# Patient Record
Sex: MALE | Race: WHITE | NOT HISPANIC OR LATINO | Employment: OTHER | URBAN - METROPOLITAN AREA
[De-identification: names, ages, dates, MRNs, and addresses within clinical notes are randomized per-mention and may not be internally consistent; named-entity substitution may affect disease eponyms.]

---

## 2017-01-13 ENCOUNTER — TELEPHONE (OUTPATIENT)
Dept: CARDIOLOGY | Facility: CLINIC | Age: 82
End: 2017-01-13

## 2017-01-13 DIAGNOSIS — I25.5 ISCHEMIC CARDIOMYOPATHY: ICD-10-CM

## 2017-01-13 DIAGNOSIS — Z79.01 CHRONIC ANTICOAGULATION: Primary | ICD-10-CM

## 2017-01-13 DIAGNOSIS — I25.10 CORONARY ARTERY DISEASE, ANGINA PRESENCE UNSPECIFIED, UNSPECIFIED VESSEL OR LESION TYPE, UNSPECIFIED WHETHER NATIVE OR TRANSPLANTED HEART: ICD-10-CM

## 2017-01-13 DIAGNOSIS — Z01.818 PRE-OP TESTING: ICD-10-CM

## 2017-01-13 NOTE — TELEPHONE ENCOUNTER
Pt scheduled for ICD 1/23/17@10am, labs scheduled 1/18/17, pre-op instructions given, [facility] pre-op nurse will also call pt, pt verbalizes understanding.

## 2017-01-16 ENCOUNTER — CLINICAL SUPPORT (OUTPATIENT)
Dept: FAMILY MEDICINE | Facility: CLINIC | Age: 82
End: 2017-01-16
Payer: MEDICARE

## 2017-01-16 ENCOUNTER — LAB VISIT (OUTPATIENT)
Dept: LAB | Facility: HOSPITAL | Age: 82
End: 2017-01-16
Attending: INTERNAL MEDICINE
Payer: MEDICARE

## 2017-01-16 DIAGNOSIS — Z79.01 CHRONIC ANTICOAGULATION: ICD-10-CM

## 2017-01-16 DIAGNOSIS — Z01.818 PRE-OP TESTING: ICD-10-CM

## 2017-01-16 LAB
ANION GAP SERPL CALC-SCNC: 6 MMOL/L
APTT BLDCRRT: 25.5 SEC
BASOPHILS # BLD AUTO: 0.02 K/UL
BASOPHILS NFR BLD: 0.3 %
BUN SERPL-MCNC: 18 MG/DL
CALCIUM SERPL-MCNC: 9.2 MG/DL
CHLORIDE SERPL-SCNC: 106 MMOL/L
CO2 SERPL-SCNC: 26 MMOL/L
CREAT SERPL-MCNC: 1.3 MG/DL
DIFFERENTIAL METHOD: ABNORMAL
EOSINOPHIL # BLD AUTO: 0.1 K/UL
EOSINOPHIL NFR BLD: 1.7 %
ERYTHROCYTE [DISTWIDTH] IN BLOOD BY AUTOMATED COUNT: 12.7 %
EST. GFR  (AFRICAN AMERICAN): 57.1 ML/MIN/1.73 M^2
EST. GFR  (NON AFRICAN AMERICAN): 49.4 ML/MIN/1.73 M^2
GLUCOSE SERPL-MCNC: 102 MG/DL
HCT VFR BLD AUTO: 34.6 %
HGB BLD-MCNC: 11.3 G/DL
INR PPP: 1
LYMPHOCYTES # BLD AUTO: 2.1 K/UL
LYMPHOCYTES NFR BLD: 26.8 %
MCH RBC QN AUTO: 29 PG
MCHC RBC AUTO-ENTMCNC: 32.7 %
MCV RBC AUTO: 89 FL
MONOCYTES # BLD AUTO: 0.7 K/UL
MONOCYTES NFR BLD: 8.8 %
NEUTROPHILS # BLD AUTO: 4.8 K/UL
NEUTROPHILS NFR BLD: 62.1 %
PLATELET # BLD AUTO: 244 K/UL
PMV BLD AUTO: 10.2 FL
POTASSIUM SERPL-SCNC: 4.6 MMOL/L
PROTHROMBIN TIME: 10.7 SEC
RBC # BLD AUTO: 3.89 M/UL
SODIUM SERPL-SCNC: 138 MMOL/L
WBC # BLD AUTO: 7.71 K/UL

## 2017-01-16 PROCEDURE — 36415 COLL VENOUS BLD VENIPUNCTURE: CPT | Mod: PO

## 2017-01-16 PROCEDURE — 96372 THER/PROPH/DIAG INJ SC/IM: CPT | Mod: S$GLB,,, | Performed by: FAMILY MEDICINE

## 2017-01-16 PROCEDURE — 85025 COMPLETE CBC W/AUTO DIFF WBC: CPT

## 2017-01-16 PROCEDURE — 85610 PROTHROMBIN TIME: CPT | Mod: PO

## 2017-01-16 PROCEDURE — 80048 BASIC METABOLIC PNL TOTAL CA: CPT

## 2017-01-16 PROCEDURE — 85730 THROMBOPLASTIN TIME PARTIAL: CPT | Mod: PO

## 2017-01-16 RX ADMIN — CYANOCOBALAMIN 1000 MCG: 1000 INJECTION, SOLUTION INTRAMUSCULAR; SUBCUTANEOUS at 10:01

## 2017-01-16 NOTE — MR AVS SNAPSHOT
[facility]  [address]  [place] [address]  Phone: [phone]  Fax: [phone]                  Dom Thomas   2017 10:00 AM   Appointment    Description:  Male : 1930   Provider:  [facility], NURSE   Department:  [facility]                To Do List           Future Appointments        Provider Department Dept Phone    2017 10:30 AM LAB, [place] [facility] [phone]    2017 9:45 AM [facility], NURSE [facility] [phone]    2017 8:00 AM LAB, [place] [facility] [phone]    2017 8:20 AM URINE [facility] [phone]    2017 2:00 PM Carlos Eduardo Tobar MD [facility] [phone]      Your Future Surgeries/Procedures     2017   Surgery with Ezequiel Rubio MD   [facility]    [address] [address]   [phone]              Goals (5 Years of Data)     None      [facility]     [facility] Nurse Care Line - 24/7 Assistance  Registered nurses in the [facility] provide clinical advisement, health education, appointment booking, and other advisory services.  Call for this free service at [phone].             Medications           Message regarding Medications     Verify the changes and/or additions to your medication regime listed below are the same as discussed with your clinician today.  If any of these changes or additions are incorrect, please notify your healthcare provider.             Verify that the below list of medications is an accurate representation of the medications you are currently taking.  If none reported, the list may be blank. If incorrect, please contact your healthcare provider. Carry this list with you in case of emergency.           Current Medications     atorvastatin (LIPITOR) 20 MG tablet Take 1 tablet (20 mg  total) by mouth once daily.    benazepril (LOTENSIN) 5 MG tablet Take 1 tablet (5 mg total) by mouth once daily.    clopidogrel (PLAVIX) 75 mg tablet Take 1 tablet (75 mg total) by mouth once daily.    lidocaine-prilocaine (EMLA) cream     meloxicam (MOBIC) 15 MG tablet Take 1 tablet (15 mg total) by mouth once daily.    metformin (GLUCOPHAGE-XR) 750 MG 24 hr tablet Take 1 tablet (750 mg total) by mouth 2 (two) times daily with meals.    temazepam (RESTORIL) 15 mg Cap TAKE 1 CAPSULE (15 MG) BY MOUTH AS NEEDED.           Clinical Reference Information           Allergies as of 1/16/2017     Carvedilol    Codeine      Immunizations Administered on Date of Encounter - 1/16/2017     None

## 2017-01-20 RX ORDER — TEMAZEPAM 15 MG/1
CAPSULE ORAL
Qty: 90 CAPSULE | Refills: 1 | Status: SHIPPED | OUTPATIENT
Start: 2017-01-20 | End: 2017-04-12 | Stop reason: SDUPTHER

## 2017-01-25 ENCOUNTER — TELEPHONE (OUTPATIENT)
Dept: CARDIOLOGY | Facility: CLINIC | Age: 82
End: 2017-01-25

## 2017-01-25 DIAGNOSIS — Z95.810 CARDIAC DEFIBRILLATOR IN SITU: Primary | ICD-10-CM

## 2017-01-25 DIAGNOSIS — I44.7 LBBB (LEFT BUNDLE BRANCH BLOCK): ICD-10-CM

## 2017-01-25 DIAGNOSIS — I25.5 ISCHEMIC CARDIOMYOPATHY: ICD-10-CM

## 2017-01-29 RX ORDER — CEPHALEXIN 500 MG/1
CAPSULE ORAL
Qty: 20 CAPSULE | Refills: 0 | Status: SHIPPED | OUTPATIENT
Start: 2017-01-29 | End: 2017-03-15

## 2017-01-31 ENCOUNTER — CLINICAL SUPPORT (OUTPATIENT)
Dept: CARDIOLOGY | Facility: CLINIC | Age: 82
End: 2017-01-31
Payer: MEDICARE

## 2017-01-31 DIAGNOSIS — I44.7 LBBB (LEFT BUNDLE BRANCH BLOCK): ICD-10-CM

## 2017-01-31 DIAGNOSIS — Z95.810 CARDIAC DEFIBRILLATOR IN SITU: ICD-10-CM

## 2017-01-31 DIAGNOSIS — I25.5 ISCHEMIC CARDIOMYOPATHY: ICD-10-CM

## 2017-01-31 PROCEDURE — 93284 PRGRMG EVAL IMPLANTABLE DFB: CPT | Mod: S$GLB,,, | Performed by: INTERNAL MEDICINE

## 2017-02-13 ENCOUNTER — CLINICAL SUPPORT (OUTPATIENT)
Dept: FAMILY MEDICINE | Facility: CLINIC | Age: 82
End: 2017-02-13
Payer: MEDICARE

## 2017-02-13 DIAGNOSIS — D51.9 ANEMIA DUE TO VITAMIN B12 DEFICIENCY, UNSPECIFIED B12 DEFICIENCY TYPE: Primary | ICD-10-CM

## 2017-02-13 PROCEDURE — 96372 THER/PROPH/DIAG INJ SC/IM: CPT | Mod: S$GLB,,, | Performed by: FAMILY MEDICINE

## 2017-02-13 RX ADMIN — CYANOCOBALAMIN 1000 MCG: 1000 INJECTION, SOLUTION INTRAMUSCULAR; SUBCUTANEOUS at 09:02

## 2017-03-01 ENCOUNTER — TELEPHONE (OUTPATIENT)
Dept: OPHTHALMOLOGY | Facility: CLINIC | Age: 82
End: 2017-03-01

## 2017-03-01 NOTE — TELEPHONE ENCOUNTER
----- Message from Asher Taylor sent at 3/1/2017  8:07 AM CST -----  Contact: Wife - Beulah Guzman  States that she had received an e-mail for a much earlier appointment than in April.  She is calling to see if you can schedule that earlier appointment. Please call [phone].  Thank you

## 2017-03-07 ENCOUNTER — OFFICE VISIT (OUTPATIENT)
Dept: OPHTHALMOLOGY | Facility: CLINIC | Age: 82
End: 2017-03-07
Payer: MEDICARE

## 2017-03-07 DIAGNOSIS — H02.102 ECTROPION OF BOTH LOWER EYELIDS, UNSPECIFIED ECTROPION TYPE: ICD-10-CM

## 2017-03-07 DIAGNOSIS — H02.105 ECTROPION OF BOTH LOWER EYELIDS, UNSPECIFIED ECTROPION TYPE: ICD-10-CM

## 2017-03-07 DIAGNOSIS — E11.9 DIABETES MELLITUS TYPE 2 WITHOUT RETINOPATHY: Primary | ICD-10-CM

## 2017-03-07 DIAGNOSIS — Z94.7 HISTORY OF CORNEAL TRANSPLANT: ICD-10-CM

## 2017-03-07 DIAGNOSIS — H35.30 MACULAR DEGENERATION: ICD-10-CM

## 2017-03-07 PROCEDURE — 99499 UNLISTED E&M SERVICE: CPT | Mod: S$GLB,,, | Performed by: OPHTHALMOLOGY

## 2017-03-07 PROCEDURE — 92014 COMPRE OPH EXAM EST PT 1/>: CPT | Mod: S$GLB,,, | Performed by: OPHTHALMOLOGY

## 2017-03-07 PROCEDURE — 99999 PR PBB SHADOW E&M-EST. PATIENT-LVL II: CPT | Mod: PBBFAC,,, | Performed by: OPHTHALMOLOGY

## 2017-03-07 NOTE — MR AVS SNAPSHOT
[place] [facility]  [address]  [place] [address]  Phone: [phone]  Fax: [phone]                  Dom Guzman   3/7/2017 1:00 PM   Office Visit    Description:  Male : 1930   Provider:  Maribell Rothman MD   Department:  [place] [facility]           Reason for Visit     Concerns About Ocular Health           Diagnoses this Visit        Comments    Diabetes mellitus type 2 without retinopathy    -  Primary     Macular degeneration         History of corneal transplant         Ectropion of both lower eyelids, unspecified ectropion type                To Do List           Future Appointments        Provider Department Dept Phone    3/15/2017 9:00 AM [facility], NURSE [facility] [phone]    3/15/2017 9:45 AM Ezequiel Rubio MD [facility] [phone]    3/15/2017 10:00 AM PACEMAKER [facility] [phone]    2017 8:00 AM LAB, [place] [facility] [phone]    2017 8:20 AM URINE [facility] [phone]      Goals (5 Years of Data)     None      Follow-Up and Disposition     Return in about 1 year (around 3/7/2018) for diabetic eye exam - optometry ok.      [facility]     [facility] Care [facility] - 7 Assistance  Registered nurses in the [facility] provide clinical advisement, health education, appointment booking, and other advisory services.  Call for this free service at [phone].             Medications           Message regarding Medications     Verify the changes and/or additions to your medication regime listed below are the same as discussed with your clinician today.  If any of these changes or additions are incorrect, please notify your healthcare provider.             Verify that the below list of medications is an accurate representation of the medications you are currently taking.  If none reported, the list may be  "blank. If incorrect, please contact your healthcare provider. Carry this list with you in case of emergency.           Current Medications     atorvastatin (LIPITOR) 20 MG tablet Take 1 tablet (20 mg total) by mouth once daily.    benazepril (LOTENSIN) 5 MG tablet Take 1 tablet (5 mg total) by mouth once daily.    cephALEXin (KEFLEX) 500 MG capsule TAKE 1 CAPSULE EVERY 8 HOURS    clopidogrel (PLAVIX) 75 mg tablet Take 1 tablet (75 mg total) by mouth once daily.    lidocaine-prilocaine (EMLA) cream     meloxicam (MOBIC) 15 MG tablet Take 1 tablet (15 mg total) by mouth once daily.    metformin (GLUCOPHAGE-XR) 750 MG 24 hr tablet Take 1 tablet (750 mg total) by mouth 2 (two) times daily with meals.    temazepam (RESTORIL) 15 mg Cap TAKE 1 CAPSULE BY MOUTH DAILY AS NEEDED.           Clinical Reference Information           Allergies as of 3/7/2017     Carvedilol    Codeine      Immunizations Administered on Date of Encounter - 3/7/2017     None      Instructions      I recommend that you use artificial tears 2-3 times daily. Recommended brands include Systane, Refresh, Genteal, and Thera-tears. Generic drops are okay too. Avoid any drops that say they "get the red out" - these should not be used on a regular basis.         Language Assistance Services     ATTENTION: Language assistance services are available, free of charge. Please call [phone].      ATENCIÓN: Si ricardo hernandes, tiene a wilburn disposición servicios gratuitos de asistencia lingüística. Llame al [phone].     [facility] Ý: N?u b?n nói Ti?ng Vi?t, có các d?ch v? h? tr? ngôn ng? mi?n phí dành cho b?n. G?i s? [phone].         [facility] complies with applicable Federal civil rights laws and does not discriminate on the basis of race, color, national origin, age, disability, or sex.        "

## 2017-03-07 NOTE — PROGRESS NOTES
HPI     Here for annual exam, states the LLL gets red and runs molina ongoing   comes and goes, seems ok today, did use Visine when the eye was bothering   states it helped. Does not use any gtts regularly.     S/p PKP Left Eye  CE Dr Gomez    Agree with above. Denies pain. Occasional floaters - long-standing.     Hemoglobin A1C       Date                     Value               Ref Range             Status                11/11/2016               7.0 (H)             4.5 - 6.2 %           Final              Comment:    According to ADA guidelines, hemoglobin A1C <7.0% represents  optimal   control in non-pregnant diabetic patients.  Different  metrics may apply   to specific populations.   Standards of Medical Care in Diabetes -   2016.  For the purpose of screening for the presence of diabetes:  <5.7%       Consistent with the absence of diabetes  5.7-6.4%  Consistent with   increasing risk for diabetes   (prediabetes)  >or=6.5%  Consistent with   diabetes  Currently no consensus exists for use of hemoglobin A1C  for   diagnosis of diabetes for children.         07/28/2016               7.1 (H)             0.0 - 5.6 %           Final              Comment:    Reference Interval:  5.0 - 5.6 Normal   5.7 - 6.4 High Risk   > 6.5   Diabetic    Hgb A1c results are standardized based on the (NGSP) National     Glycohemoglobin Standardization Program.    Hemoglobin A1C levels are   related to mean serum/plasma glucose   during the preceding 2-3 months.              05/11/2016               7.4 (H)             4.5 - 6.2 %           Final            ----------         Last edited by Maribell Rothman MD on 3/7/2017  1:54 PM.     ROS     Positive for: Neurological (neuropathy), Endocrine (DM diagnosed 2012),   Cardiovascular (denies HTN; CAD with stent and defibrillator), Eyes (CE   OU; PKP OS, trauma OS)    Negative for: Genitourinary (denies nephropathy), Respiratory (denies   asthma)    Last edited by Maribell Manuel  "Torres Rothman MD on 3/7/2017  1:54 PM.   (History)        Assessment /Plan     For exam results, see Encounter Report.    Diabetes mellitus type 2 without retinopathy    Macular degeneration    History of corneal transplant    Ectropion of both lower eyelids, unspecified ectropion type        Ectropion lower lids OU - I recommend that you use artificial tears 2-3 times daily. Recommended brands include Systane, Refresh, Genteal, and Thera-tears. Generic drops are okay too. Avoid any drops that say they "get the red out" - these should not be used on a regular basis. Also mateus qhs. Few trichiasis lashes RUL, minimal discomfort - observe for now. Warm compresses, lid hygiene. Handout given.    Diabetes mellitus type 2 without retinopathy - monitor annually, along with ARMD    Macular degeneration - more mottling/atrophy than prior photos from 2008. Last OCT ok. 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

## 2017-03-15 ENCOUNTER — CLINICAL SUPPORT (OUTPATIENT)
Dept: FAMILY MEDICINE | Facility: CLINIC | Age: 82
End: 2017-03-15
Payer: MEDICARE

## 2017-03-15 ENCOUNTER — OFFICE VISIT (OUTPATIENT)
Dept: CARDIOLOGY | Facility: CLINIC | Age: 82
End: 2017-03-15
Payer: MEDICARE

## 2017-03-15 ENCOUNTER — CLINICAL SUPPORT (OUTPATIENT)
Dept: CARDIOLOGY | Facility: CLINIC | Age: 82
End: 2017-03-15
Payer: MEDICARE

## 2017-03-15 VITALS
HEIGHT: 70 IN | BODY MASS INDEX: 23.62 KG/M2 | SYSTOLIC BLOOD PRESSURE: 129 MMHG | DIASTOLIC BLOOD PRESSURE: 65 MMHG | WEIGHT: 165 LBS | HEART RATE: 69 BPM

## 2017-03-15 DIAGNOSIS — I25.5 ISCHEMIC CARDIOMYOPATHY: ICD-10-CM

## 2017-03-15 DIAGNOSIS — Z95.810 CARDIAC DEFIBRILLATOR IN SITU: ICD-10-CM

## 2017-03-15 DIAGNOSIS — I25.10 CORONARY ARTERY DISEASE INVOLVING NATIVE CORONARY ARTERY WITHOUT ANGINA PECTORIS, UNSPECIFIED WHETHER NATIVE OR TRANSPLANTED HEART: Primary | ICD-10-CM

## 2017-03-15 DIAGNOSIS — I44.7 LBBB (LEFT BUNDLE BRANCH BLOCK): ICD-10-CM

## 2017-03-15 DIAGNOSIS — E78.5 HYPERLIPIDEMIA, UNSPECIFIED HYPERLIPIDEMIA TYPE: ICD-10-CM

## 2017-03-15 PROCEDURE — 1157F ADVNC CARE PLAN IN RCRD: CPT | Mod: S$GLB,,, | Performed by: INTERNAL MEDICINE

## 2017-03-15 PROCEDURE — 99999 PR PBB SHADOW E&M-EST. PATIENT-LVL II: CPT | Mod: PBBFAC,,, | Performed by: INTERNAL MEDICINE

## 2017-03-15 PROCEDURE — 1126F AMNT PAIN NOTED NONE PRSNT: CPT | Mod: S$GLB,,, | Performed by: INTERNAL MEDICINE

## 2017-03-15 PROCEDURE — 99499 UNLISTED E&M SERVICE: CPT | Mod: S$GLB,,, | Performed by: INTERNAL MEDICINE

## 2017-03-15 PROCEDURE — 1159F MED LIST DOCD IN RCRD: CPT | Mod: S$GLB,,, | Performed by: INTERNAL MEDICINE

## 2017-03-15 PROCEDURE — 99024 POSTOP FOLLOW-UP VISIT: CPT | Mod: S$GLB,,, | Performed by: INTERNAL MEDICINE

## 2017-03-15 PROCEDURE — 93284 PRGRMG EVAL IMPLANTABLE DFB: CPT | Mod: S$GLB,,, | Performed by: INTERNAL MEDICINE

## 2017-03-15 PROCEDURE — 1160F RVW MEDS BY RX/DR IN RCRD: CPT | Mod: S$GLB,,, | Performed by: INTERNAL MEDICINE

## 2017-03-15 PROCEDURE — 96372 THER/PROPH/DIAG INJ SC/IM: CPT | Mod: S$GLB,,, | Performed by: FAMILY MEDICINE

## 2017-03-15 RX ADMIN — CYANOCOBALAMIN 1000 MCG: 1000 INJECTION, SOLUTION INTRAMUSCULAR; SUBCUTANEOUS at 09:03

## 2017-03-15 NOTE — PROGRESS NOTES
Subjective:    Patient ID:  Dom Guzman is a 86 y.o. male who presents for follow-up of Coronary Artery Disease      HPI  He comes for follow up with no major problems, no chest pain, no shortness of breath.  FC II    Review of Systems   Constitution: Negative for decreased appetite, weakness, malaise/fatigue, weight gain and weight loss.   Cardiovascular: Negative for chest pain, dyspnea on exertion, leg swelling, palpitations and syncope.   Respiratory: Negative for cough and shortness of breath.    Gastrointestinal: Negative.    All other systems reviewed and are negative.       Objective:    Physical Exam   Constitutional: He is oriented to person, place, and time. He appears well-developed and well-nourished.   HENT:   Head: Normocephalic.   Eyes: Pupils are equal, round, and reactive to light.   Neck: Normal range of motion. Neck supple. No JVD present. Carotid bruit is not present. No thyromegaly present.   Cardiovascular: Normal rate, regular rhythm, normal heart sounds, intact distal pulses and normal pulses.  PMI is not displaced.  Exam reveals no gallop.    No murmur heard.  Pulmonary/Chest: Effort normal and breath sounds normal.   Abdominal: Soft. Normal appearance. He exhibits no mass. There is no hepatosplenomegaly. There is no tenderness.   Musculoskeletal: Normal range of motion. He exhibits no edema.   Neurological: He is alert and oriented to person, place, and time. He has normal strength and normal reflexes. No sensory deficit.   Skin: Skin is warm and intact.   Psychiatric: He has a normal mood and affect.   Nursing note and vitals reviewed.        Assessment:       1. Coronary artery disease involving native coronary artery without angina pectoris, unspecified whether native or transplanted heart    2. Ischemic cardiomyopathy    3. Hyperlipidemia, unspecified hyperlipidemia type         Plan:     Continue all cardiac medications  Regular exercise program  6 m f/u with [facility], labs

## 2017-03-16 ENCOUNTER — TELEPHONE (OUTPATIENT)
Dept: CARDIOLOGY | Facility: CLINIC | Age: 82
End: 2017-03-16

## 2017-03-16 NOTE — TELEPHONE ENCOUNTER
Patient is having double knee replacement with Dr. Wily Mueller at [facility]  and needs cardiac clearance. Please advise. Fax to [phone].

## 2017-03-29 RX ORDER — DEXTROSE 4 G
TABLET,CHEWABLE ORAL
Qty: 1 EACH | Refills: 0 | Status: ON HOLD | OUTPATIENT
Start: 2017-03-29 | End: 2021-01-01 | Stop reason: HOSPADM

## 2017-03-29 RX ORDER — ISOPROPYL ALCOHOL 70 ML/100ML
1 SWAB TOPICAL
Qty: 100 EACH | Refills: 3 | Status: ON HOLD | COMMUNITY
Start: 2017-03-29 | End: 2021-01-01 | Stop reason: HOSPADM

## 2017-03-29 RX ORDER — INSULIN PUMP SYRINGE, 3 ML
EACH MISCELLANEOUS
Qty: 1 EACH | Refills: 3 | Status: ON HOLD | OUTPATIENT
Start: 2017-03-29 | End: 2021-01-01 | Stop reason: HOSPADM

## 2017-04-12 ENCOUNTER — CLINICAL SUPPORT (OUTPATIENT)
Dept: FAMILY MEDICINE | Facility: CLINIC | Age: 82
End: 2017-04-12
Payer: MEDICARE

## 2017-04-12 ENCOUNTER — TELEPHONE (OUTPATIENT)
Dept: FAMILY MEDICINE | Facility: CLINIC | Age: 82
End: 2017-04-12

## 2017-04-12 DIAGNOSIS — E53.8 B12 DEFICIENCY: Primary | ICD-10-CM

## 2017-04-12 PROCEDURE — 96372 THER/PROPH/DIAG INJ SC/IM: CPT | Mod: S$GLB,,, | Performed by: FAMILY MEDICINE

## 2017-04-12 RX ORDER — TEMAZEPAM 30 MG/1
30 CAPSULE ORAL NIGHTLY PRN
Qty: 90 CAPSULE | Refills: 1 | Status: SHIPPED | OUTPATIENT
Start: 2017-04-12 | End: 2017-10-09 | Stop reason: SDUPTHER

## 2017-04-12 RX ADMIN — CYANOCOBALAMIN 1000 MCG: 1000 INJECTION, SOLUTION INTRAMUSCULAR; SUBCUTANEOUS at 09:04

## 2017-04-12 NOTE — TELEPHONE ENCOUNTER
Patient is asking to increase dosage of temazepam. States he can only sleep about 3 hours per night and is up. Please advise.

## 2017-05-08 ENCOUNTER — PATIENT OUTREACH (OUTPATIENT)
Dept: ADMINISTRATIVE | Facility: HOSPITAL | Age: 82
End: 2017-05-08

## 2017-05-19 ENCOUNTER — LAB VISIT (OUTPATIENT)
Dept: LAB | Facility: HOSPITAL | Age: 82
End: 2017-05-19
Attending: FAMILY MEDICINE
Payer: MEDICARE

## 2017-05-19 DIAGNOSIS — E53.8 B12 DEFICIENCY: ICD-10-CM

## 2017-05-19 DIAGNOSIS — E11.9 TYPE 2 DIABETES MELLITUS WITHOUT COMPLICATION, UNSPECIFIED LONG TERM INSULIN USE STATUS: ICD-10-CM

## 2017-05-19 LAB
ALBUMIN SERPL BCP-MCNC: 3.4 G/DL
ALP SERPL-CCNC: 67 U/L
ALT SERPL W/O P-5'-P-CCNC: 10 U/L
ANION GAP SERPL CALC-SCNC: 9 MMOL/L
AST SERPL-CCNC: 12 U/L
BILIRUB SERPL-MCNC: 0.6 MG/DL
BUN SERPL-MCNC: 17 MG/DL
CALCIUM SERPL-MCNC: 9 MG/DL
CHLORIDE SERPL-SCNC: 109 MMOL/L
CHOLEST/HDLC SERPL: 3.6 {RATIO}
CO2 SERPL-SCNC: 25 MMOL/L
CREAT SERPL-MCNC: 1.4 MG/DL
EST. GFR  (AFRICAN AMERICAN): 52.2 ML/MIN/1.73 M^2
EST. GFR  (NON AFRICAN AMERICAN): 45.2 ML/MIN/1.73 M^2
GLUCOSE SERPL-MCNC: 140 MG/DL
HDL/CHOLESTEROL RATIO: 28.1 %
HDLC SERPL-MCNC: 121 MG/DL
HDLC SERPL-MCNC: 34 MG/DL
LDLC SERPL CALC-MCNC: 65 MG/DL
NONHDLC SERPL-MCNC: 87 MG/DL
POTASSIUM SERPL-SCNC: 4.8 MMOL/L
PROT SERPL-MCNC: 6.5 G/DL
SODIUM SERPL-SCNC: 143 MMOL/L
TRIGL SERPL-MCNC: 110 MG/DL
VIT B12 SERPL-MCNC: 353 PG/ML

## 2017-05-19 PROCEDURE — 36415 COLL VENOUS BLD VENIPUNCTURE: CPT | Mod: PO

## 2017-05-19 PROCEDURE — 82607 VITAMIN B-12: CPT

## 2017-05-19 PROCEDURE — 80053 COMPREHEN METABOLIC PANEL: CPT

## 2017-05-19 PROCEDURE — 83036 HEMOGLOBIN GLYCOSYLATED A1C: CPT

## 2017-05-19 PROCEDURE — 80061 LIPID PANEL: CPT

## 2017-05-20 LAB
ESTIMATED AVG GLUCOSE: 163 MG/DL
HBA1C MFR BLD HPLC: 7.3 %

## 2017-05-22 ENCOUNTER — OFFICE VISIT (OUTPATIENT)
Dept: FAMILY MEDICINE | Facility: CLINIC | Age: 82
End: 2017-05-22
Payer: MEDICARE

## 2017-05-22 VITALS
DIASTOLIC BLOOD PRESSURE: 56 MMHG | TEMPERATURE: 98 F | HEIGHT: 70 IN | WEIGHT: 165.81 LBS | SYSTOLIC BLOOD PRESSURE: 146 MMHG | BODY MASS INDEX: 23.74 KG/M2 | HEART RATE: 78 BPM

## 2017-05-22 DIAGNOSIS — D51.9 ANEMIA DUE TO VITAMIN B12 DEFICIENCY, UNSPECIFIED B12 DEFICIENCY TYPE: ICD-10-CM

## 2017-05-22 DIAGNOSIS — G47.00 INSOMNIA, UNSPECIFIED TYPE: ICD-10-CM

## 2017-05-22 DIAGNOSIS — E13.40 NEUROPATHY DUE TO SECONDARY DIABETES: ICD-10-CM

## 2017-05-22 DIAGNOSIS — E11.9 TYPE 2 DIABETES MELLITUS WITHOUT COMPLICATION, UNSPECIFIED LONG TERM INSULIN USE STATUS: Primary | ICD-10-CM

## 2017-05-22 DIAGNOSIS — Z00.00 PREVENTATIVE HEALTH CARE: ICD-10-CM

## 2017-05-22 DIAGNOSIS — I25.10 CORONARY ARTERY DISEASE INVOLVING NATIVE CORONARY ARTERY WITHOUT ANGINA PECTORIS, UNSPECIFIED WHETHER NATIVE OR TRANSPLANTED HEART: ICD-10-CM

## 2017-05-22 DIAGNOSIS — E78.5 HYPERLIPIDEMIA, UNSPECIFIED HYPERLIPIDEMIA TYPE: ICD-10-CM

## 2017-05-22 PROCEDURE — 1126F AMNT PAIN NOTED NONE PRSNT: CPT | Mod: S$GLB,,, | Performed by: FAMILY MEDICINE

## 2017-05-22 PROCEDURE — 1160F RVW MEDS BY RX/DR IN RCRD: CPT | Mod: S$GLB,,, | Performed by: FAMILY MEDICINE

## 2017-05-22 PROCEDURE — 1159F MED LIST DOCD IN RCRD: CPT | Mod: S$GLB,,, | Performed by: FAMILY MEDICINE

## 2017-05-22 PROCEDURE — 99499 UNLISTED E&M SERVICE: CPT | Mod: S$GLB,,, | Performed by: FAMILY MEDICINE

## 2017-05-22 PROCEDURE — 96372 THER/PROPH/DIAG INJ SC/IM: CPT | Mod: S$GLB,,, | Performed by: FAMILY MEDICINE

## 2017-05-22 PROCEDURE — 99214 OFFICE O/P EST MOD 30 MIN: CPT | Mod: 25,S$GLB,, | Performed by: FAMILY MEDICINE

## 2017-05-22 PROCEDURE — 99999 PR PBB SHADOW E&M-EST. PATIENT-LVL III: CPT | Mod: PBBFAC,,, | Performed by: FAMILY MEDICINE

## 2017-05-22 RX ORDER — CYANOCOBALAMIN 1000 UG/ML
1000 INJECTION, SOLUTION INTRAMUSCULAR; SUBCUTANEOUS
Status: SHIPPED | OUTPATIENT
Start: 2017-05-22 | End: 2018-05-17

## 2017-05-22 RX ADMIN — CYANOCOBALAMIN 1000 MCG: 1000 INJECTION, SOLUTION INTRAMUSCULAR; SUBCUTANEOUS at 02:05

## 2017-05-22 NOTE — PROGRESS NOTES
Chief Complaint   Patient presents with    Diabetes     6 month follow up, Due B 12 inj     HPI:   Diabetes - Takes metformin  BID; Bgs 140 in AM. No hypoglycemia. tolerating Lotensin 5mg  Neuropathy has been persistent.  He is using a topical analgesic 4 times a day which is helping.    HLD - tolerating Lipitor  Insomnia - well- controlled on Restoril 30mg nightly.  CAD s/p NSTEMI - underwent successful PCI of LAD; ICD; taking plavix daily    PAST MEDICAL HISTORY:   B12 deficiency   Osteoarthritis  macular degeneration   dyslipidemia   Diabetes with neuropathy  CAD    PAST SURGICAL HISTORY:   Right hip replacement   eye surgery   blood clot in his head, status post ceslo hole  skin surgeries.     SOCIAL HISTORY: No tobacco. No alcohol. Lives with family.     FAMILY HISTORY: No prostate or colon cancer.     REVIEW OF SYSTEMS:     GENERAL: No fever, chills, fatigability or weight changes.  SKIN/BREASTS: No rashes, sores, itching or changes in color or texture of skin. No changes in moles.   HEAD: No headaches or recent head trauma.   EYES: Visual acuity fine. Denies blurriness, tearing, itching, photophobia, diplopia, or visual changes.   EARS: Denies ear pain, discharge, tinnitus or vertigo. Denies hearing loss.   NOSE: No loss of smell, epistaxis, postnasal drip, discharge, obstruction, or sneezing.  MOUTH & THROAT: No hoarseness, change in voice, swallowing difficulty. No excessive gum bleeding.   HEMATOLOGICAL/NODES: Denies swollen glands. No bleeding or bruising.  CHEST: No ROBERTS, cyanosis, wheezing, cough or sputum production.  CARDIOVASCULAR: Denies chest pain, dyspnea, orthopnea, or palpitations.  GI/ABDOMEN: Appetite fine. No weight loss. Denies nausea, vomiting, diarrhea, constipation, abdominal pain, hematemesis or blood in stool.  URINARY: No dysuria,hematuria, nocturia, incontinence, flank pain, urgency, or urinary difficulty.  PERIPHERAL VASCULAR: No claudication, cold intolerance or cyanosis.   "Reports numbness to feet and pain, which has been chronic. No worsening, no major improvement since starting Neurontin.  NEUROLOGIC: No history of seizures, paralysis, alteration of gait or coordination.    PSYCH:  No crying spells. Denies anxiety symptoms.    PHYSICAL EXAM:     BP (!) 146/56 (BP Location: Left arm, Patient Position: Sitting, BP Method: Manual)   Pulse 78   Temp 97.8 °F (36.6 °C) (Oral)   Ht 5' 10" (1.778 m)   Wt 75.2 kg (165 lb 12.6 oz)   BMI 23.79 kg/m²     GENERAL: He is a well-developed, well-nourished male, who appears in no apparent distress.   HEENT: NC/AT. PERRLA. EOMI. Mucous membranes are moist.   NECK: Supple. No carotid bruits. No thyroid enlargement.   CHEST: Clear to auscultation bilaterally with no crackles, rhonchi or wheezes.   CARDIOVASCULAR: Reveals a regular rate and rhythm. Normal S1 and S2. No murmurs, rubs or gallops are noted.   ABDOMEN: Positive bowel sounds, soft, and nontender. No HSM. No masses.   EXTREMITIES: No edema noted and pulses 2+ bilaterally.   Foot exam: inspection normal, sensation intact; 2+ DP pulses bilaterally    Results for orders placed or performed in visit on 05/19/17   Microalbumin/creatinine urine ratio   Result Value Ref Range    Microalbum.,U,Random 19.0 ug/mL    Creatinine, Random Ur 121.0 23.0 - 375.0 mg/dL    Microalb Creat Ratio 15.7 0.0 - 30.0 ug/mg     Labs 5/19/2017 reviewed    A/P:  Type 2 diabetes mellitus without complication, unspecified long term insulin use status  -     Hemoglobin A1c; Future; Expected date: 11/18/2017  -     Lipid panel; Future; Expected date: 11/18/2017  -     Comprehensive metabolic panel; Future; Expected date: 11/18/2017    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    Preventative health care  -     Discontinue: pneumoc 13-chandrika conj-dip cr,PF, (PREVNAR 13, PF,) 0.5 mL Syrg; Inject 0.5 mLs into the muscle once.  Dispense: 1 Syringe; Refill: 0    Other orders  -     Cancel: Pneumococcal Conjugate Vaccine (13 Valent) (IM); Future; Expected date: 06/22/2017      Diabetes mellitus type ii - controlled  Continue metformin XR 750mg BID    Hld (hyperlipidemia) - controlled  lipitor 20mg    Insomnia  Continue Restoril 30mg daily    f/u 6 months with labs  B12 shots monthly    Continue healthy eating and exercise

## 2017-05-23 ENCOUNTER — OFFICE VISIT (OUTPATIENT)
Dept: DERMATOLOGY | Facility: CLINIC | Age: 82
End: 2017-05-23
Payer: MEDICARE

## 2017-05-23 VITALS — HEIGHT: 70 IN | WEIGHT: 165 LBS | BODY MASS INDEX: 23.62 KG/M2

## 2017-05-23 DIAGNOSIS — L57.0 ACTINIC KERATOSES: Primary | ICD-10-CM

## 2017-05-23 DIAGNOSIS — D69.2 SENILE PURPURA: ICD-10-CM

## 2017-05-23 DIAGNOSIS — L81.4 SOLAR LENTIGO: ICD-10-CM

## 2017-05-23 DIAGNOSIS — L82.1 SEBORRHEIC KERATOSES: ICD-10-CM

## 2017-05-23 DIAGNOSIS — Z85.828 HISTORY OF SKIN CANCER: ICD-10-CM

## 2017-05-23 PROCEDURE — 1159F MED LIST DOCD IN RCRD: CPT | Mod: S$GLB,,, | Performed by: DERMATOLOGY

## 2017-05-23 PROCEDURE — 99213 OFFICE O/P EST LOW 20 MIN: CPT | Mod: 25,S$GLB,, | Performed by: DERMATOLOGY

## 2017-05-23 PROCEDURE — 99999 PR PBB SHADOW E&M-EST. PATIENT-LVL III: CPT | Mod: PBBFAC,,, | Performed by: DERMATOLOGY

## 2017-05-23 PROCEDURE — 17004 DESTROY PREMAL LESIONS 15/>: CPT | Mod: S$GLB,,, | Performed by: DERMATOLOGY

## 2017-05-23 PROCEDURE — 1126F AMNT PAIN NOTED NONE PRSNT: CPT | Mod: S$GLB,,, | Performed by: DERMATOLOGY

## 2017-05-23 PROCEDURE — 99499 UNLISTED E&M SERVICE: CPT | Mod: S$GLB,,, | Performed by: DERMATOLOGY

## 2017-05-23 PROCEDURE — 1160F RVW MEDS BY RX/DR IN RCRD: CPT | Mod: S$GLB,,, | Performed by: DERMATOLOGY

## 2017-05-23 NOTE — PATIENT INSTRUCTIONS

## 2017-05-23 NOTE — PROGRESS NOTES
Subjective:       Patient ID:  Dom Guzman is a 86 y.o. male who presents for   Chief Complaint   Patient presents with    Spot     face    Skin Check     UBSE     Patient last seen 6/28/2016 with AK's treated with cryo  H/o NMSCs:  BCC  07/07/2014 right infra-auricular  SCC nose s/p Mohs  This is a high risk patient here to check for the development of new lesions.          Spot  - Initial  Affected locations: face  Duration: 2 years  Signs / symptoms: scaling and dryness  Severity: mild  Timing: constant  Aggravated by: nothing  Treatments tried: Merl Pedro sunscreen   Improvement on treatment: no relief        Review of Systems   Constitutional: Negative for fever, chills, weight loss, weight gain, fatigue, night sweats and malaise.   Skin: Positive for daily sunscreen use, activity-related sunscreen use and wears hat.   Hematologic/Lymphatic: Does not bruise/bleed easily.        Objective:    Physical Exam   Constitutional: He appears well-developed and well-nourished. No distress.   Neurological: He is alert and oriented to person, place, and time. He is not disoriented.   Psychiatric: He has a normal mood and affect.   Skin:   Areas Examined (abnormalities noted in diagram):   Scalp / Hair Palpated and Inspected  Head / Face Inspection Performed  Neck Inspection Performed  Chest / Axilla Inspection Performed  Abdomen Inspection Performed  Back Inspection Performed  RUE Inspected  LUE Inspection Performed                       Diagram Legend     Erythematous scaling macule/papule c/w actinic keratosis       Vascular papule c/w angioma      Pigmented verrucoid papule/plaque c/w seborrheic keratosis      Yellow umbilicated papule c/w sebaceous hyperplasia      Irregularly shaped tan macule c/w lentigo     1-2 mm smooth white papules consistent with Milia      Movable subcutaneous cyst with punctum c/w epidermal inclusion cyst      Subcutaneous movable cyst c/w pilar cyst      Firm pink to brown papule c/w  dermatofibroma      Pedunculated fleshy papule(s) c/w skin tag(s)      Evenly pigmented macule c/w junctional nevus     Mildly variegated pigmented, slightly irregular-bordered macule c/w mildly atypical nevus      Flesh colored to evenly pigmented papule c/w intradermal nevus       Pink pearly papule/plaque c/w basal cell carcinoma      Erythematous hyperkeratotic cursted plaque c/w SCC      Surgical scar with no sign of skin cancer recurrence      Open and closed comedones      Inflammatory papules and pustules      Verrucoid papule consistent consistent with wart     Erythematous eczematous patches and plaques     Dystrophic onycholytic nail with subungual debris c/w onychomycosis     Umbilicated papule    Erythematous-base heme-crusted tan verrucoid plaque consistent with inflamed seborrheic keratosis     Erythematous Silvery Scaling Plaque c/w Psoriasis     See annotation      Assessment / Plan:        Actinic keratoses  Cryosurgery Procedure Note    Verbal consent from the patient is obtained and the patient is aware of the precancerous quality and need for treatment of these lesions. Liquid nitrogen cryosurgery is applied to the 25 actinic keratoses, as detailed in the physical exam, to produce a freeze injury. The patient is aware that blisters may form and is instructed on wound care with gentle cleansing and use of vaseline ointment to keep moist until healed. The patient is supplied a handout on cryosurgery and is instructed to call if lesions do not completely resolve.    History of skin cancer  Area of previous NMSC (R infra auricular and nose) examined. Site well healed with no signs of recurrence.  Upper body skin examination performed today including at least 9 points as noted in physical examination. No lesions suspicious for malignancy noted.    Seborrheic keratoses  These are benign inherited growths without a malignant potential. Reassurance given to patient. No treatment is necessary.     Solar  lentigo  This is a benign hyperpigmented sun induced lesion. Daily sun protection will reduce the number of new lesions. Treatment of these benign lesions are considered cosmetic.    Senile purpura  Common finding in aging sun damaged skin; plavix contributes    Patient instructed in importance in daily sun protection of at least spf 30. Sun avoidance and topical protection discussed.   Recommend Elta MD (physician office) COTZ sensitive (available online) for daily use on face and neck.  Patient encouraged to wear hat for all outdoor exposure.   Also discussed sun protective clothing.           Return in about 6 months (around 11/23/2017).

## 2017-06-02 DIAGNOSIS — E13.40 NEUROPATHY DUE TO SECONDARY DIABETES: ICD-10-CM

## 2017-06-02 RX ORDER — CELECOXIB 200 MG/1
200 CAPSULE ORAL DAILY
Qty: 90 CAPSULE | Refills: 3 | Status: SHIPPED | OUTPATIENT
Start: 2017-06-02 | End: 2017-11-29

## 2017-06-19 ENCOUNTER — CLINICAL SUPPORT (OUTPATIENT)
Dept: FAMILY MEDICINE | Facility: CLINIC | Age: 82
End: 2017-06-19
Payer: MEDICARE

## 2017-06-19 ENCOUNTER — TELEPHONE (OUTPATIENT)
Dept: FAMILY MEDICINE | Facility: CLINIC | Age: 82
End: 2017-06-19

## 2017-06-19 ENCOUNTER — CLINICAL SUPPORT (OUTPATIENT)
Dept: CARDIOLOGY | Facility: CLINIC | Age: 82
End: 2017-06-19
Payer: MEDICARE

## 2017-06-19 DIAGNOSIS — I25.5 ISCHEMIC CARDIOMYOPATHY: ICD-10-CM

## 2017-06-19 DIAGNOSIS — I44.7 LBBB (LEFT BUNDLE BRANCH BLOCK): ICD-10-CM

## 2017-06-19 DIAGNOSIS — Z95.810 CARDIAC DEFIBRILLATOR IN SITU: ICD-10-CM

## 2017-06-19 PROCEDURE — G0009 ADMIN PNEUMOCOCCAL VACCINE: HCPCS | Mod: S$GLB,,, | Performed by: FAMILY MEDICINE

## 2017-06-19 PROCEDURE — 99999 PR PBB SHADOW E&M-EST. PATIENT-LVL I: CPT | Mod: PBBFAC,,,

## 2017-06-19 PROCEDURE — 90670 PCV13 VACCINE IM: CPT | Mod: S$GLB,,, | Performed by: FAMILY MEDICINE

## 2017-06-19 PROCEDURE — 93295 DEV INTERROG REMOTE 1/2/MLT: CPT | Mod: S$GLB,,, | Performed by: INTERNAL MEDICINE

## 2017-06-19 PROCEDURE — 93296 REM INTERROG EVL PM/IDS: CPT | Mod: S$GLB,,, | Performed by: INTERNAL MEDICINE

## 2017-06-19 PROCEDURE — 96372 THER/PROPH/DIAG INJ SC/IM: CPT | Mod: 59,S$GLB,, | Performed by: FAMILY MEDICINE

## 2017-06-19 RX ADMIN — CYANOCOBALAMIN 1000 MCG: 1000 INJECTION, SOLUTION INTRAMUSCULAR; SUBCUTANEOUS at 10:06

## 2017-07-19 ENCOUNTER — CLINICAL SUPPORT (OUTPATIENT)
Dept: FAMILY MEDICINE | Facility: CLINIC | Age: 82
End: 2017-07-19
Payer: MEDICARE

## 2017-07-19 DIAGNOSIS — D51.9 ANEMIA DUE TO VITAMIN B12 DEFICIENCY, UNSPECIFIED B12 DEFICIENCY TYPE: Primary | ICD-10-CM

## 2017-07-19 PROCEDURE — 96372 THER/PROPH/DIAG INJ SC/IM: CPT | Mod: S$GLB,,, | Performed by: FAMILY MEDICINE

## 2017-07-19 RX ADMIN — CYANOCOBALAMIN 1000 MCG: 1000 INJECTION, SOLUTION INTRAMUSCULAR; SUBCUTANEOUS at 09:07

## 2017-08-21 DIAGNOSIS — E11.9 TYPE 2 DIABETES MELLITUS WITHOUT COMPLICATION, WITHOUT LONG-TERM CURRENT USE OF INSULIN: ICD-10-CM

## 2017-08-21 RX ORDER — BENAZEPRIL HYDROCHLORIDE 5 MG/1
5 TABLET ORAL DAILY
Qty: 90 TABLET | Refills: 3 | Status: SHIPPED | OUTPATIENT
Start: 2017-08-21 | End: 2018-12-13 | Stop reason: SDUPTHER

## 2017-08-21 RX ORDER — CLOPIDOGREL BISULFATE 75 MG/1
75 TABLET ORAL DAILY
Qty: 90 TABLET | Refills: 3 | Status: SHIPPED | OUTPATIENT
Start: 2017-08-21 | End: 2018-10-23 | Stop reason: SDUPTHER

## 2017-08-21 RX ORDER — METFORMIN HYDROCHLORIDE 750 MG/1
750 TABLET, EXTENDED RELEASE ORAL 2 TIMES DAILY WITH MEALS
Qty: 180 TABLET | Refills: 3 | Status: SHIPPED | OUTPATIENT
Start: 2017-08-21 | End: 2018-10-23 | Stop reason: SDUPTHER

## 2017-08-21 RX ORDER — ATORVASTATIN CALCIUM 20 MG/1
20 TABLET, FILM COATED ORAL DAILY
Qty: 180 TABLET | Refills: 3 | Status: SHIPPED | OUTPATIENT
Start: 2017-08-21 | End: 2018-05-29

## 2017-08-21 NOTE — TELEPHONE ENCOUNTER
----- Message from Shelia Saeed sent at 8/21/2017  8:21 AM CDT -----  Contact: Wife Beulah [phone]  She is requesting new prescriptions of benazepril 5 mg, plavix 75 mg, metformin  mg and lipitor 20 mg be sent to:   [facility] - [place], [place] - [address]  [address] [address]  Phone: [phone] Fax: [phone]    Thank you!

## 2017-09-20 ENCOUNTER — CLINICAL SUPPORT (OUTPATIENT)
Dept: CARDIOLOGY | Facility: CLINIC | Age: 82
End: 2017-09-20
Payer: MEDICARE

## 2017-09-20 DIAGNOSIS — I25.5 ISCHEMIC CARDIOMYOPATHY: ICD-10-CM

## 2017-09-20 DIAGNOSIS — E78.5 HYPERLIPIDEMIA, UNSPECIFIED HYPERLIPIDEMIA TYPE: ICD-10-CM

## 2017-09-20 DIAGNOSIS — I25.10 CORONARY ARTERY DISEASE INVOLVING NATIVE CORONARY ARTERY WITHOUT ANGINA PECTORIS, UNSPECIFIED WHETHER NATIVE OR TRANSPLANTED HEART: ICD-10-CM

## 2017-09-20 PROCEDURE — 93306 TTE W/DOPPLER COMPLETE: CPT | Mod: S$GLB,,, | Performed by: INTERNAL MEDICINE

## 2017-09-21 ENCOUNTER — LAB VISIT (OUTPATIENT)
Dept: LAB | Facility: HOSPITAL | Age: 82
End: 2017-09-21
Attending: INTERNAL MEDICINE
Payer: MEDICARE

## 2017-09-21 DIAGNOSIS — I25.5 ISCHEMIC CARDIOMYOPATHY: ICD-10-CM

## 2017-09-21 DIAGNOSIS — I25.10 CORONARY ARTERY DISEASE INVOLVING NATIVE CORONARY ARTERY WITHOUT ANGINA PECTORIS, UNSPECIFIED WHETHER NATIVE OR TRANSPLANTED HEART: ICD-10-CM

## 2017-09-21 DIAGNOSIS — E78.5 HYPERLIPIDEMIA, UNSPECIFIED HYPERLIPIDEMIA TYPE: ICD-10-CM

## 2017-09-21 LAB
ALBUMIN SERPL BCP-MCNC: 3.2 G/DL
ALP SERPL-CCNC: 77 U/L
ALT SERPL W/O P-5'-P-CCNC: 8 U/L
ANION GAP SERPL CALC-SCNC: 8 MMOL/L
AST SERPL-CCNC: 12 U/L
BILIRUB DIRECT SERPL-MCNC: 0.3 MG/DL
BILIRUB SERPL-MCNC: 0.5 MG/DL
BNP SERPL-MCNC: 85 PG/ML
BUN SERPL-MCNC: 20 MG/DL
CALCIUM SERPL-MCNC: 8.8 MG/DL
CHLORIDE SERPL-SCNC: 109 MMOL/L
CO2 SERPL-SCNC: 24 MMOL/L
CREAT SERPL-MCNC: 1.5 MG/DL
DIASTOLIC DYSFUNCTION: YES
EST. GFR  (AFRICAN AMERICAN): 48 ML/MIN/1.73 M^2
EST. GFR  (NON AFRICAN AMERICAN): 41.6 ML/MIN/1.73 M^2
ESTIMATED PA SYSTOLIC PRESSURE: 30.46
GLUCOSE SERPL-MCNC: 140 MG/DL
MITRAL VALVE MOBILITY: NORMAL
MITRAL VALVE REGURGITATION: ABNORMAL
POTASSIUM SERPL-SCNC: 4.5 MMOL/L
PROT SERPL-MCNC: 6.5 G/DL
RETIRED EF AND QEF - SEE NOTES: 40 (ref 55–65)
SODIUM SERPL-SCNC: 141 MMOL/L
TRICUSPID VALVE REGURGITATION: ABNORMAL
TSH SERPL DL<=0.005 MIU/L-ACNC: 0.76 UIU/ML

## 2017-09-21 PROCEDURE — 80048 BASIC METABOLIC PNL TOTAL CA: CPT

## 2017-09-21 PROCEDURE — 84443 ASSAY THYROID STIM HORMONE: CPT

## 2017-09-21 PROCEDURE — 36415 COLL VENOUS BLD VENIPUNCTURE: CPT | Mod: PO

## 2017-09-21 PROCEDURE — 83880 ASSAY OF NATRIURETIC PEPTIDE: CPT

## 2017-09-21 PROCEDURE — 80076 HEPATIC FUNCTION PANEL: CPT

## 2017-09-26 ENCOUNTER — OFFICE VISIT (OUTPATIENT)
Dept: CARDIOLOGY | Facility: CLINIC | Age: 82
End: 2017-09-26
Payer: MEDICARE

## 2017-09-26 ENCOUNTER — CLINICAL SUPPORT (OUTPATIENT)
Dept: CARDIOLOGY | Facility: CLINIC | Age: 82
End: 2017-09-26
Payer: MEDICARE

## 2017-09-26 VITALS
DIASTOLIC BLOOD PRESSURE: 61 MMHG | WEIGHT: 168.63 LBS | HEIGHT: 70 IN | HEART RATE: 62 BPM | BODY MASS INDEX: 24.14 KG/M2 | SYSTOLIC BLOOD PRESSURE: 145 MMHG

## 2017-09-26 DIAGNOSIS — I44.7 LBBB (LEFT BUNDLE BRANCH BLOCK): ICD-10-CM

## 2017-09-26 DIAGNOSIS — I25.5 ISCHEMIC CARDIOMYOPATHY: Primary | ICD-10-CM

## 2017-09-26 DIAGNOSIS — Z95.810 CARDIAC DEFIBRILLATOR IN SITU: ICD-10-CM

## 2017-09-26 DIAGNOSIS — I25.5 ISCHEMIC CARDIOMYOPATHY: ICD-10-CM

## 2017-09-26 DIAGNOSIS — E78.5 HYPERLIPIDEMIA, UNSPECIFIED HYPERLIPIDEMIA TYPE: ICD-10-CM

## 2017-09-26 DIAGNOSIS — I25.10 CORONARY ARTERY DISEASE INVOLVING NATIVE CORONARY ARTERY WITHOUT ANGINA PECTORIS, UNSPECIFIED WHETHER NATIVE OR TRANSPLANTED HEART: ICD-10-CM

## 2017-09-26 PROCEDURE — 1126F AMNT PAIN NOTED NONE PRSNT: CPT | Mod: S$GLB,,, | Performed by: INTERNAL MEDICINE

## 2017-09-26 PROCEDURE — 99999 PR PBB SHADOW E&M-EST. PATIENT-LVL II: CPT | Mod: PBBFAC,,, | Performed by: INTERNAL MEDICINE

## 2017-09-26 PROCEDURE — 1159F MED LIST DOCD IN RCRD: CPT | Mod: S$GLB,,, | Performed by: INTERNAL MEDICINE

## 2017-09-26 PROCEDURE — 99499 UNLISTED E&M SERVICE: CPT | Mod: S$GLB,,, | Performed by: INTERNAL MEDICINE

## 2017-09-26 PROCEDURE — 3008F BODY MASS INDEX DOCD: CPT | Mod: S$GLB,,, | Performed by: INTERNAL MEDICINE

## 2017-09-26 PROCEDURE — 99214 OFFICE O/P EST MOD 30 MIN: CPT | Mod: S$GLB,,, | Performed by: INTERNAL MEDICINE

## 2017-09-26 PROCEDURE — 93283 PRGRMG EVAL IMPLANTABLE DFB: CPT | Mod: S$GLB,,, | Performed by: INTERNAL MEDICINE

## 2017-09-26 PROCEDURE — 99999 PR PBB SHADOW E&M-EST. PATIENT-LVL II: CPT | Mod: PBBFAC,,,

## 2017-09-26 NOTE — PROGRESS NOTES
Subjective:    Patient ID:  Dom Guzman is a 86 y.o. male who presents for follow-up of Coronary Artery Disease       HPI  He comes for follow up with no major problems, no chest pain, no shortness of breath.  FC II    Review of Systems   Constitution: Negative for decreased appetite, weakness, malaise/fatigue, weight gain and weight loss.   Cardiovascular: Negative for chest pain, dyspnea on exertion, leg swelling, palpitations and syncope.   Respiratory: Negative for cough and shortness of breath.    Gastrointestinal: Negative.    All other systems reviewed and are negative.       Objective:    Physical Exam   Constitutional: He is oriented to person, place, and time. He appears well-developed and well-nourished.   HENT:   Head: Normocephalic.   Eyes: Pupils are equal, round, and reactive to light.   Neck: Normal range of motion. Neck supple. No JVD present. Carotid bruit is not present. No thyromegaly present.   Cardiovascular: Normal rate, regular rhythm, normal heart sounds, intact distal pulses and normal pulses.  PMI is not displaced.  Exam reveals no gallop.    No murmur heard.  Pulmonary/Chest: Effort normal and breath sounds normal.   Abdominal: Soft. Normal appearance. He exhibits no mass. There is no hepatosplenomegaly. There is no tenderness.   Musculoskeletal: Normal range of motion. He exhibits no edema.   Neurological: He is alert and oriented to person, place, and time. He has normal strength and normal reflexes. No sensory deficit.   Skin: Skin is warm and intact.   Psychiatric: He has a normal mood and affect.   Nursing note and vitals reviewed.    Echo reviewed      Assessment:       1. Ischemic cardiomyopathy    2. Coronary artery disease involving native coronary artery without angina pectoris, unspecified whether native or transplanted heart    3. Hyperlipidemia, unspecified hyperlipidemia type         Plan:     Continue all cardiac medications  Regular exercise program  9 m f/u

## 2017-10-09 RX ORDER — TEMAZEPAM 30 MG/1
30 CAPSULE ORAL NIGHTLY PRN
Qty: 90 CAPSULE | Refills: 1 | Status: SHIPPED | OUTPATIENT
Start: 2017-10-09 | End: 2018-04-02 | Stop reason: SDUPTHER

## 2017-11-15 ENCOUNTER — PATIENT OUTREACH (OUTPATIENT)
Dept: ADMINISTRATIVE | Facility: HOSPITAL | Age: 82
End: 2017-11-15

## 2017-11-15 NOTE — PROGRESS NOTES
Health Maintenance Due   Topic Date Due    Influenza Vaccine  08/01/2017    Hemoglobin A1c  11/19/2017

## 2017-11-16 ENCOUNTER — LAB VISIT (OUTPATIENT)
Dept: LAB | Facility: HOSPITAL | Age: 82
End: 2017-11-16
Attending: FAMILY MEDICINE
Payer: MEDICARE

## 2017-11-16 DIAGNOSIS — E11.9 TYPE 2 DIABETES MELLITUS WITHOUT COMPLICATION, UNSPECIFIED LONG TERM INSULIN USE STATUS: ICD-10-CM

## 2017-11-16 LAB
ALBUMIN SERPL BCP-MCNC: 3.4 G/DL
ALP SERPL-CCNC: 84 U/L
ALT SERPL W/O P-5'-P-CCNC: 10 U/L
ANION GAP SERPL CALC-SCNC: 7 MMOL/L
AST SERPL-CCNC: 15 U/L
BILIRUB SERPL-MCNC: 0.9 MG/DL
BUN SERPL-MCNC: 18 MG/DL
CALCIUM SERPL-MCNC: 9.4 MG/DL
CHLORIDE SERPL-SCNC: 107 MMOL/L
CHOLEST SERPL-MCNC: 118 MG/DL
CHOLEST/HDLC SERPL: 3.6 {RATIO}
CO2 SERPL-SCNC: 26 MMOL/L
CREAT SERPL-MCNC: 1.7 MG/DL
EST. GFR  (AFRICAN AMERICAN): 41.3 ML/MIN/1.73 M^2
EST. GFR  (NON AFRICAN AMERICAN): 35.7 ML/MIN/1.73 M^2
ESTIMATED AVG GLUCOSE: 157 MG/DL
GLUCOSE SERPL-MCNC: 146 MG/DL
HBA1C MFR BLD HPLC: 7.1 %
HDLC SERPL-MCNC: 33 MG/DL
HDLC SERPL: 28 %
LDLC SERPL CALC-MCNC: 65 MG/DL
NONHDLC SERPL-MCNC: 85 MG/DL
POTASSIUM SERPL-SCNC: 5 MMOL/L
PROT SERPL-MCNC: 6.8 G/DL
SODIUM SERPL-SCNC: 140 MMOL/L
TRIGL SERPL-MCNC: 100 MG/DL

## 2017-11-16 PROCEDURE — 83036 HEMOGLOBIN GLYCOSYLATED A1C: CPT

## 2017-11-16 PROCEDURE — 36415 COLL VENOUS BLD VENIPUNCTURE: CPT | Mod: PO

## 2017-11-16 PROCEDURE — 80061 LIPID PANEL: CPT

## 2017-11-16 PROCEDURE — 80053 COMPREHEN METABOLIC PANEL: CPT

## 2017-11-27 ENCOUNTER — OFFICE VISIT (OUTPATIENT)
Dept: DERMATOLOGY | Facility: CLINIC | Age: 82
End: 2017-11-27
Payer: MEDICARE

## 2017-11-27 VITALS — WEIGHT: 168 LBS | HEIGHT: 70 IN | BODY MASS INDEX: 24.05 KG/M2

## 2017-11-27 DIAGNOSIS — L81.4 SOLAR LENTIGO: ICD-10-CM

## 2017-11-27 DIAGNOSIS — Z85.828 HISTORY OF SKIN CANCER: ICD-10-CM

## 2017-11-27 DIAGNOSIS — L57.0 ACTINIC KERATOSES: ICD-10-CM

## 2017-11-27 DIAGNOSIS — D48.9 NEOPLASM OF UNCERTAIN BEHAVIOR: Primary | ICD-10-CM

## 2017-11-27 DIAGNOSIS — L82.1 SEBORRHEIC KERATOSES: ICD-10-CM

## 2017-11-27 PROCEDURE — 99213 OFFICE O/P EST LOW 20 MIN: CPT | Mod: 25,S$GLB,, | Performed by: DERMATOLOGY

## 2017-11-27 PROCEDURE — 88305 TISSUE EXAM BY PATHOLOGIST: CPT | Performed by: PATHOLOGY

## 2017-11-27 PROCEDURE — 99999 PR PBB SHADOW E&M-EST. PATIENT-LVL III: CPT | Mod: PBBFAC,,, | Performed by: DERMATOLOGY

## 2017-11-27 PROCEDURE — 11101 PR BIOPSY, EACH ADDED LESION: CPT | Mod: S$GLB,,, | Performed by: DERMATOLOGY

## 2017-11-27 PROCEDURE — 11100 PR BIOPSY OF SKIN LESION: CPT | Mod: 59,S$GLB,, | Performed by: DERMATOLOGY

## 2017-11-27 PROCEDURE — 17004 DESTROY PREMAL LESIONS 15/>: CPT | Mod: S$GLB,,, | Performed by: DERMATOLOGY

## 2017-11-27 NOTE — PATIENT INSTRUCTIONS
Shave Biopsy Wound Care    Your doctor has performed a shave biopsy today.  A band aid and vaseline ointment has been placed over the site.  This should remain in place for 24 hours.  It is recommended that you keep the area dry for the first 24 hours.  After 24 hours, you may remove the band aid and wash the area with warm soap and water and apply Vaseline jelly.  Many patients prefer to use Neosporin or Bacitracin ointment.  This is acceptable; however, know that you can develop an allergy to this medication even if you have used it safely for years.  It is important to keep the area moist.  Letting it dry out and get air slows healing time, and will worsen the scar.  Band aid is optional after first 24 hours.      If you notice increasing redness, tenderness, pain, or yellow drainage at the biopsy site, please notify your doctor.  These are signs of an infection.    If your biopsy site is bleeding, apply firm pressure for 15 minutes straight.  Repeat for another 15 minutes, if it is still bleeding.   If the surgical site continues to bleed, then please contact your doctor.       [facility]  [facility]  [address]  [place] [address]  Dept: [phone]       CRYOSURGERY      Your doctor has used a method called cryosurgery to treat your skin condition. Cryosurgery refers to the use of very cold substances to treat a variety of skin conditions such as warts, pre-skin cancers, molluscum contagiosum, sun spots, and several benign growths. The substance we use in cryosurgery is liquid nitrogen and is so cold (-195 degrees Celsius) that is burns when administered.     Following treatment in the office, the skin may immediately burn and become red. You may find the area around the lesion is affected as well. It is sometimes necessary to treat not only the lesion, but a small area of the surrounding normal skin to achieve a good response.     A blister, and even a blood filled blister,  may form after treatment.   This is a normal response. If the blister is painful, it is acceptable to sterilize a needle and with rubbing alcohol and gently pop the blister. It is important that you gently wash the area with soap and warm water as the blister fluid may contain wart virus if a wart was treated. Do no remove the roof of the blister.     The area treated can take anywhere from 1-3 weeks to heal. Healing time depends on the kind skin lesion treated, the location, and how aggressively the lesion was treated. It is recommended that the areas treated are covered with Vaseline or bacitracin ointment and a band-aid. If a band-aid is not practical, just ointment applied several times per day will do. Keeping these areas moist will speed the healing time.    Treatment with liquid nitrogen can leave a scar. In dark skin, it may be a light or dark scar, in light skin it may be a white or pink scar. These will generally fade with time, but may never go away completely.     If you have any concerns after your treatment, please feel free to call the office.         [facility]  [facility]  [address] [place] [address]  Dept: [phone]

## 2017-11-27 NOTE — PROGRESS NOTES
Subjective:       Patient ID:  Dom Guzman is a 87 y.o. male who presents for   Chief Complaint   Patient presents with    Skin Check     6 month UBSE    Spot     face     Patient last seen 5/2017 with AK's treated with cryo    Ophthalmology Dr Velez following left eye ectropion    H/o NMSCs:  BCC  07/07/2014 right infra-auricular  SCC nose s/p Mohs  This is a high risk patient here to check for the development of new lesions.          Spot  - Initial  Affected locations: face  Duration: on/off for years.  Signs / symptoms: dryness  Severity: mild  Timing: constant  Aggravated by: nothing  Relieving factors/Treatments tried: cryosurgery  Improvement on treatment: mild        Review of Systems   Constitutional: Negative for fever, chills, weight loss, weight gain, fatigue, night sweats and malaise.   Skin: Positive for activity-related sunscreen use and wears hat. Negative for daily sunscreen use.   Hematologic/Lymphatic: Bruises/bleeds easily.        Objective:    Physical Exam   Constitutional: He appears well-developed and well-nourished. No distress.   Neurological: He is alert and oriented to person, place, and time. He is not disoriented.   Psychiatric: He has a normal mood and affect.   Skin:   Areas Examined (abnormalities noted in diagram):   Scalp / Hair Palpated and Inspected  Head / Face Inspection Performed  Neck Inspection Performed  Chest / Axilla Inspection Performed  Abdomen Inspection Performed  Back Inspection Performed  RUE Inspected  LUE Inspection Performed                   Diagram Legend     Erythematous scaling macule/papule c/w actinic keratosis       Vascular papule c/w angioma      Pigmented verrucoid papule/plaque c/w seborrheic keratosis      Yellow umbilicated papule c/w sebaceous hyperplasia      Irregularly shaped tan macule c/w lentigo     1-2 mm smooth white papules consistent with Milia      Movable subcutaneous cyst with punctum c/w epidermal inclusion cyst       Subcutaneous movable cyst c/w pilar cyst      Firm pink to brown papule c/w dermatofibroma      Pedunculated fleshy papule(s) c/w skin tag(s)      Evenly pigmented macule c/w junctional nevus     Mildly variegated pigmented, slightly irregular-bordered macule c/w mildly atypical nevus      Flesh colored to evenly pigmented papule c/w intradermal nevus       Pink pearly papule/plaque c/w basal cell carcinoma      Erythematous hyperkeratotic cursted plaque c/w SCC      Surgical scar with no sign of skin cancer recurrence      Open and closed comedones      Inflammatory papules and pustules      Verrucoid papule consistent consistent with wart     Erythematous eczematous patches and plaques     Dystrophic onycholytic nail with subungual debris c/w onychomycosis     Umbilicated papule    Erythematous-base heme-crusted tan verrucoid plaque consistent with inflamed seborrheic keratosis     Erythematous Silvery Scaling Plaque c/w Psoriasis     See annotation              Assessment / Plan:      Pathology Orders:      Normal Orders This Visit    Tissue Specimen To Pathology, Dermatology     Questions:    Directional Terms:  Other(comment)    Clinical information:  1/2- R cheek 2/2- left infra-auricular cheek, BOTH pearly papules, BCC vs other    Specific Site:  1/2- R cheek 2/2- left infra-auricular cheek,        Neoplasm of uncertain behavior  -     Tissue Specimen To Pathology, Dermatology  Shave biopsy procedure note:x2    Shave biopsy performed after verbal consent including risk of infection, scar, recurrence, need for additional treatment of site. Area prepped with alcohol, anesthetized with approximately 1.0cc of 1% lidocaine with epinephrine. Lesional tissue shaved with razor blade. Hemostasis achieved with application of aluminum chloride; NO HYFRECATION- defibrillator. No complications. Dressing applied. Wound care explained.    Actinic keratoses  Cryosurgery Procedure Note    Verbal consent from the patient is  obtained and the patient is aware of the precancerous quality and need for treatment of these lesions. Liquid nitrogen cryosurgery is applied to the 26 actinic keratoses, as detailed in the physical exam, to produce a freeze injury. The patient is aware that blisters may form and is instructed on wound care with gentle cleansing and use of vaseline ointment to keep moist until healed. The patient is supplied a handout on cryosurgery and is instructed to call if lesions do not completely resolve.    History of skin cancer  Area of previous NMSC (nose and R infra-auricular cheek) examined. Site well healed with no signs of recurrence.  Upper body skin examination performed today including at least 9 points as noted in physical examination. No lesions suspicious for malignancy noted.    Solar lentigo  This is a benign hyperpigmented sun induced lesion. Daily sun protection will reduce the number of new lesions. Treatment of these benign lesions are considered cosmetic.    Seborrheic keratoses  These are benign inherited growths without a malignant potential. Reassurance given to patient. No treatment is necessary.     Patient instructed in importance in daily sun protection of at least spf 30. Sun avoidance and topical protection discussed.   Recommend Elta MD (physician office) COTZ sensitive (available online) for daily use on face and neck.  Patient encouraged to wear hat for all outdoor exposure.   Also discussed sun protective clothing.           Return in about 6 months (around 5/27/2018).

## 2017-11-28 ENCOUNTER — TELEPHONE (OUTPATIENT)
Dept: OPHTHALMOLOGY | Facility: CLINIC | Age: 82
End: 2017-11-28

## 2017-11-28 ENCOUNTER — OFFICE VISIT (OUTPATIENT)
Dept: OPHTHALMOLOGY | Facility: CLINIC | Age: 82
End: 2017-11-28
Payer: MEDICARE

## 2017-11-28 DIAGNOSIS — H02.105 ECTROPION OF BOTH LOWER EYELIDS, UNSPECIFIED ECTROPION TYPE: Primary | ICD-10-CM

## 2017-11-28 DIAGNOSIS — H35.30 MACULAR DEGENERATION: ICD-10-CM

## 2017-11-28 DIAGNOSIS — H02.102 ECTROPION OF BOTH LOWER EYELIDS, UNSPECIFIED ECTROPION TYPE: Primary | ICD-10-CM

## 2017-11-28 DIAGNOSIS — Z94.7 HISTORY OF CORNEAL TRANSPLANT: ICD-10-CM

## 2017-11-28 DIAGNOSIS — E11.9 DIABETES MELLITUS TYPE 2 WITHOUT RETINOPATHY: ICD-10-CM

## 2017-11-28 PROCEDURE — 92012 INTRM OPH EXAM EST PATIENT: CPT | Mod: S$GLB,,, | Performed by: OPHTHALMOLOGY

## 2017-11-28 PROCEDURE — 99499 UNLISTED E&M SERVICE: CPT | Mod: S$GLB,,, | Performed by: OPHTHALMOLOGY

## 2017-11-28 PROCEDURE — 99999 PR PBB SHADOW E&M-EST. PATIENT-LVL III: CPT | Mod: PBBFAC,,, | Performed by: OPHTHALMOLOGY

## 2017-11-28 NOTE — PROGRESS NOTES
HPI     Eye Problem    Additional comments: Left lower lid red and sub conj hem inferior            Comments   DLS: 3/7/17    Pt states redness on left lower lid got better after last visit but has   progressively gotten worse the last few weeks. + his wife noticed an   hemorrhage of left eye just below the lower lid x 4 days ago. Doing better   today. No pain or vision changes.     Gtts: Systane Ultra 3-4 x daily OU, Systane Jensen QHS OU.         Last edited by Cheryle Quintana on 11/28/2017 11:24 AM. (History)            Assessment /Plan     For exam results, see Encounter Report.    Ectropion of both lower eyelids, unspecified ectropion type  -     Ambulatory Referral to Ophthalmology    History of corneal transplant    Diabetes mellitus type 2 without retinopathy    Macular degeneration            Ectropion lower lids OU - minimal discomfort at this point, but continues to be very red despite artificial tears 2-3 times daily and ointment QHS. Referral placed for consultation with oculoplastics. Pt is on Plavix.     Few trichiasis lashes RUL, minimal discomfort - observe for now. Continue warm compresses, lid hygiene. Handout given last visit.    Diabetes mellitus type 2 without retinopathy - monitor annually, along with ARMD    Macular degeneration - more mottling/atrophy on last DFE than prior photos from 2008. Last OCT ok. Denies Amsler changes. VA OD not as good as last visit. RTC for MRx and DFE.     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

## 2017-11-29 ENCOUNTER — OFFICE VISIT (OUTPATIENT)
Dept: FAMILY MEDICINE | Facility: CLINIC | Age: 82
End: 2017-11-29
Payer: MEDICARE

## 2017-11-29 ENCOUNTER — TELEPHONE (OUTPATIENT)
Dept: OPHTHALMOLOGY | Facility: CLINIC | Age: 82
End: 2017-11-29

## 2017-11-29 VITALS
DIASTOLIC BLOOD PRESSURE: 68 MMHG | OXYGEN SATURATION: 97 % | HEART RATE: 69 BPM | BODY MASS INDEX: 23.77 KG/M2 | WEIGHT: 166 LBS | HEIGHT: 70 IN | RESPIRATION RATE: 16 BRPM | TEMPERATURE: 98 F | SYSTOLIC BLOOD PRESSURE: 124 MMHG

## 2017-11-29 DIAGNOSIS — E78.5 HYPERLIPIDEMIA, UNSPECIFIED HYPERLIPIDEMIA TYPE: ICD-10-CM

## 2017-11-29 DIAGNOSIS — I25.10 CORONARY ARTERY DISEASE INVOLVING NATIVE CORONARY ARTERY WITHOUT ANGINA PECTORIS, UNSPECIFIED WHETHER NATIVE OR TRANSPLANTED HEART: ICD-10-CM

## 2017-11-29 DIAGNOSIS — E11.9 TYPE 2 DIABETES MELLITUS WITHOUT COMPLICATION, UNSPECIFIED LONG TERM INSULIN USE STATUS: Primary | ICD-10-CM

## 2017-11-29 DIAGNOSIS — E13.40 NEUROPATHY DUE TO SECONDARY DIABETES: ICD-10-CM

## 2017-11-29 DIAGNOSIS — G47.00 INSOMNIA, UNSPECIFIED TYPE: ICD-10-CM

## 2017-11-29 PROCEDURE — G0008 ADMIN INFLUENZA VIRUS VAC: HCPCS | Mod: S$GLB,,, | Performed by: FAMILY MEDICINE

## 2017-11-29 PROCEDURE — 99999 PR PBB SHADOW E&M-EST. PATIENT-LVL IV: CPT | Mod: PBBFAC,,, | Performed by: FAMILY MEDICINE

## 2017-11-29 PROCEDURE — 99499 UNLISTED E&M SERVICE: CPT | Mod: S$GLB,,, | Performed by: FAMILY MEDICINE

## 2017-11-29 PROCEDURE — 99214 OFFICE O/P EST MOD 30 MIN: CPT | Mod: S$GLB,,, | Performed by: FAMILY MEDICINE

## 2017-11-29 PROCEDURE — 90662 IIV NO PRSV INCREASED AG IM: CPT | Mod: S$GLB,,, | Performed by: FAMILY MEDICINE

## 2017-11-29 NOTE — PROGRESS NOTES
Chief Complaint   Patient presents with    Diabetes     follow up      HPI:   Diabetes - Takes metformin  BID; Bgs 140 in AM. No hypoglycemia. tolerating Lotensin 5mg  Neuropathy has been persistent.  He is using a topical analgesic 4 times a day which is helping.    HLD - tolerating Lipitor 20mg daily  Insomnia - well- controlled on Restoril 30mg nightly.  CAD s/p NSTEMI - underwent successful PCI of LAD; ICD; taking plavix daily  Taking oral B12    PAST MEDICAL HISTORY:   B12 deficiency   Osteoarthritis  macular degeneration   dyslipidemia   Diabetes with neuropathy  CAD    PAST SURGICAL HISTORY:   Right hip replacement   eye surgery   blood clot in his head, status post celso hole  skin surgeries.     SOCIAL HISTORY: No tobacco. No alcohol. Lives with family.     FAMILY HISTORY: No prostate or colon cancer.     REVIEW OF SYSTEMS:     GENERAL: No fever, chills, fatigability or weight changes.  SKIN/BREASTS: No rashes, sores, itching or changes in color or texture of skin. No changes in moles.   HEAD: No headaches or recent head trauma.   EYES: Visual acuity fine. Denies blurriness, tearing, itching, photophobia, diplopia, or visual changes.   EARS: Denies ear pain, discharge, tinnitus or vertigo. Denies hearing loss.   NOSE: No loss of smell, epistaxis, postnasal drip, discharge, obstruction, or sneezing.  MOUTH & THROAT: No hoarseness, change in voice, swallowing difficulty. No excessive gum bleeding.   HEMATOLOGICAL/NODES: Denies swollen glands. No bleeding or bruising.  CHEST: No ROBERTS, cyanosis, wheezing, cough or sputum production.  CARDIOVASCULAR: Denies chest pain, dyspnea, orthopnea, or palpitations.  GI/ABDOMEN: Appetite fine. No weight loss. Denies nausea, vomiting, diarrhea, constipation, abdominal pain, hematemesis or blood in stool.  URINARY: No dysuria,hematuria, nocturia, incontinence, flank pain, urgency, or urinary difficulty.  PERIPHERAL VASCULAR: No claudication, cold intolerance or  "cyanosis.  Reports numbness to feet and pain, which has been chronic. No worsening, no major improvement since starting Neurontin.  NEUROLOGIC: No history of seizures, paralysis, alteration of gait or coordination.    PSYCH:  No crying spells. Denies anxiety symptoms.    PHYSICAL EXAM:     /68 (BP Location: Left arm, Patient Position: Sitting, BP Method: Medium (Manual))   Pulse 69   Temp 98.1 °F (36.7 °C) (Oral)   Resp 16   Ht 5' 10" (1.778 m)   Wt 75.3 kg (166 lb 0.1 oz)   SpO2 97%   BMI 23.82 kg/m²     GENERAL: He is a well-developed, well-nourished male, who appears in no apparent distress.   HEENT: NC/AT. PERRLA. EOMI. Mucous membranes are moist.   NECK: Supple. No carotid bruits. No thyroid enlargement.   CHEST: Clear to auscultation bilaterally with no crackles, rhonchi or wheezes.   CARDIOVASCULAR: Reveals a regular rate and rhythm. Normal S1 and S2. No murmurs, rubs or gallops are noted.   ABDOMEN: Positive bowel sounds, soft, and nontender. No HSM. No masses.   EXTREMITIES: No edema noted and pulses 2+ bilaterally.     Results for orders placed or performed in visit on 11/16/17   Hemoglobin A1c   Result Value Ref Range    Hemoglobin A1C 7.1 (H) 4.0 - 5.6 %    Estimated Avg Glucose 157 (H) 68 - 131 mg/dL   Lipid panel   Result Value Ref Range    Cholesterol 118 (L) 120 - 199 mg/dL    Triglycerides 100 30 - 150 mg/dL    HDL 33 (L) 40 - 75 mg/dL    LDL Cholesterol 65.0 63.0 - 159.0 mg/dL    HDL/Chol Ratio 28.0 20.0 - 50.0 %    Total Cholesterol/HDL Ratio 3.6 2.0 - 5.0    Non-HDL Cholesterol 85 mg/dL   Comprehensive metabolic panel   Result Value Ref Range    Sodium 140 136 - 145 mmol/L    Potassium 5.0 3.5 - 5.1 mmol/L    Chloride 107 95 - 110 mmol/L    CO2 26 23 - 29 mmol/L    Glucose 146 (H) 70 - 110 mg/dL    BUN, Bld 18 8 - 23 mg/dL    Creatinine 1.7 (H) 0.5 - 1.4 mg/dL    Calcium 9.4 8.7 - 10.5 mg/dL    Total Protein 6.8 6.0 - 8.4 g/dL    Albumin 3.4 (L) 3.5 - 5.2 g/dL    Total Bilirubin 0.9 " 0.1 - 1.0 mg/dL    Alkaline Phosphatase 84 55 - 135 U/L    AST 15 10 - 40 U/L    ALT 10 10 - 44 U/L    Anion Gap 7 (L) 8 - 16 mmol/L    eGFR if African American 41.3 (A) >60 mL/min/1.73 m^2    eGFR if non  35.7 (A) >60 mL/min/1.73 m^2     A/P:  Type 2 diabetes mellitus without complication, unspecified long term insulin use status  -     Hemoglobin A1c; Future; Expected date: 05/28/2018  -     Comprehensive metabolic panel; Future; Expected date: 05/28/2018  -     Lipid panel; Future; Expected date: 05/28/2018  -     Microalbumin/creatinine urine ratio; Future; Expected date: 05/28/2018    Hyperlipidemia, unspecified hyperlipidemia type    Coronary artery disease involving native coronary artery without angina pectoris, unspecified whether native or transplanted heart  -     CBC auto differential; Future; Expected date: 05/28/2018    Insomnia, unspecified type    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

## 2017-11-29 NOTE — TELEPHONE ENCOUNTER
Called and spoke with pt's wife concerning referral lid eval. Pt decided he would rather see the [facility] lid doctor than see Dr Regalado. Let Mrs Guzman know that I would ask Dr Rothman to put in referral to Dr Montelongo and has his tech call them to schedule appt.

## 2017-11-29 NOTE — TELEPHONE ENCOUNTER
----- Message from Tyler Panchal sent at 11/29/2017  8:19 AM CST -----  Contact: Wife/Beulah  Sharon called in regarding the attached patient () and is requesting that the referral that went to Dr. Kris Regalado is [place] be resent to the other physician Dr. Rothman had mentioned that was at [facility].  Patient does not want to go to Dr. Regalado.  Patient call back number is [phone]

## 2018-01-15 ENCOUNTER — TELEPHONE (OUTPATIENT)
Dept: DERMATOLOGY | Facility: CLINIC | Age: 83
End: 2018-01-15

## 2018-01-22 ENCOUNTER — TELEPHONE (OUTPATIENT)
Dept: OPHTHALMOLOGY | Facility: CLINIC | Age: 83
End: 2018-01-22

## 2018-02-21 DIAGNOSIS — I25.5 ISCHEMIC CARDIOMYOPATHY: ICD-10-CM

## 2018-02-21 DIAGNOSIS — E13.40 NEUROPATHY DUE TO SECONDARY DIABETES: Primary | ICD-10-CM

## 2018-02-28 ENCOUNTER — TELEPHONE (OUTPATIENT)
Dept: FAMILY MEDICINE | Facility: CLINIC | Age: 83
End: 2018-02-28

## 2018-02-28 NOTE — TELEPHONE ENCOUNTER
----- Message from Coty Fernando sent at 2/28/2018 11:11 AM CST -----  Contact: Wife  Beulah, wife [phone], Calling about the walker that was to be ordered for the patient. They requested a week ago. Please advise.thanks.

## 2018-02-28 NOTE — TELEPHONE ENCOUNTER
Spoke with [facility] and they had not received the walker order so I have re faxed the order to them. Notified spouse that they should be contacting them that I have resent the orders. Verbalized understanding.

## 2018-03-19 ENCOUNTER — CLINICAL SUPPORT (OUTPATIENT)
Dept: CARDIOLOGY | Facility: CLINIC | Age: 83
End: 2018-03-19
Attending: INTERNAL MEDICINE
Payer: MEDICARE

## 2018-03-19 DIAGNOSIS — I25.5 ISCHEMIC CARDIOMYOPATHY: ICD-10-CM

## 2018-03-19 DIAGNOSIS — Z95.810 CARDIAC DEFIBRILLATOR IN SITU: Primary | ICD-10-CM

## 2018-03-19 DIAGNOSIS — Z95.810 CARDIAC DEFIBRILLATOR IN SITU: ICD-10-CM

## 2018-03-19 PROCEDURE — 93296 REM INTERROG EVL PM/IDS: CPT | Mod: S$GLB,,, | Performed by: INTERNAL MEDICINE

## 2018-03-19 PROCEDURE — 93295 DEV INTERROG REMOTE 1/2/MLT: CPT | Mod: S$GLB,,, | Performed by: INTERNAL MEDICINE

## 2018-03-28 ENCOUNTER — PROCEDURE VISIT (OUTPATIENT)
Dept: DERMATOLOGY | Facility: CLINIC | Age: 83
End: 2018-03-28
Payer: MEDICARE

## 2018-03-28 VITALS — BODY MASS INDEX: 23.77 KG/M2 | WEIGHT: 166 LBS | HEIGHT: 70 IN

## 2018-03-28 DIAGNOSIS — C44.319 BASAL CELL CARCINOMA (BCC) OF CHEEK: ICD-10-CM

## 2018-03-28 DIAGNOSIS — L57.0 ACTINIC KERATOSES: Primary | ICD-10-CM

## 2018-03-28 DIAGNOSIS — D48.9 NEOPLASM OF UNCERTAIN BEHAVIOR: ICD-10-CM

## 2018-03-28 PROCEDURE — 17000 DESTRUCT PREMALG LESION: CPT | Mod: S$GLB,,, | Performed by: DERMATOLOGY

## 2018-03-28 PROCEDURE — 88305 TISSUE EXAM BY PATHOLOGIST: CPT | Performed by: PATHOLOGY

## 2018-03-28 PROCEDURE — 11100 PR BIOPSY OF SKIN LESION: CPT | Mod: 59,S$GLB,, | Performed by: DERMATOLOGY

## 2018-03-28 PROCEDURE — 99499 UNLISTED E&M SERVICE: CPT | Mod: S$GLB,,, | Performed by: DERMATOLOGY

## 2018-03-28 PROCEDURE — 88305 TISSUE EXAM BY PATHOLOGIST: CPT | Mod: 26,,, | Performed by: PATHOLOGY

## 2018-03-28 PROCEDURE — 17003 DESTRUCT PREMALG LES 2-14: CPT | Mod: S$GLB,,, | Performed by: DERMATOLOGY

## 2018-03-28 NOTE — PROGRESS NOTES
Subjective:       Patient ID:  Dom Guzman is a 87 y.o. male who presents for   Chief Complaint   Patient presents with    Basal Cell Carcinoma     procedure     Patient here for definitive excision of BCC x 2 biopsied 11/2017  Patient and wife state site completely healed  Procedure delayed by other health issues, 4 months since biopsy  New complaints of non healing spot on dorsal nose, near site of previous Mohs (BCC 9/2013, Dr England)    FINAL PATHOLOGIC DIAGNOSIS  1. Skin, right cheek, shave biopsy:  - BASAL CELL CARCINOMA.  - THE TUMOR EXTENDS TO THE DEEP AND LATERAL BIOPSY MARGINS.  MICROSCOPIC DESCRIPTION: Sections show a basaloid tumor within the dermis exhibiting peripheral palisading,  retraction artifact and apoptosis.  2. Skin, left infra-auricular cheek, shave biopsy:  - BASAL CELL CARCINOMA WITH NODULAR GROWTH PATTERN.  - THE TUMOR EXTENDS TO THE DEEP BIOPSY MARGIN.      Basal Cell Carcinoma         Review of Systems     Objective:    Physical Exam   Constitutional: He appears well-developed and well-nourished. No distress.   Neurological: He is alert and oriented to person, place, and time. He is not disoriented.   Psychiatric: He has a normal mood and affect.   Skin:   Areas Examined (abnormalities noted in diagram):   Head / Face Inspection Performed              Diagram Legend     Erythematous scaling macule/papule c/w actinic keratosis       Vascular papule c/w angioma      Pigmented verrucoid papule/plaque c/w seborrheic keratosis      Yellow umbilicated papule c/w sebaceous hyperplasia      Irregularly shaped tan macule c/w lentigo     1-2 mm smooth white papules consistent with Milia      Movable subcutaneous cyst with punctum c/w epidermal inclusion cyst      Subcutaneous movable cyst c/w pilar cyst      Firm pink to brown papule c/w dermatofibroma      Pedunculated fleshy papule(s) c/w skin tag(s)      Evenly pigmented macule c/w junctional nevus     Mildly variegated pigmented, slightly  irregular-bordered macule c/w mildly atypical nevus      Flesh colored to evenly pigmented papule c/w intradermal nevus       Pink pearly papule/plaque c/w basal cell carcinoma      Erythematous hyperkeratotic cursted plaque c/w SCC      Surgical scar with no sign of skin cancer recurrence      Open and closed comedones      Inflammatory papules and pustules      Verrucoid papule consistent consistent with wart     Erythematous eczematous patches and plaques     Dystrophic onycholytic nail with subungual debris c/w onychomycosis     Umbilicated papule    Erythematous-base heme-crusted tan verrucoid plaque consistent with inflamed seborrheic keratosis     Erythematous Silvery Scaling Plaque c/w Psoriasis     See annotation            Assessment / Plan:      Pathology Orders:     Normal Orders This Visit    Tissue Specimen To Pathology, Dermatology     Questions:    Directional Terms:  Other(comment)    Clinical information:  non healing ulcerated papule adjacent to Mohs scar, r/o BCC recurrence vs SCC    Specific Site:  R nasal sidewall        Actinic keratoses  Cryosurgery Procedure Note    Verbal consent from the patient is obtained and the patient is aware of the precancerous quality and need for treatment of these lesions. Liquid nitrogen cryosurgery is applied to the 4 actinic keratoses, as detailed in the physical exam, to produce a freeze injury. The patient is aware that blisters may form and is instructed on wound care with gentle cleansing and use of vaseline ointment to keep moist until healed. The patient is supplied a handout on cryosurgery and is instructed to call if lesions do not completely resolve.    Basal cell carcinoma (BCC) of cheek  R cheek and left infra-auricular cheek  No residual lesion on clinical exam  Discussed with patient and wife - standard of care is re-excision given presence of BCC at deep bx margins at the time of biopsy. Cannot rule out deep focus with future recurrence. However,  given absence of identifiable residual lesion, patient elects to continue to monitor site. Will call if any change is noted    NUB  Shave biopsy procedure note:    Shave biopsy performed after verbal consent including risk of infection, scar, recurrence, need for additional treatment of site. Area prepped with alcohol, anesthetized with approximately 1.0cc of 1% lidocaine with epinephrine. Lesional tissue shaved with razor blade. Hemostasis achieved with application of aluminum chloride followed by hyfrecation. No complications. Dressing applied. Wound care explained.               Follow-up in about 3 months (around 6/28/2018).

## 2018-03-28 NOTE — PATIENT INSTRUCTIONS
Shave Biopsy Wound Care    Your doctor has performed a shave biopsy today.  A band aid and vaseline ointment has been placed over the site.  This should remain in place for 24 hours.  It is recommended that you keep the area dry for the first 24 hours.  After 24 hours, you may remove the band aid and wash the area with warm soap and water and apply Vaseline jelly.  Many patients prefer to use Neosporin or Bacitracin ointment.  This is acceptable; however, know that you can develop an allergy to this medication even if you have used it safely for years.  It is important to keep the area moist.  Letting it dry out and get air slows healing time, and will worsen the scar.  Band aid is optional after first 24 hours.      If you notice increasing redness, tenderness, pain, or yellow drainage at the biopsy site, please notify your doctor.  These are signs of an infection.    If your biopsy site is bleeding, apply firm pressure for 15 minutes straight.  Repeat for another 15 minutes, if it is still bleeding.   If the surgical site continues to bleed, then please contact your doctor.       [facility]  [facility]  [address]  [place] [address]  Dept: [phone]       CRYOSURGERY      Your doctor has used a method called cryosurgery to treat your skin condition. Cryosurgery refers to the use of very cold substances to treat a variety of skin conditions such as warts, pre-skin cancers, molluscum contagiosum, sun spots, and several benign growths. The substance we use in cryosurgery is liquid nitrogen and is so cold (-195 degrees Celsius) that is burns when administered.     Following treatment in the office, the skin may immediately burn and become red. You may find the area around the lesion is affected as well. It is sometimes necessary to treat not only the lesion, but a small area of the surrounding normal skin to achieve a good response.     A blister, and even a blood filled blister,  may form after treatment.   This is a normal response. If the blister is painful, it is acceptable to sterilize a needle and with rubbing alcohol and gently pop the blister. It is important that you gently wash the area with soap and warm water as the blister fluid may contain wart virus if a wart was treated. Do no remove the roof of the blister.     The area treated can take anywhere from 1-3 weeks to heal. Healing time depends on the kind skin lesion treated, the location, and how aggressively the lesion was treated. It is recommended that the areas treated are covered with Vaseline or bacitracin ointment and a band-aid. If a band-aid is not practical, just ointment applied several times per day will do. Keeping these areas moist will speed the healing time.    Treatment with liquid nitrogen can leave a scar. In dark skin, it may be a light or dark scar, in light skin it may be a white or pink scar. These will generally fade with time, but may never go away completely.     If you have any concerns after your treatment, please feel free to call the office.         [facility]  [facility]  [address] [address]  Dept: [phone]

## 2018-04-03 RX ORDER — TEMAZEPAM 30 MG/1
30 CAPSULE ORAL NIGHTLY PRN
Qty: 90 CAPSULE | Refills: 1 | Status: SHIPPED | OUTPATIENT
Start: 2018-04-03 | End: 2018-09-19 | Stop reason: SDUPTHER

## 2018-04-06 ENCOUNTER — TELEPHONE (OUTPATIENT)
Dept: DERMATOLOGY | Facility: CLINIC | Age: 83
End: 2018-04-06

## 2018-04-06 NOTE — TELEPHONE ENCOUNTER
----- Message from Maria Isabel Wolff LPN sent at 4/6/2018  1:37 PM CDT -----  Patient and wife would like to set up consult with Dr. Alejo for after eye surgery set for 4/19.  Verbalized understanding of BCC and consult for probable MOHS.

## 2018-04-11 ENCOUNTER — TELEPHONE (OUTPATIENT)
Dept: DERMATOLOGY | Facility: CLINIC | Age: 83
End: 2018-04-11

## 2018-04-11 NOTE — TELEPHONE ENCOUNTER
----- Message from Svitlana Lucio sent at 4/11/2018  8:05 AM CDT -----   Pt wife  Calling to  Cancel  Yao //[phone] wife  ilene

## 2018-04-13 ENCOUNTER — PES CALL (OUTPATIENT)
Dept: ADMINISTRATIVE | Facility: CLINIC | Age: 83
End: 2018-04-13

## 2018-04-24 ENCOUNTER — INITIAL CONSULT (OUTPATIENT)
Dept: DERMATOLOGY | Facility: CLINIC | Age: 83
End: 2018-04-24
Payer: MEDICARE

## 2018-04-24 VITALS
SYSTOLIC BLOOD PRESSURE: 140 MMHG | HEIGHT: 69 IN | DIASTOLIC BLOOD PRESSURE: 74 MMHG | WEIGHT: 162 LBS | BODY MASS INDEX: 23.99 KG/M2

## 2018-04-24 DIAGNOSIS — C44.311 BASAL CELL CARCINOMA (BCC) OF DORSUM OF NOSE: ICD-10-CM

## 2018-04-24 DIAGNOSIS — Z95.810 PRESENCE OF AUTOMATIC (IMPLANTABLE) CARDIAC DEFIBRILLATOR: ICD-10-CM

## 2018-04-24 DIAGNOSIS — C44.311 BASAL CELL CARCINOMA OF NOSE: Primary | ICD-10-CM

## 2018-04-24 PROCEDURE — 99999 PR PBB SHADOW E&M-EST. PATIENT-LVL III: CPT | Mod: PBBFAC,,, | Performed by: DERMATOLOGY

## 2018-04-24 PROCEDURE — 99214 OFFICE O/P EST MOD 30 MIN: CPT | Mod: S$GLB,,, | Performed by: DERMATOLOGY

## 2018-04-24 NOTE — PROGRESS NOTES
+++ HAS A DEFIBRILLATOR +++   ALLERGIES:  Carvedilol and Codeine    CHIEF COMPLAINT:  This 87 y.o. male comes for evaluation for Mohs' Micrographic Surgery, Fresh Tissue Technique, for treatment of a biopsy-proven basal cell carcinoma on the  Right nasal sidewal. Consultation requested by Josette Leal M.D.    HISTORY OF PRESENT ILLNESS:   Location: Right Nasal Sidewall   Duration: 3 months   Quality: persistent  Context: status post biopsy by [facility]; path = basal cell carcinoma; pathology accession #[id number], [facility]     Prior Treatment: none  See also the handwritten notes/diagrams scanned to chart for additional details.    Defibrillator: Yes +++ HAS A DEFIBRILLATOR +++   Pacemaker: No  Artificial heart valves: No  Artificial joints: Yes:  right hip 13+ years ago    REVIEW OF SYSTEMS:   General: general health good  Skin: has previous history of skin cancer(s); prior Mohs surgery to dorsal nose by Dr. England in 01/2014  CV: has hypertension, no artificial valves, has no chest pain; has ischemic cardiomyopathy; +++ HAS A DEFIBRILLATOR +++   Resp: has shortness of breath on exertion  Endo: has diabetes; not on insulin  Hem/Lymph: taking prescribed anticoagulants-PLAVIX, has easy bruising/bleeding  Allergy/Immuno: has allergies as noted above  GI: has no history of hepatitis  MS: as noted above     PAST MEDICAL HISTORY:  Past Medical History:   Diagnosis Date    Actinic keratosis     Anticoagulant long-term use     plavix    B12 deficiency anemia     Basal cell carcinoma 07/07/2014    right infra-auricular    Blepharoconjunctivitis of both eyes     Coronary artery disease 01/2017    1 stent 7/2016    Diabetes mellitus type II     Ectropion of eyelid     Lower lids    HLD (hyperlipidemia)     Insomnia     Ischemic cardiomyopathy 01/2017    Macular degeneration     Neuropathy due to secondary diabetes     Posterior vitreous detachment of both eyes     SQUAMOUS CELL CARCINOMA        PAST  SURGICAL HISTORY:  Past Surgical History:   Procedure Laterality Date    CATARACT EXTRACTION      OU    CORNEAL TRANSPLANT Left 1995    CORONARY ANGIOPLASTY WITH STENT PLACEMENT  07/2016    DSEK- OS      EYE SURGERY      HIP SURGERY      total hip replacement    JOINT REPLACEMENT          SOCIAL HISTORY:  Dependencies: smoking status as noted below  Social History   Substance Use Topics    Smoking status: Never Smoker    Smokeless tobacco: Never Used    Alcohol use No       PERTINENT MEDICATIONS:  See medications list.    Current Outpatient Prescriptions:     alcohol swabs PadM, Apply 1 each topically as needed., Disp: 100 each, Rfl: 3    atorvastatin (LIPITOR) 20 MG tablet, Take 1 tablet (20 mg total) by mouth once daily., Disp: 180 tablet, Rfl: 3    benazepril (LOTENSIN) 5 MG tablet, Take 1 tablet (5 mg total) by mouth once daily., Disp: 90 tablet, Rfl: 3    blood glucose control, normal Soln, Check glucose daily, Disp: 1 each, Rfl: 3    blood sugar diagnostic Strp, Check glucose daily, Disp: 100 each, Rfl: 3    blood-glucose meter Misc, Check glucose daily, Disp: 1 each, Rfl: 0    clopidogrel (PLAVIX) 75 mg tablet, Take 1 tablet (75 mg total) by mouth once daily., Disp: 90 tablet, Rfl: 3    lidocaine-prilocaine (EMLA) cream, , Disp: , Rfl:     metformin (GLUCOPHAGE-XR) 750 MG 24 hr tablet, Take 1 tablet (750 mg total) by mouth 2 (two) times daily with meals., Disp: 180 tablet, Rfl: 3    MINERAL OIL/PETROLATUM,WHITE (SYSTANE NIGHTTIME OPHT), Apply to eye every evening., Disp: , Rfl:     PROPYLENE GLYCOL/ (SYSTANE ULTRA OPHT), Place 1 drop into both eyes 3 (three) times daily., Disp: , Rfl:     temazepam (RESTORIL) 30 mg capsule, TAKE 1 CAPSULE (30 MG TOTAL) BY MOUTH NIGHTLY AS NEEDED., Disp: 90 capsule, Rfl: 1    Current Facility-Administered Medications:     cyanocobalamin injection 1,000 mcg, 1,000 mcg, Intramuscular, Q30 Days, Carlos Eduardo Tobar MD, 1,000 mcg at 07/19/17  0947    ALLERGIES:  Carvedilol and Codeine    EXAM:  See also the handwritten notes/diagrams scanned to chart for additional details.  Constitutional  General appearance: well-developed, well-nourished, well-kempt older white male    Eyes  Inspection of conjunctivae and lids reveals bilateral ectropion, left worse than right; sclerae anicteric  Neurologic/Psychiatric  Alert,  normal orientation to time, place, person  Normal mood and affect with no evidence of depression, anxiety, agitation  Skin: see photo(s)  Head: background marked solar damage to exposed areas of skin; in addition, inspection/palpation reveals an approximately 5 mm pink plaque on the right nose which feels freely movable over the underlying tissues on palpation;  he confirmed this as the site of the prior biopsy; there are curvilinear scars from prior surgery on the dorsal nose and a well-healed skin graft to the lower nose from a surgery by Dr. Mustafa prior to 2005  Neck: examination reveals marked chronic solar damage  Right upper extremity: examination reveals marked chronic solar damage; some ecchymosis/ecchymoses   Left upper extremity: examination reveals marked chronic solar damage; some ecchymosis/ecchymoses     ASSESSMENT: biopsy-proven basal cell carcinoma of the right nose sidewall  chronic solar damage to areas as noted above  personal history of non-melanoma skin cancer    PLAN:  The diagnosis and management options, and risks and benefits of the alternatives, including observation/non-treatment, radiation treatment, excision with vertical frozen section or paraffin-embedded section margin evaluation, and Mohs' Micrographic Surgery, Fresh Tissue Technique, were discussed at length with the patient. In particular, the discussion included, but was not limited to, the following:    One alternative at this point would be to defer further treatment and observe the lesion. With small skin cancers of this kind, it is possible that a biopsy  can be sufficient to definitively treat a small skin cancer of this kind. Alternatively, some skin cancers are slow growing and do not require immediate treatment. The potential advantage of this choice would be to avoid the need for possibly unnecessary additional surgery. Among the potential disadvantages of this would be the possibility of enlargement of the lesion, more extensive spread of the lesion or recurrence at a later date, which might necessitate a larger and more complex surgery.    Radiation treatment can be an effective treatment for this type of skin cancer. The usual course of treatment is every weekday for several weeks. Local irritation will result from treatment, although no systemic side effects are expected. The potential advantage of radiation treatment is that it avoids the need for surgery. Among the disadvantages of radiation treatment are the length of treatment, the local inflammatory response, the absence of pathologic confirmation of the removal of the skin cancer, a possible increased risk of additional skin cancer in the treated area in later years, and a somewhat increased risk of recurrence at a later date.     Excisional surgery can be an effective treatment for this type of skin cancer. This would involve excision of the lesion with margin evaluation by submitting the specimen to a pathologist for either immediate marginal assessment via frozen section processing, or delayed marginal assessment by fixed-tissue processing. The potential advantage of this technique is that it offers a way of treating the lesion with some degree of histologic confirmation of tumor removal. Among the disadvantages of this treatment are the possible need for re-excision if marginal involvement is identified, a somewhat greater likelihood of recurrence as compared to Mohs' surgery because of the less comprehensive margin evaluation inherent in the technique, and the general potential risks of surgery,  including allergic reactions to the anesthetic and other materials used, infection, injury to nerves in the area with consequent loss of sensation or muscle function, and scarring or distortion of surrounding structures.    Mohs' surgery is a very effective treatment for this type of skin cancer. The potential advantage of Mohs' surgery is that this technique offers the greatest possible certainty of knowing that the skin cancer has been completely removed, with the removal of the least amount of normal tissue. The potential disadvantages of Mohs' surgery include the duration of the surgery, the possible need for a separate surgery for reconstruction following tumor removal, and scarring as a result. In addition, general potential risks of surgery as noted above also apply to treatment via Mohs' surgery.    In light of the nature of this tumor and the location on the nose in an area of increased risk of recurrence,  Mohs' micrographic surgery was thought to be the most appropriate management choice, and this diagnosis is appropriate for treatment by Mohs' micrographic surgery.     Sufficient time was available for questions, and all questions were answered to his satisfaction. He fully understands the aims, risks, alternatives, and possible complications, and has elected to proceed with the surgery, and verbally consented to do so. The procedure will be scheduled in the near future; in about 2 months, after he and his wife deal with some health issues she is experiencing.    Routine pre-op instructions were given to him.    --------------------------------------  Note: Some or all of this note may have been generated using voice recognition software. There may be voice recognition errors including grammatical and/or spelling errors found in the text. Attempts were made to correct these errors prior to signature.

## 2018-04-24 NOTE — LETTER
April 24, 2018      Josette Leal MD  [address] [address]           [facility]  [address] [facility] [address] [place] [address]  Phone: [phone]          Patient: Dom Guzman   MR Number: [id number]   YOB: 1930   Date of Visit: 4/24/2018       Dear Dr. Josette Leal:    Thank you for referring Dom Guzman to me for evaluation. Attached you will find relevant portions of my assessment and plan of care.    If you have questions, please do not hesitate to call me. I look forward to following Dom Guzman along with you.    Sincerely,    Sandro Alejo MD    Enclosure  CC:  No Recipients    If you would like to receive this communication electronically, please contact [email or web address] or [phone] to request more information on [employer] Link access.    For providers and/or their staff who would like to refer a patient to [facility], please contact us through our one-stop-shop provider referral line, [facility] , at [phone].    If you feel you have received this communication in error or would no longer like to receive these types of communications, please e-mail [email or web address]

## 2018-05-15 ENCOUNTER — PATIENT OUTREACH (OUTPATIENT)
Dept: ADMINISTRATIVE | Facility: HOSPITAL | Age: 83
End: 2018-05-15

## 2018-05-15 NOTE — PROGRESS NOTES
Health Maintenance Due   Topic Date Due    Hemoglobin A1c  05/16/2018    Foot Exam  05/22/2018

## 2018-05-23 ENCOUNTER — LAB VISIT (OUTPATIENT)
Dept: LAB | Facility: HOSPITAL | Age: 83
End: 2018-05-23
Attending: FAMILY MEDICINE
Payer: MEDICARE

## 2018-05-23 DIAGNOSIS — E11.9 TYPE 2 DIABETES MELLITUS WITHOUT COMPLICATION: ICD-10-CM

## 2018-05-23 DIAGNOSIS — I25.10 CORONARY ARTERY DISEASE INVOLVING NATIVE CORONARY ARTERY WITHOUT ANGINA PECTORIS, UNSPECIFIED WHETHER NATIVE OR TRANSPLANTED HEART: ICD-10-CM

## 2018-05-23 LAB
ALBUMIN SERPL BCP-MCNC: 3.4 G/DL
ALP SERPL-CCNC: 75 U/L
ALT SERPL W/O P-5'-P-CCNC: 8 U/L
ANION GAP SERPL CALC-SCNC: 8 MMOL/L
AST SERPL-CCNC: 10 U/L
BASOPHILS # BLD AUTO: 0.03 K/UL
BASOPHILS NFR BLD: 0.4 %
BILIRUB SERPL-MCNC: 0.4 MG/DL
BUN SERPL-MCNC: 22 MG/DL
CALCIUM SERPL-MCNC: 8.9 MG/DL
CHLORIDE SERPL-SCNC: 106 MMOL/L
CHOLEST SERPL-MCNC: 175 MG/DL
CHOLEST/HDLC SERPL: 4.6 {RATIO}
CO2 SERPL-SCNC: 24 MMOL/L
CREAT SERPL-MCNC: 1.3 MG/DL
DIFFERENTIAL METHOD: ABNORMAL
EOSINOPHIL # BLD AUTO: 0.2 K/UL
EOSINOPHIL NFR BLD: 2.1 %
ERYTHROCYTE [DISTWIDTH] IN BLOOD BY AUTOMATED COUNT: 12.6 %
EST. GFR  (AFRICAN AMERICAN): 56.7 ML/MIN/1.73 M^2
EST. GFR  (NON AFRICAN AMERICAN): 49.1 ML/MIN/1.73 M^2
ESTIMATED AVG GLUCOSE: 157 MG/DL
GLUCOSE SERPL-MCNC: 132 MG/DL
HBA1C MFR BLD HPLC: 7.1 %
HCT VFR BLD AUTO: 36.5 %
HDLC SERPL-MCNC: 38 MG/DL
HDLC SERPL: 21.7 %
HGB BLD-MCNC: 11.2 G/DL
IMM GRANULOCYTES # BLD AUTO: 0.04 K/UL
IMM GRANULOCYTES NFR BLD AUTO: 0.5 %
LDLC SERPL CALC-MCNC: 112.8 MG/DL
LYMPHOCYTES # BLD AUTO: 2.3 K/UL
LYMPHOCYTES NFR BLD: 28.4 %
MCH RBC QN AUTO: 27.8 PG
MCHC RBC AUTO-ENTMCNC: 30.7 G/DL
MCV RBC AUTO: 91 FL
MONOCYTES # BLD AUTO: 0.8 K/UL
MONOCYTES NFR BLD: 10.2 %
NEUTROPHILS # BLD AUTO: 4.7 K/UL
NEUTROPHILS NFR BLD: 58.4 %
NONHDLC SERPL-MCNC: 137 MG/DL
NRBC BLD-RTO: 0 /100 WBC
PLATELET # BLD AUTO: 263 K/UL
PMV BLD AUTO: 9.9 FL
POTASSIUM SERPL-SCNC: 4.8 MMOL/L
PROT SERPL-MCNC: 6.4 G/DL
RBC # BLD AUTO: 4.03 M/UL
SODIUM SERPL-SCNC: 138 MMOL/L
TRIGL SERPL-MCNC: 121 MG/DL
WBC # BLD AUTO: 8.12 K/UL

## 2018-05-23 PROCEDURE — 83036 HEMOGLOBIN GLYCOSYLATED A1C: CPT

## 2018-05-23 PROCEDURE — 80053 COMPREHEN METABOLIC PANEL: CPT

## 2018-05-23 PROCEDURE — 80061 LIPID PANEL: CPT

## 2018-05-23 PROCEDURE — 85025 COMPLETE CBC W/AUTO DIFF WBC: CPT

## 2018-05-23 PROCEDURE — 36415 COLL VENOUS BLD VENIPUNCTURE: CPT | Mod: PO

## 2018-05-29 ENCOUNTER — OFFICE VISIT (OUTPATIENT)
Dept: FAMILY MEDICINE | Facility: CLINIC | Age: 83
End: 2018-05-29
Payer: MEDICARE

## 2018-05-29 VITALS
RESPIRATION RATE: 18 BRPM | OXYGEN SATURATION: 97 % | BODY MASS INDEX: 24.2 KG/M2 | HEIGHT: 69 IN | DIASTOLIC BLOOD PRESSURE: 70 MMHG | WEIGHT: 163.38 LBS | HEART RATE: 73 BPM | SYSTOLIC BLOOD PRESSURE: 126 MMHG

## 2018-05-29 DIAGNOSIS — E11.9 TYPE 2 DIABETES MELLITUS WITHOUT COMPLICATION, UNSPECIFIED WHETHER LONG TERM INSULIN USE: Primary | ICD-10-CM

## 2018-05-29 DIAGNOSIS — G47.00 INSOMNIA, UNSPECIFIED TYPE: ICD-10-CM

## 2018-05-29 DIAGNOSIS — E78.5 HYPERLIPIDEMIA, UNSPECIFIED HYPERLIPIDEMIA TYPE: ICD-10-CM

## 2018-05-29 DIAGNOSIS — I25.10 CORONARY ARTERY DISEASE INVOLVING NATIVE CORONARY ARTERY WITHOUT ANGINA PECTORIS, UNSPECIFIED WHETHER NATIVE OR TRANSPLANTED HEART: ICD-10-CM

## 2018-05-29 PROCEDURE — 99499 UNLISTED E&M SERVICE: CPT | Mod: S$PBB,,, | Performed by: FAMILY MEDICINE

## 2018-05-29 PROCEDURE — 99999 PR PBB SHADOW E&M-EST. PATIENT-LVL III: CPT | Mod: PBBFAC,,, | Performed by: FAMILY MEDICINE

## 2018-05-29 PROCEDURE — 99214 OFFICE O/P EST MOD 30 MIN: CPT | Mod: S$GLB,,, | Performed by: FAMILY MEDICINE

## 2018-05-29 RX ORDER — ROSUVASTATIN CALCIUM 10 MG/1
10 TABLET, COATED ORAL DAILY
Qty: 90 TABLET | Refills: 3 | Status: SHIPPED | OUTPATIENT
Start: 2018-05-29 | End: 2018-12-13 | Stop reason: SDUPTHER

## 2018-05-29 NOTE — PROGRESS NOTES
Chief Complaint   Patient presents with    Follow-up     HPI:   Diabetes - Takes metformin  BID; Bgs 140 in AM. No hypoglycemia. tolerating Lotensin 5mg  Neuropathy has been persistent.  He is using a topical analgesic 4 times a day which is helping.    HLD - stopped Lipitor 20mg daily 1 month ago due to arthralgias  Insomnia - well- controlled on Restoril 30mg nightly.  CAD s/p NSTEMI - underwent successful PCI of LAD; ICD; taking plavix daily  Taking oral B12    PAST MEDICAL HISTORY:   B12 deficiency   Osteoarthritis  macular degeneration   dyslipidemia   Diabetes with neuropathy  CAD    PAST SURGICAL HISTORY:   Right hip replacement   eye surgery   blood clot in his head, status post celso hole  skin surgeries.     SOCIAL HISTORY: No tobacco. No alcohol. Lives with family.     FAMILY HISTORY: No prostate or colon cancer.     REVIEW OF SYSTEMS:     GENERAL: No fever, chills, fatigability or weight changes.  SKIN/BREASTS: No rashes, sores, itching or changes in color or texture of skin. No changes in moles.   HEAD: No headaches or recent head trauma.   EYES: Visual acuity fine. Denies blurriness, tearing, itching, photophobia, diplopia, or visual changes.   EARS: Denies ear pain, discharge, tinnitus or vertigo. Denies hearing loss.   NOSE: No loss of smell, epistaxis, postnasal drip, discharge, obstruction, or sneezing.  MOUTH & THROAT: No hoarseness, change in voice, swallowing difficulty. No excessive gum bleeding.   HEMATOLOGICAL/NODES: Denies swollen glands. No bleeding or bruising.  CHEST: No ROBERTS, cyanosis, wheezing, cough or sputum production.  CARDIOVASCULAR: Denies chest pain, dyspnea, orthopnea, or palpitations.  GI/ABDOMEN: Appetite fine. No weight loss. Denies nausea, vomiting, diarrhea, constipation, abdominal pain, hematemesis or blood in stool.  URINARY: No dysuria,hematuria, nocturia, incontinence, flank pain, urgency, or urinary difficulty.  PERIPHERAL VASCULAR: No claudication, cold  "intolerance or cyanosis.  Reports numbness to feet and pain, which has been chronic. No worsening, no major improvement since starting Neurontin.  NEUROLOGIC: No history of seizures, paralysis, alteration of gait or coordination.    PSYCH:  No crying spells. Denies anxiety symptoms.    PHYSICAL EXAM:     /70   Pulse 73   Resp 18   Ht 5' 9" (1.753 m)   Wt 74.1 kg (163 lb 5.8 oz)   SpO2 97%   BMI 24.12 kg/m²     GENERAL: He is a well-developed, well-nourished male, who appears in no apparent distress.   HEENT: NC/AT. PERRLA. EOMI. Mucous membranes are moist.   NECK: Supple. No carotid bruits. No thyroid enlargement.   CHEST: Clear to auscultation bilaterally with no crackles, rhonchi or wheezes.   CARDIOVASCULAR: Reveals a regular rate and rhythm. Normal S1 and S2. No murmurs, rubs or gallops are noted.   ABDOMEN: Positive bowel sounds, soft, and nontender. No HSM. No masses.   EXTREMITIES: No edema noted and pulses 2+ bilaterally.     Results for orders placed or performed in visit on 05/23/18   Hemoglobin A1c   Result Value Ref Range    Hemoglobin A1C 7.1 (H) 4.0 - 5.6 %    Estimated Avg Glucose 157 (H) 68 - 131 mg/dL   Comprehensive metabolic panel   Result Value Ref Range    Sodium 138 136 - 145 mmol/L    Potassium 4.8 3.5 - 5.1 mmol/L    Chloride 106 95 - 110 mmol/L    CO2 24 23 - 29 mmol/L    Glucose 132 (H) 70 - 110 mg/dL    BUN, Bld 22 8 - 23 mg/dL    Creatinine 1.3 0.5 - 1.4 mg/dL    Calcium 8.9 8.7 - 10.5 mg/dL    Total Protein 6.4 6.0 - 8.4 g/dL    Albumin 3.4 (L) 3.5 - 5.2 g/dL    Total Bilirubin 0.4 0.1 - 1.0 mg/dL    Alkaline Phosphatase 75 55 - 135 U/L    AST 10 10 - 40 U/L    ALT 8 (L) 10 - 44 U/L    Anion Gap 8 8 - 16 mmol/L    eGFR if African American 56.7 (A) >60 mL/min/1.73 m^2    eGFR if non  49.1 (A) >60 mL/min/1.73 m^2   Lipid panel   Result Value Ref Range    Cholesterol 175 120 - 199 mg/dL    Triglycerides 121 30 - 150 mg/dL    HDL 38 (L) 40 - 75 mg/dL    LDL " Cholesterol 112.8 63.0 - 159.0 mg/dL    HDL/Chol Ratio 21.7 20.0 - 50.0 %    Total Cholesterol/HDL Ratio 4.6 2.0 - 5.0    Non-HDL Cholesterol 137 mg/dL   CBC auto differential   Result Value Ref Range    WBC 8.12 3.90 - 12.70 K/uL    RBC 4.03 (L) 4.60 - 6.20 M/uL    Hemoglobin 11.2 (L) 14.0 - 18.0 g/dL    Hematocrit 36.5 (L) 40.0 - 54.0 %    MCV 91 82 - 98 fL    MCH 27.8 27.0 - 31.0 pg    MCHC 30.7 (L) 32.0 - 36.0 g/dL    RDW 12.6 11.5 - 14.5 %    Platelets 263 150 - 350 K/uL    MPV 9.9 9.2 - 12.9 fL    Immature Granulocytes 0.5 0.0 - 0.5 %    Gran # (ANC) 4.7 1.8 - 7.7 K/uL    Immature Grans (Abs) 0.04 0.00 - 0.04 K/uL    Lymph # 2.3 1.0 - 4.8 K/uL    Mono # 0.8 0.3 - 1.0 K/uL    Eos # 0.2 0.0 - 0.5 K/uL    Baso # 0.03 0.00 - 0.20 K/uL    nRBC 0 0 /100 WBC    Gran% 58.4 38.0 - 73.0 %    Lymph% 28.4 18.0 - 48.0 %    Mono% 10.2 4.0 - 15.0 %    Eosinophil% 2.1 0.0 - 8.0 %    Basophil% 0.4 0.0 - 1.9 %    Differential Method Automated      A/P:  Type 2 diabetes mellitus without complication, unspecified whether long term insulin use  -     Comprehensive metabolic panel; Future; Expected date: 08/27/2018  -     Hemoglobin A1c; Future; Expected date: 08/27/2018  -     Lipid panel; Future; Expected date: 08/27/2018    Insomnia, unspecified type    Hyperlipidemia, unspecified hyperlipidemia type  -     rosuvastatin (CRESTOR) 10 MG tablet; Take 1 tablet (10 mg total) by mouth once daily.  Dispense: 90 tablet; Refill: 3    Coronary artery disease involving native coronary artery without angina pectoris, unspecified whether native or transplanted heart      Diabetes mellitus type ii - controlled  Continue metformin XR 750mg BID    Hld (hyperlipidemia) - controlled  Trial of Crestor 10mg    Insomnia  Continue Restoril 30mg daily    f/u 3 months with labs  Continue oral B12

## 2018-06-13 DIAGNOSIS — Z95.810 CARDIAC DEFIBRILLATOR IN SITU: Primary | ICD-10-CM

## 2018-06-13 DIAGNOSIS — I25.5 ISCHEMIC CARDIOMYOPATHY: ICD-10-CM

## 2018-06-28 ENCOUNTER — TELEPHONE (OUTPATIENT)
Dept: FAMILY MEDICINE | Facility: CLINIC | Age: 83
End: 2018-06-28

## 2018-06-29 ENCOUNTER — TELEPHONE (OUTPATIENT)
Dept: DERMATOLOGY | Facility: CLINIC | Age: 83
End: 2018-06-29

## 2018-06-29 NOTE — TELEPHONE ENCOUNTER
Spoke with Mrs Fernandes. She is going through chemo, and stated this is not a good time to reschedule mohs surgery for her . She stated she will call us back and not to call her.

## 2018-07-05 ENCOUNTER — CLINICAL SUPPORT (OUTPATIENT)
Dept: CARDIOLOGY | Facility: CLINIC | Age: 83
End: 2018-07-05
Attending: INTERNAL MEDICINE
Payer: MEDICARE

## 2018-07-05 ENCOUNTER — OFFICE VISIT (OUTPATIENT)
Dept: CARDIOLOGY | Facility: CLINIC | Age: 83
End: 2018-07-05
Payer: MEDICARE

## 2018-07-05 VITALS
SYSTOLIC BLOOD PRESSURE: 140 MMHG | HEIGHT: 70 IN | HEART RATE: 62 BPM | DIASTOLIC BLOOD PRESSURE: 74 MMHG | WEIGHT: 166.44 LBS | BODY MASS INDEX: 23.83 KG/M2

## 2018-07-05 DIAGNOSIS — E78.5 HYPERLIPIDEMIA, UNSPECIFIED HYPERLIPIDEMIA TYPE: ICD-10-CM

## 2018-07-05 DIAGNOSIS — Z95.810 CARDIAC DEFIBRILLATOR IN SITU: ICD-10-CM

## 2018-07-05 DIAGNOSIS — I25.5 ISCHEMIC CARDIOMYOPATHY: ICD-10-CM

## 2018-07-05 DIAGNOSIS — I25.5 ISCHEMIC CARDIOMYOPATHY: Primary | ICD-10-CM

## 2018-07-05 DIAGNOSIS — I25.10 CORONARY ARTERY DISEASE INVOLVING NATIVE CORONARY ARTERY WITHOUT ANGINA PECTORIS, UNSPECIFIED WHETHER NATIVE OR TRANSPLANTED HEART: ICD-10-CM

## 2018-07-05 PROCEDURE — 99999 PR PBB SHADOW E&M-EST. PATIENT-LVL II: CPT | Mod: PBBFAC,,, | Performed by: INTERNAL MEDICINE

## 2018-07-05 PROCEDURE — 93284 PRGRMG EVAL IMPLANTABLE DFB: CPT | Mod: S$GLB,,, | Performed by: INTERNAL MEDICINE

## 2018-07-05 PROCEDURE — 99214 OFFICE O/P EST MOD 30 MIN: CPT | Mod: S$GLB,,, | Performed by: INTERNAL MEDICINE

## 2018-07-05 NOTE — PROGRESS NOTES
Subjective:    Patient ID:  Dom Guzman is a 87 y.o. male who presents for follow-up of ICM    HPI  He comes for follow up with no major problems, no chest pain, no shortness of breath.  FC II    Review of Systems   Constitution: Negative for decreased appetite, weakness, malaise/fatigue, weight gain and weight loss.   Cardiovascular: Negative for chest pain, dyspnea on exertion, leg swelling, palpitations and syncope.   Respiratory: Negative for cough and shortness of breath.    Gastrointestinal: Negative.    All other systems reviewed and are negative.       Objective:    Physical Exam   Constitutional: He is oriented to person, place, and time. He appears well-developed and well-nourished.   HENT:   Head: Normocephalic.   Eyes: Pupils are equal, round, and reactive to light.   Neck: Normal range of motion. Neck supple. No JVD present. Carotid bruit is not present. No thyromegaly present.   Cardiovascular: Normal rate, regular rhythm, normal heart sounds, intact distal pulses and normal pulses.  PMI is not displaced.  Exam reveals no gallop.    No murmur heard.  Pulmonary/Chest: Effort normal and breath sounds normal.   Abdominal: Soft. Normal appearance. He exhibits no mass. There is no hepatosplenomegaly. There is no tenderness.   Musculoskeletal: Normal range of motion. He exhibits no edema.   Neurological: He is alert and oriented to person, place, and time. He has normal strength and normal reflexes. No sensory deficit.   Skin: Skin is warm and intact.   Psychiatric: He has a normal mood and affect.   Nursing note and vitals reviewed.        Assessment:       1. Ischemic cardiomyopathy    2. Coronary artery disease involving native coronary artery without angina pectoris, unspecified whether native or transplanted heart    3. Hyperlipidemia, unspecified hyperlipidemia type         Plan:     Continue all cardiac medications  Regular exercise program  9 m f/u

## 2018-07-06 LAB
AV DELAY - LONGEST: 160 MSEC
AV DELAY - SHORTEST: 140 MSEC
BATTERY VOLTAGE (V): 2.97 V
CHARGE TIME (SEC): 9.3 SEC
HV IMPEDANCE (OHM): 87 OHM
IMPEDANCE RA LEAD (DONOR): 1055 OHMS
IMPEDANCE RA LEAD (NATIVE): 462 OHMS
IMPEDANCE RA LEAD: 592 OHMS
OHS CV DC PP MS1: 0.4 MS
OHS CV DC PP MS2: 0.4 MS
OHS CV DC PP MS3: 0.4 MS
OHS CV DC PP V1: 1.5 V
OHS CV DC PP V2: NORMAL V
OHS CV DC PP V3: 1.75 V
P/R-WAVE RA LEAD (DONOR): NORMAL MV
P/R-WAVE RA LEAD (NATIVE): NORMAL MV
P/R-WAVE RA LEAD: NORMAL MV
PV DELAY - LONGEST: 140 MSEC
PV DELAY - SHORTEST: 120 MSEC
THRESHOLD MS RA LEAD (DONOR): 0.4 MS
THRESHOLD MS RA LEAD (NATIVE): 0.4 MS
THRESHOLD MS RA LEAD: 0.4 MS
THRESHOLD V RA LEAD (DONOR): 0.9 V
THRESHOLD V RA LEAD (NATIVE): 0.8 V
THRESHOLD V RA LEAD: 0.7 V
VV DELAY: 20 MSEC

## 2018-08-24 ENCOUNTER — LAB VISIT (OUTPATIENT)
Dept: LAB | Facility: HOSPITAL | Age: 83
End: 2018-08-24
Attending: FAMILY MEDICINE
Payer: MEDICARE

## 2018-08-24 DIAGNOSIS — E11.9 TYPE 2 DIABETES MELLITUS WITHOUT COMPLICATION, UNSPECIFIED WHETHER LONG TERM INSULIN USE: ICD-10-CM

## 2018-08-24 LAB
ALBUMIN SERPL BCP-MCNC: 3.4 G/DL
ALP SERPL-CCNC: 65 U/L
ALT SERPL W/O P-5'-P-CCNC: 10 U/L
ANION GAP SERPL CALC-SCNC: 6 MMOL/L
AST SERPL-CCNC: 11 U/L
BILIRUB SERPL-MCNC: 0.6 MG/DL
BUN SERPL-MCNC: 18 MG/DL
CALCIUM SERPL-MCNC: 9.1 MG/DL
CHLORIDE SERPL-SCNC: 106 MMOL/L
CHOLEST SERPL-MCNC: 98 MG/DL
CHOLEST/HDLC SERPL: 2.7 {RATIO}
CO2 SERPL-SCNC: 26 MMOL/L
CREAT SERPL-MCNC: 1.4 MG/DL
EST. GFR  (AFRICAN AMERICAN): 51.9 ML/MIN/1.73 M^2
EST. GFR  (NON AFRICAN AMERICAN): 44.9 ML/MIN/1.73 M^2
ESTIMATED AVG GLUCOSE: 174 MG/DL
GLUCOSE SERPL-MCNC: 141 MG/DL
HBA1C MFR BLD HPLC: 7.7 %
HDLC SERPL-MCNC: 36 MG/DL
HDLC SERPL: 36.7 %
LDLC SERPL CALC-MCNC: 48.2 MG/DL
NONHDLC SERPL-MCNC: 62 MG/DL
POTASSIUM SERPL-SCNC: 4.7 MMOL/L
PROT SERPL-MCNC: 6.4 G/DL
SODIUM SERPL-SCNC: 138 MMOL/L
TRIGL SERPL-MCNC: 69 MG/DL

## 2018-08-24 PROCEDURE — 80053 COMPREHEN METABOLIC PANEL: CPT

## 2018-08-24 PROCEDURE — 80061 LIPID PANEL: CPT

## 2018-08-24 PROCEDURE — 83036 HEMOGLOBIN GLYCOSYLATED A1C: CPT

## 2018-08-24 PROCEDURE — 36415 COLL VENOUS BLD VENIPUNCTURE: CPT | Mod: PO

## 2018-08-29 ENCOUNTER — OFFICE VISIT (OUTPATIENT)
Dept: FAMILY MEDICINE | Facility: CLINIC | Age: 83
End: 2018-08-29
Payer: MEDICARE

## 2018-08-29 VITALS
SYSTOLIC BLOOD PRESSURE: 110 MMHG | WEIGHT: 161.19 LBS | HEIGHT: 70 IN | OXYGEN SATURATION: 95 % | RESPIRATION RATE: 18 BRPM | HEART RATE: 87 BPM | DIASTOLIC BLOOD PRESSURE: 80 MMHG | BODY MASS INDEX: 23.07 KG/M2

## 2018-08-29 DIAGNOSIS — E13.40 NEUROPATHY DUE TO SECONDARY DIABETES: ICD-10-CM

## 2018-08-29 DIAGNOSIS — E78.5 HYPERLIPIDEMIA, UNSPECIFIED HYPERLIPIDEMIA TYPE: ICD-10-CM

## 2018-08-29 DIAGNOSIS — E11.9 TYPE 2 DIABETES MELLITUS WITHOUT COMPLICATION, UNSPECIFIED WHETHER LONG TERM INSULIN USE: Primary | ICD-10-CM

## 2018-08-29 DIAGNOSIS — I25.10 CORONARY ARTERY DISEASE INVOLVING NATIVE CORONARY ARTERY WITHOUT ANGINA PECTORIS, UNSPECIFIED WHETHER NATIVE OR TRANSPLANTED HEART: ICD-10-CM

## 2018-08-29 PROCEDURE — 99214 OFFICE O/P EST MOD 30 MIN: CPT | Mod: S$GLB,,, | Performed by: FAMILY MEDICINE

## 2018-08-29 PROCEDURE — 99999 PR PBB SHADOW E&M-EST. PATIENT-LVL IV: CPT | Mod: PBBFAC,,, | Performed by: FAMILY MEDICINE

## 2018-08-29 RX ORDER — ERYTHROMYCIN 5 MG/G
OINTMENT OPHTHALMIC
Refills: 3 | COMMUNITY
Start: 2018-06-26 | End: 2019-10-25 | Stop reason: SDUPTHER

## 2018-08-29 NOTE — PROGRESS NOTES
Chief Complaint   Patient presents with    Follow-up     HPI:   Diabetes - Takes metformin  BID; Bgs 140 in AM. No hypoglycemia. tolerating Lotensin 5mg  Neuropathy has been persistent.  He is using a topical analgesic 4 times a day which is helping.    HLD - crestor 10mg daily  Insomnia - well- controlled on Restoril 30mg nightly.  CAD s/p NSTEMI - underwent successful PCI of LAD; ICD; taking plavix daily  Taking oral B12    PAST MEDICAL HISTORY:   B12 deficiency   Osteoarthritis  macular degeneration   dyslipidemia   Diabetes with neuropathy  CAD    PAST SURGICAL HISTORY:   Right hip replacement   eye surgery   blood clot in his head, status post celso hole  skin surgeries.     SOCIAL HISTORY: No tobacco. No alcohol. Lives with family.     FAMILY HISTORY: No prostate or colon cancer.     REVIEW OF SYSTEMS:     GENERAL: No fever, chills, fatigability or weight changes.  SKIN/BREASTS: No rashes, sores, itching or changes in color or texture of skin. No changes in moles.   HEAD: No headaches or recent head trauma.   EYES: Visual acuity fine. Denies blurriness, tearing, itching, photophobia, diplopia, or visual changes.   EARS: Denies ear pain, discharge, tinnitus or vertigo. Denies hearing loss.   NOSE: No loss of smell, epistaxis, postnasal drip, discharge, obstruction, or sneezing.  MOUTH & THROAT: No hoarseness, change in voice, swallowing difficulty. No excessive gum bleeding.   HEMATOLOGICAL/NODES: Denies swollen glands. No bleeding or bruising.  CHEST: No ROBERTS, cyanosis, wheezing, cough or sputum production.  CARDIOVASCULAR: Denies chest pain, dyspnea, orthopnea, or palpitations.  GI/ABDOMEN: Appetite fine. No weight loss. Denies nausea, vomiting, diarrhea, constipation, abdominal pain, hematemesis or blood in stool.  URINARY: No dysuria,hematuria, nocturia, incontinence, flank pain, urgency, or urinary difficulty.  PERIPHERAL VASCULAR: No claudication, cold intolerance or cyanosis.  Reports numbness to  "feet and pain, which has been chronic. No worsening, no major improvement since starting Neurontin.  NEUROLOGIC: No history of seizures, paralysis, alteration of gait or coordination.    PSYCH:  No crying spells. Denies anxiety symptoms.    PHYSICAL EXAM:     /80   Pulse 87   Resp 18   Ht 5' 10" (1.778 m)   Wt 73.1 kg (161 lb 2.5 oz)   SpO2 95%   BMI 23.12 kg/m²     GENERAL: He is a well-developed, well-nourished male, who appears in no apparent distress.   HEENT: NC/AT. PERRLA. EOMI. Mucous membranes are moist.   NECK: Supple. No carotid bruits. No thyroid enlargement.   CHEST: Clear to auscultation bilaterally with no crackles, rhonchi or wheezes.   CARDIOVASCULAR: Reveals a regular rate and rhythm. Normal S1 and S2. No murmurs, rubs or gallops are noted.   ABDOMEN: Positive bowel sounds, soft, and nontender. No HSM. No masses.   EXTREMITIES: No edema noted and pulses 2+ bilaterally.     Results for orders placed or performed in visit on 08/24/18   Comprehensive metabolic panel   Result Value Ref Range    Sodium 138 136 - 145 mmol/L    Potassium 4.7 3.5 - 5.1 mmol/L    Chloride 106 95 - 110 mmol/L    CO2 26 23 - 29 mmol/L    Glucose 141 (H) 70 - 110 mg/dL    BUN, Bld 18 8 - 23 mg/dL    Creatinine 1.4 0.5 - 1.4 mg/dL    Calcium 9.1 8.7 - 10.5 mg/dL    Total Protein 6.4 6.0 - 8.4 g/dL    Albumin 3.4 (L) 3.5 - 5.2 g/dL    Total Bilirubin 0.6 0.1 - 1.0 mg/dL    Alkaline Phosphatase 65 55 - 135 U/L    AST 11 10 - 40 U/L    ALT 10 10 - 44 U/L    Anion Gap 6 (L) 8 - 16 mmol/L    eGFR if African American 51.9 (A) >60 mL/min/1.73 m^2    eGFR if non African American 44.9 (A) >60 mL/min/1.73 m^2   Hemoglobin A1c   Result Value Ref Range    Hemoglobin A1C 7.7 (H) 4.0 - 5.6 %    Estimated Avg Glucose 174 (H) 68 - 131 mg/dL   Lipid panel   Result Value Ref Range    Cholesterol 98 (L) 120 - 199 mg/dL    Triglycerides 69 30 - 150 mg/dL    HDL 36 (L) 40 - 75 mg/dL    LDL Cholesterol 48.2 (L) 63.0 - 159.0 mg/dL    " HDL/Chol Ratio 36.7 20.0 - 50.0 %    Total Cholesterol/HDL Ratio 2.7 2.0 - 5.0    Non-HDL Cholesterol 62 mg/dL     A/P:  Type 2 diabetes mellitus without complication, unspecified whether long term insulin use  -     Comprehensive metabolic panel; Future; Expected date: 11/27/2018  -     Hemoglobin A1c; Future; Expected date: 11/27/2018    Hyperlipidemia, unspecified hyperlipidemia type    Coronary artery disease involving native coronary artery without angina pectoris, unspecified whether native or transplanted heart    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

## 2018-09-11 ENCOUNTER — PATIENT MESSAGE (OUTPATIENT)
Dept: ADMINISTRATIVE | Facility: OTHER | Age: 83
End: 2018-09-11

## 2018-09-11 ENCOUNTER — TELEPHONE (OUTPATIENT)
Dept: DERMATOLOGY | Facility: CLINIC | Age: 83
End: 2018-09-11

## 2018-09-11 NOTE — TELEPHONE ENCOUNTER
----- Message from Sandro Alejo MD sent at 9/9/2018  3:09 PM CDT -----  Regarding: Follow-up on patient  Norris,   Ask me Monday about this gentleman, please. We need to followup with him about the skin cancer on his right nose. Please see the copy of your previous message below.  Thanks.  ----- Message -----   From: ST Felix   Sent: 6/29/2018   9:03 AM   To: MD Dr. Sapna Rogers   I called Mrs Fernandes to try and reschedule Mohs surgery for Mr. Fernandes. She is going through chemo, and stated this is not a good time to reschedule mohs surgery for her . She stated she will call us back and not to call her. The original surgery was scheduled for 6/26 and they were a no show.     Thanks Norris

## 2018-09-19 RX ORDER — TEMAZEPAM 30 MG/1
30 CAPSULE ORAL NIGHTLY PRN
Qty: 90 CAPSULE | Refills: 1 | Status: SHIPPED | OUTPATIENT
Start: 2018-09-19 | End: 2018-12-13 | Stop reason: SDUPTHER

## 2018-10-11 ENCOUNTER — TELEPHONE (OUTPATIENT)
Dept: FAMILY MEDICINE | Facility: CLINIC | Age: 83
End: 2018-10-11

## 2018-10-11 NOTE — TELEPHONE ENCOUNTER
Left message with Mrs. Guzman asking that Mr. Guzman contact the office at     Reason: schedule [facility] appt.

## 2018-10-15 ENCOUNTER — CLINICAL SUPPORT (OUTPATIENT)
Dept: CARDIOLOGY | Facility: CLINIC | Age: 83
End: 2018-10-15
Attending: INTERNAL MEDICINE
Payer: MEDICARE

## 2018-10-15 DIAGNOSIS — I25.5 ISCHEMIC CARDIOMYOPATHY: ICD-10-CM

## 2018-10-15 DIAGNOSIS — Z95.810 CARDIAC DEFIBRILLATOR IN SITU: ICD-10-CM

## 2018-10-15 PROCEDURE — 93295 DEV INTERROG REMOTE 1/2/MLT: CPT | Mod: ,,, | Performed by: INTERNAL MEDICINE

## 2018-10-15 PROCEDURE — 93296 REM INTERROG EVL PM/IDS: CPT | Mod: PBBFAC,PO | Performed by: INTERNAL MEDICINE

## 2018-10-22 LAB
AV DELAY - LONGEST: 160 MSEC
AV DELAY - SHORTEST: 140 MSEC
BATTERY VOLTAGE (V): 2.97 V
CHARGE TIME (SEC): 9.4 SEC
HV IMPEDANCE (OHM): 89 OHM
IMPEDANCE RA LEAD (NATIVE): 477 OHMS
IMPEDANCE RA LEAD: 578 OHMS
P/R-WAVE RA LEAD (NATIVE): 7.5 MV
P/R-WAVE RA LEAD: 4.8 MV
PV DELAY - LONGEST: 140 MSEC
PV DELAY - SHORTEST: 120 MSEC
THRESHOLD MS RA LEAD: 0.4 MS
THRESHOLD V RA LEAD: 0.8 V

## 2018-10-23 ENCOUNTER — TELEPHONE (OUTPATIENT)
Dept: FAMILY MEDICINE | Facility: CLINIC | Age: 83
End: 2018-10-23

## 2018-10-23 DIAGNOSIS — E11.9 TYPE 2 DIABETES MELLITUS WITHOUT COMPLICATION, WITHOUT LONG-TERM CURRENT USE OF INSULIN: ICD-10-CM

## 2018-10-23 RX ORDER — METFORMIN HYDROCHLORIDE 750 MG/1
750 TABLET, EXTENDED RELEASE ORAL 2 TIMES DAILY WITH MEALS
Qty: 180 TABLET | Refills: 3 | Status: SHIPPED | OUTPATIENT
Start: 2018-10-23 | End: 2018-12-13 | Stop reason: SDUPTHER

## 2018-10-23 RX ORDER — CLOPIDOGREL BISULFATE 75 MG/1
75 TABLET ORAL DAILY
Qty: 90 TABLET | Refills: 3 | Status: SHIPPED | OUTPATIENT
Start: 2018-10-23 | End: 2018-12-13 | Stop reason: SDUPTHER

## 2018-10-23 NOTE — TELEPHONE ENCOUNTER
----- Message from Sarah Roberts sent at 10/23/2018 12:20 PM CDT -----  Type:  RX Refill Request    Who Called:  Beulah  Refill or New Rx:  refill  RX Name and Strength:     How is the patient currently taking it? (ex. 1XDay):     Is this a 30 day or 90 day RX:  90 day   Preferred Pharmacy with phone number:    [facility] - [place], [place] - [address]  [address] [address]  Phone: [phone] Fax: [phone]       Local or Mail Order:  m/o  Ordering Provider:    Alo Call Back Number:  [phone]    Additional Information:  Pt did not realize he is out

## 2018-11-27 ENCOUNTER — OFFICE VISIT (OUTPATIENT)
Dept: DERMATOLOGY | Facility: CLINIC | Age: 83
End: 2018-11-27
Payer: MEDICARE

## 2018-11-27 DIAGNOSIS — L57.0 ACTINIC KERATOSES: ICD-10-CM

## 2018-11-27 DIAGNOSIS — L82.1 SEBORRHEIC KERATOSES: ICD-10-CM

## 2018-11-27 DIAGNOSIS — Z85.828 HISTORY OF NONMELANOMA SKIN CANCER: ICD-10-CM

## 2018-11-27 DIAGNOSIS — L81.4 SOLAR LENTIGO: ICD-10-CM

## 2018-11-27 DIAGNOSIS — D48.9 NEOPLASM OF UNCERTAIN BEHAVIOR: Primary | ICD-10-CM

## 2018-11-27 DIAGNOSIS — D18.01 CHERRY ANGIOMA: ICD-10-CM

## 2018-11-27 PROCEDURE — 11101 PR BIOPSY, EACH ADDED LESION: CPT | Mod: HCWC,S$GLB,, | Performed by: DERMATOLOGY

## 2018-11-27 PROCEDURE — 17000 DESTRUCT PREMALG LESION: CPT | Mod: HCWC,S$GLB,, | Performed by: DERMATOLOGY

## 2018-11-27 PROCEDURE — 88305 TISSUE EXAM BY PATHOLOGIST: CPT | Mod: HCWC

## 2018-11-27 PROCEDURE — 11100 PR BIOPSY OF SKIN LESION: CPT | Mod: 59,HCWC,S$GLB, | Performed by: DERMATOLOGY

## 2018-11-27 PROCEDURE — 99999 PR PBB SHADOW E&M-EST. PATIENT-LVL III: CPT | Mod: PBBFAC,HCWC,, | Performed by: DERMATOLOGY

## 2018-11-27 PROCEDURE — 88305 TISSUE EXAM BY PATHOLOGIST: CPT | Mod: 26,HCWC,, | Performed by: PATHOLOGY

## 2018-11-27 PROCEDURE — 99213 OFFICE O/P EST LOW 20 MIN: CPT | Mod: 25,HCWC,S$GLB, | Performed by: DERMATOLOGY

## 2018-11-27 PROCEDURE — 1101F PT FALLS ASSESS-DOCD LE1/YR: CPT | Mod: CPTII,HCWC,S$GLB, | Performed by: DERMATOLOGY

## 2018-11-27 PROCEDURE — 17003 DESTRUCT PREMALG LES 2-14: CPT | Mod: HCWC,S$GLB,, | Performed by: DERMATOLOGY

## 2018-11-27 NOTE — PATIENT INSTRUCTIONS
Shave Biopsy Wound Care    Your doctor has performed a shave biopsy today.  A band aid and vaseline ointment has been placed over the site.  This should remain in place for 24 hours.  It is recommended that you keep the area dry for the first 24 hours.  After 24 hours, you may remove the band aid and wash the area with warm soap and water and apply Vaseline jelly.  Many patients prefer to use Neosporin or Bacitracin ointment.  This is acceptable; however, know that you can develop an allergy to this medication even if you have used it safely for years.  It is important to keep the area moist.  Letting it dry out and get air slows healing time, and will worsen the scar.  Band aid is optional after first 24 hours.      If you notice increasing redness, tenderness, pain, or yellow drainage at the biopsy site, please notify your doctor.  These are signs of an infection.    If your biopsy site is bleeding, apply firm pressure for 15 minutes straight.  Repeat for another 15 minutes, if it is still bleeding.   If the surgical site continues to bleed, then please contact your doctor.       [facility]  [facility]  [address]  [place] [address]  Dept: [phone]       CRYOSURGERY      Your doctor has used a method called cryosurgery to treat your skin condition. Cryosurgery refers to the use of very cold substances to treat a variety of skin conditions such as warts, pre-skin cancers, molluscum contagiosum, sun spots, and several benign growths. The substance we use in cryosurgery is liquid nitrogen and is so cold (-195 degrees Celsius) that is burns when administered.     Following treatment in the office, the skin may immediately burn and become red. You may find the area around the lesion is affected as well. It is sometimes necessary to treat not only the lesion, but a small area of the surrounding normal skin to achieve a good response.     A blister, and even a blood filled blister,  may form after treatment.   This is a normal response. If the blister is painful, it is acceptable to sterilize a needle and with rubbing alcohol and gently pop the blister. It is important that you gently wash the area with soap and warm water as the blister fluid may contain wart virus if a wart was treated. Do no remove the roof of the blister.     The area treated can take anywhere from 1-3 weeks to heal. Healing time depends on the kind skin lesion treated, the location, and how aggressively the lesion was treated. It is recommended that the areas treated are covered with Vaseline or bacitracin ointment and a band-aid. If a band-aid is not practical, just ointment applied several times per day will do. Keeping these areas moist will speed the healing time.    Treatment with liquid nitrogen can leave a scar. In dark skin, it may be a light or dark scar, in light skin it may be a white or pink scar. These will generally fade with time, but may never go away completely.     If you have any concerns after your treatment, please feel free to call the office.         [facility]  [facility]  [address] [address]  Dept: [phone]

## 2018-11-27 NOTE — PROGRESS NOTES
Subjective:       Patient ID:  Dom Guzman is a 88 y.o. male who presents for   Chief Complaint   Patient presents with    Spot     behind L ear    Skin Check     UBSE     Patient last seen 11/2017, bx of 2 BCC R cheek and L infraauricular cheek, scheduled for E&S, no clincial lesion remained at the time of procedure, elected to observe  bx of R nasal sidewall lesion 03/2018, nodular BCC, referred to Dr Nolasco for Mohs and seen in consult, did not pursue surgery, patient felt resolved    H/o BCC  07/07/2014 right infra-auricular s/p Mohs  H/o SCC nose s/p Mohs    This is a high risk patient here to check for the development of new lesions.  New complaint today    Requests referral to a [place] provider with blue light ability        Spot  - Initial  Affected locations: behind L ear.  Duration: 2 months  Signs / symptoms: growing, oozing and scaling (raised)  Severity: mild to moderate  Timing: constant  Aggravated by: nothing  Relieving factors/Treatments tried: nothing        Review of Systems   Constitutional: Negative for fever, chills and fatigue.   Skin: Positive for dry skin, activity-related sunscreen use and wears hat. Negative for daily sunscreen use.   Hematologic/Lymphatic: Bruises/bleeds easily.        Objective:    Physical Exam   Constitutional: He appears well-developed and well-nourished. No distress.   Neurological: He is alert and oriented to person, place, and time. He is not disoriented.   Psychiatric: He has a normal mood and affect.   Skin:   Areas Examined (abnormalities noted in diagram):   Scalp / Hair Palpated and Inspected  Head / Face Inspection Performed  Neck Inspection Performed  Chest / Axilla Inspection Performed  Abdomen Inspection Performed  Back Inspection Performed  RUE Inspected  LUE Inspection Performed                       Diagram Legend     Erythematous scaling macule/papule c/w actinic keratosis       Vascular papule c/w angioma      Pigmented verrucoid  papule/plaque c/w seborrheic keratosis      Yellow umbilicated papule c/w sebaceous hyperplasia      Irregularly shaped tan macule c/w lentigo     1-2 mm smooth white papules consistent with Milia      Movable subcutaneous cyst with punctum c/w epidermal inclusion cyst      Subcutaneous movable cyst c/w pilar cyst      Firm pink to brown papule c/w dermatofibroma      Pedunculated fleshy papule(s) c/w skin tag(s)      Evenly pigmented macule c/w junctional nevus     Mildly variegated pigmented, slightly irregular-bordered macule c/w mildly atypical nevus      Flesh colored to evenly pigmented papule c/w intradermal nevus       Pink pearly papule/plaque c/w basal cell carcinoma      Erythematous hyperkeratotic cursted plaque c/w SCC      Surgical scar with no sign of skin cancer recurrence      Open and closed comedones      Inflammatory papules and pustules      Verrucoid papule consistent consistent with wart     Erythematous eczematous patches and plaques     Dystrophic onycholytic nail with subungual debris c/w onychomycosis     Umbilicated papule    Erythematous-base heme-crusted tan verrucoid plaque consistent with inflamed seborrheic keratosis     Erythematous Silvery Scaling Plaque c/w Psoriasis     See annotation          Assessment / Plan:      Pathology Orders:     Normal Orders This Visit    Tissue Specimen To Pathology, Dermatology     Questions:    Directional Terms:  Other(comment)    Clinical information:  both hyperkeratotic papules, r/o SCC    Specific Site:  1/2- left post auricular SUP 2/2- left post auricular INF,        Neoplasm of uncertain behavior  -     Tissue Specimen To Pathology, Dermatology  Shave biopsy procedure note:x2    Shave biopsy performed after verbal consent including risk of infection, scar, recurrence, need for additional treatment of site. Area prepped with alcohol, anesthetized with approximately 1.0cc of 1% lidocaine with epinephrine. Lesional tissue shaved with razor  blade. Hemostasis achieved with application of aluminum chloride - no hyfrecation (patient with DEFIBRILLATOR). No complications. Dressing applied. Wound care explained.    Actinic keratoses  Cryosurgery Procedure Note    Verbal consent from the patient is obtained and the patient is aware of the precancerous quality and need for treatment of these lesions. Liquid nitrogen cryosurgery is applied to the 13 actinic keratoses, as detailed in the physical exam, to produce a freeze injury. The patient is aware that blisters may form and is instructed on wound care with gentle cleansing and use of vaseline ointment to keep moist until healed. The patient is supplied a handout on cryosurgery and is instructed to call if lesions do not completely resolve.    Patient with multiple AKs face, difficulty tolerating cryotherapy  Requests PDT, will place order form [facility] treatment of face    History of nonmelanoma skin cancer  Area of previous NMSC (multiple) examined. Site well healed with no signs of recurrence.  Upper body skin examination performed today including at least 9 points as noted in physical examination.     Seborrheic keratoses  These are benign inherited growths without a malignant potential. Reassurance given to patient. No treatment is necessary.     Solar lentigo  This is a benign hyperpigmented sun induced lesion. Daily sun protection will reduce the number of new lesions. Treatment of these benign lesions are considered cosmetic.    Cherry angioma  This is a benign vascular lesion. Reassurance given. No treatment required.     Patient instructed in importance in daily sun protection of at least spf 30. Mineral sunscreen ingredients preferred (Zinc +/- Titanium).   Recommend Elta MD for daily use on face and neck.  Patient encouraged to wear hat for all outdoor exposure.   Also discussed sun avoidance and use of protective clothing.           Follow-up in about 6 months (around 5/27/2019).

## 2018-11-29 ENCOUNTER — TELEPHONE (OUTPATIENT)
Dept: FAMILY MEDICINE | Facility: CLINIC | Age: 83
End: 2018-11-29

## 2018-11-29 ENCOUNTER — LAB VISIT (OUTPATIENT)
Dept: LAB | Facility: HOSPITAL | Age: 83
End: 2018-11-29
Attending: FAMILY MEDICINE
Payer: MEDICARE

## 2018-11-29 DIAGNOSIS — E11.9 TYPE 2 DIABETES MELLITUS WITHOUT COMPLICATION, UNSPECIFIED WHETHER LONG TERM INSULIN USE: ICD-10-CM

## 2018-11-29 LAB
ALBUMIN SERPL BCP-MCNC: 3.4 G/DL
ALP SERPL-CCNC: 64 U/L
ALT SERPL W/O P-5'-P-CCNC: 7 U/L
ANION GAP SERPL CALC-SCNC: 8 MMOL/L
AST SERPL-CCNC: 12 U/L
BILIRUB SERPL-MCNC: 0.5 MG/DL
BUN SERPL-MCNC: 21 MG/DL
CALCIUM SERPL-MCNC: 8.9 MG/DL
CHLORIDE SERPL-SCNC: 105 MMOL/L
CO2 SERPL-SCNC: 24 MMOL/L
CREAT SERPL-MCNC: 1.4 MG/DL
EST. GFR  (AFRICAN AMERICAN): 51.5 ML/MIN/1.73 M^2
EST. GFR  (NON AFRICAN AMERICAN): 44.5 ML/MIN/1.73 M^2
ESTIMATED AVG GLUCOSE: 163 MG/DL
GLUCOSE SERPL-MCNC: 155 MG/DL
HBA1C MFR BLD HPLC: 7.3 %
POTASSIUM SERPL-SCNC: 4.5 MMOL/L
PROT SERPL-MCNC: 6.6 G/DL
SODIUM SERPL-SCNC: 137 MMOL/L

## 2018-11-29 PROCEDURE — 83036 HEMOGLOBIN GLYCOSYLATED A1C: CPT | Mod: HCWC

## 2018-11-29 PROCEDURE — 36415 COLL VENOUS BLD VENIPUNCTURE: CPT | Mod: HCWC,PO

## 2018-11-29 PROCEDURE — 80053 COMPREHEN METABOLIC PANEL: CPT | Mod: HCWC

## 2018-11-29 NOTE — TELEPHONE ENCOUNTER
----- Message from Mary Jean sent at 11/29/2018  4:39 PM CST -----  Contact: Beulah  Type:  Patient Returning Call    Who Called:  Beulah, wife  Who Left Message for Patient:  Doesn't know  Does the patient know what this is regarding?:  Trying to get her & her  in for a reschedule per  But doesn't want to wait until 12/26  Best Call Back Number:  [phone]  Additional Information:  Patient's wife needs to reschedule appts--doesn't want to wait to 12/26--wants to come in sooner--please advise--thank you

## 2018-12-03 ENCOUNTER — TELEPHONE (OUTPATIENT)
Dept: FAMILY MEDICINE | Facility: CLINIC | Age: 83
End: 2018-12-03

## 2018-12-03 NOTE — TELEPHONE ENCOUNTER
----- Message from Stephanie Kate sent at 11/30/2018 11:26 AM CST -----  Contact: [facility]  [facility] calling checking on the status of the clearance that they had faxed over to the office for the pt to get some dental work and not heard anything from...[phone]

## 2018-12-10 ENCOUNTER — TELEPHONE (OUTPATIENT)
Dept: DERMATOLOGY | Facility: CLINIC | Age: 83
End: 2018-12-10

## 2018-12-13 ENCOUNTER — OFFICE VISIT (OUTPATIENT)
Dept: FAMILY MEDICINE | Facility: CLINIC | Age: 83
End: 2018-12-13
Payer: MEDICARE

## 2018-12-13 VITALS
RESPIRATION RATE: 18 BRPM | SYSTOLIC BLOOD PRESSURE: 126 MMHG | HEART RATE: 71 BPM | DIASTOLIC BLOOD PRESSURE: 78 MMHG | HEIGHT: 70 IN | OXYGEN SATURATION: 95 % | WEIGHT: 164.88 LBS | BODY MASS INDEX: 23.6 KG/M2

## 2018-12-13 DIAGNOSIS — G47.00 INSOMNIA, UNSPECIFIED TYPE: ICD-10-CM

## 2018-12-13 DIAGNOSIS — E11.9 TYPE 2 DIABETES MELLITUS WITHOUT COMPLICATION, WITHOUT LONG-TERM CURRENT USE OF INSULIN: Primary | ICD-10-CM

## 2018-12-13 DIAGNOSIS — E78.5 HYPERLIPIDEMIA, UNSPECIFIED HYPERLIPIDEMIA TYPE: ICD-10-CM

## 2018-12-13 DIAGNOSIS — I25.10 CORONARY ARTERY DISEASE INVOLVING NATIVE CORONARY ARTERY WITHOUT ANGINA PECTORIS, UNSPECIFIED WHETHER NATIVE OR TRANSPLANTED HEART: ICD-10-CM

## 2018-12-13 PROCEDURE — 99214 OFFICE O/P EST MOD 30 MIN: CPT | Mod: 25,S$GLB,, | Performed by: FAMILY MEDICINE

## 2018-12-13 PROCEDURE — G0008 ADMIN INFLUENZA VIRUS VAC: HCPCS | Mod: S$GLB,,, | Performed by: FAMILY MEDICINE

## 2018-12-13 PROCEDURE — 90662 IIV NO PRSV INCREASED AG IM: CPT | Mod: S$GLB,,, | Performed by: FAMILY MEDICINE

## 2018-12-13 PROCEDURE — 1101F PT FALLS ASSESS-DOCD LE1/YR: CPT | Mod: CPTII,S$GLB,, | Performed by: FAMILY MEDICINE

## 2018-12-13 PROCEDURE — 99499 UNLISTED E&M SERVICE: CPT | Mod: HCNC,S$GLB,, | Performed by: FAMILY MEDICINE

## 2018-12-13 PROCEDURE — 99999 PR PBB SHADOW E&M-EST. PATIENT-LVL III: CPT | Mod: PBBFAC,HCWC,, | Performed by: FAMILY MEDICINE

## 2018-12-13 RX ORDER — CLOPIDOGREL BISULFATE 75 MG/1
75 TABLET ORAL DAILY
Qty: 90 TABLET | Refills: 3 | Status: SHIPPED | OUTPATIENT
Start: 2018-12-13 | End: 2019-12-17 | Stop reason: SDUPTHER

## 2018-12-13 RX ORDER — METFORMIN HYDROCHLORIDE 750 MG/1
750 TABLET, EXTENDED RELEASE ORAL 2 TIMES DAILY WITH MEALS
Qty: 180 TABLET | Refills: 3 | Status: SHIPPED | OUTPATIENT
Start: 2018-12-13 | End: 2020-11-25 | Stop reason: SDUPTHER

## 2018-12-13 RX ORDER — ROSUVASTATIN CALCIUM 10 MG/1
10 TABLET, COATED ORAL DAILY
Qty: 90 TABLET | Refills: 3 | Status: SHIPPED | OUTPATIENT
Start: 2018-12-13 | End: 2019-12-17 | Stop reason: SDUPTHER

## 2018-12-13 RX ORDER — BENAZEPRIL HYDROCHLORIDE 5 MG/1
5 TABLET ORAL DAILY
Qty: 90 TABLET | Refills: 3 | Status: SHIPPED | OUTPATIENT
Start: 2018-12-13 | End: 2019-12-17 | Stop reason: SDUPTHER

## 2018-12-13 RX ORDER — TEMAZEPAM 30 MG/1
30 CAPSULE ORAL NIGHTLY PRN
Qty: 90 CAPSULE | Refills: 1 | Status: SHIPPED | OUTPATIENT
Start: 2018-12-13 | End: 2019-11-14 | Stop reason: SDUPTHER

## 2018-12-13 NOTE — PROGRESS NOTES
Chief Complaint   Patient presents with    Follow-up     flu shot     HPI:   Diabetes - Takes metformin  BID; Bgs 140 in AM. No hypoglycemia. tolerating Lotensin 5mg  Neuropathy has been persistent.  He is using a topical analgesic 4 times a day which is helping.    HLD - crestor 10mg daily  Insomnia - well- controlled on Restoril 30mg nightly.  CAD s/p NSTEMI - underwent successful PCI of LAD; ICD; taking plavix daily  Taking oral B12    PAST MEDICAL HISTORY:   B12 deficiency   Osteoarthritis  macular degeneration   dyslipidemia   Diabetes with neuropathy  CAD    PAST SURGICAL HISTORY:   Right hip replacement   eye surgery   blood clot in his head, status post celso hole  skin surgeries.     SOCIAL HISTORY: No tobacco. No alcohol. Lives with family.     FAMILY HISTORY: No prostate or colon cancer.     REVIEW OF SYSTEMS:     GENERAL: No fever, chills, fatigability or weight changes.  SKIN/BREASTS: No rashes, sores, itching or changes in color or texture of skin. No changes in moles.   HEAD: No headaches or recent head trauma.   EYES: Visual acuity fine. Denies blurriness, tearing, itching, photophobia, diplopia, or visual changes.   EARS: Denies ear pain, discharge, tinnitus or vertigo. Denies hearing loss.   NOSE: No loss of smell, epistaxis, postnasal drip, discharge, obstruction, or sneezing.  MOUTH & THROAT: No hoarseness, change in voice, swallowing difficulty. No excessive gum bleeding.   HEMATOLOGICAL/NODES: Denies swollen glands. No bleeding or bruising.  CHEST: No ROBERTS, cyanosis, wheezing, cough or sputum production.  CARDIOVASCULAR: Denies chest pain, dyspnea, orthopnea, or palpitations.  GI/ABDOMEN: Appetite fine. No weight loss. Denies nausea, vomiting, diarrhea, constipation, abdominal pain, hematemesis or blood in stool.  URINARY: No dysuria,hematuria, nocturia, incontinence, flank pain, urgency, or urinary difficulty.  PERIPHERAL VASCULAR: No claudication, cold intolerance or cyanosis.  Reports  "numbness to feet and pain, which has been chronic. No worsening, no major improvement since starting Neurontin.  NEUROLOGIC: No history of seizures, paralysis, alteration of gait or coordination.    PSYCH:  No crying spells. Denies anxiety symptoms.    PHYSICAL EXAM:     /78   Pulse 71   Resp 18   Ht 5' 10" (1.778 m)   Wt 74.8 kg (164 lb 14.5 oz)   SpO2 95%   BMI 23.66 kg/m²     GENERAL: He is a well-developed, well-nourished male, who appears in no apparent distress.   HEENT: NC/AT. PERRLA. EOMI. Mucous membranes are moist.   NECK: Supple. No carotid bruits. No thyroid enlargement.   CHEST: Clear to auscultation bilaterally with no crackles, rhonchi or wheezes.   CARDIOVASCULAR: Reveals a regular rate and rhythm. Normal S1 and S2. No murmurs, rubs or gallops are noted.   ABDOMEN: Positive bowel sounds, soft, and nontender. No HSM. No masses.   EXTREMITIES: No edema noted and pulses 2+ bilaterally.     Results for orders placed or performed in visit on 11/29/18   Comprehensive metabolic panel   Result Value Ref Range    Sodium 137 136 - 145 mmol/L    Potassium 4.5 3.5 - 5.1 mmol/L    Chloride 105 95 - 110 mmol/L    CO2 24 23 - 29 mmol/L    Glucose 155 (H) 70 - 110 mg/dL    BUN, Bld 21 8 - 23 mg/dL    Creatinine 1.4 0.5 - 1.4 mg/dL    Calcium 8.9 8.7 - 10.5 mg/dL    Total Protein 6.6 6.0 - 8.4 g/dL    Albumin 3.4 (L) 3.5 - 5.2 g/dL    Total Bilirubin 0.5 0.1 - 1.0 mg/dL    Alkaline Phosphatase 64 55 - 135 U/L    AST 12 10 - 40 U/L    ALT 7 (L) 10 - 44 U/L    Anion Gap 8 8 - 16 mmol/L    eGFR if African American 51.5 (A) >60 mL/min/1.73 m^2    eGFR if non African American 44.5 (A) >60 mL/min/1.73 m^2   Hemoglobin A1c   Result Value Ref Range    Hemoglobin A1C 7.3 (H) 4.0 - 5.6 %    Estimated Avg Glucose 163 (H) 68 - 131 mg/dL     A/P:  Type 2 diabetes mellitus without complication, without long-term current use of insulin  -     metFORMIN (GLUCOPHAGE-XR) 750 MG 24 hr tablet; Take 1 tablet (750 mg total) by " mouth 2 (two) times daily with meals.  Dispense: 180 tablet; Refill: 3  -     Hemoglobin A1c; Future; Expected date: 06/11/2019  -     Comprehensive metabolic panel; Future; Expected date: 06/11/2019  -     Lipid panel; Future; Expected date: 06/11/2019    Hyperlipidemia, unspecified hyperlipidemia type  -     rosuvastatin (CRESTOR) 10 MG tablet; Take 1 tablet (10 mg total) by mouth once daily.  Dispense: 90 tablet; Refill: 3    Insomnia, unspecified type    Coronary artery disease involving native coronary artery without angina pectoris, unspecified whether native or transplanted heart    Other orders  -     clopidogrel (PLAVIX) 75 mg tablet; Take 1 tablet (75 mg total) by mouth once daily.  Dispense: 90 tablet; Refill: 3  -     benazepril (LOTENSIN) 5 MG tablet; Take 1 tablet (5 mg total) by mouth once daily.  Dispense: 90 tablet; Refill: 3  -     temazepam (RESTORIL) 30 mg capsule; Take 1 capsule (30 mg total) by mouth nightly as needed.  Dispense: 90 capsule; Refill: 1  -     Influenza - High Dose (65+) (PF) (IM)      Diabetes mellitus type ii - controlled  Continue metformin XR 750mg BID    Hld (hyperlipidemia) - controlled  Crestor 10mg    Insomnia  Continue Restoril 30mg daily    f/u 3 months with labs  Continue oral B12

## 2018-12-13 NOTE — PROGRESS NOTES
Patient, Dom Guzman (MRN #[id number]), presented with a recent Estimated Glumerular Filtration Rate (EGFR) between 30 and 245 consistent with the definition of chronic kidney disease stage 4 (ICD10 - N18.3).    eGFR if non    Date Value Ref Range Status   11/29/2018 44.5 (A) >60 mL/min/1.73 m^2 Final     Comment:     Calculation used to obtain the estimated glomerular filtration  rate (eGFR) is the CKD-EPI equation.          The patient's chronic kidney disease stage 4 was monitored, evaluated, addressed and/or treated. This addendum to the medical record is made on 12/13/2018.

## 2018-12-27 ENCOUNTER — TELEPHONE (OUTPATIENT)
Dept: DERMATOLOGY | Facility: CLINIC | Age: 83
End: 2018-12-27

## 2018-12-27 NOTE — TELEPHONE ENCOUNTER
----- Message from Maricarmen Giovananereida sent at 12/27/2018 10:40 AM CST -----  Contact: Wife Beulah  Patient has another Dr's appt in the city on 1/4 along with his appt with Dr Leal.  Requesting you get his appt rescheduled with Dr Leal as soon as possible after 1/4.  Call back Beulah at [phone].  Thank you!

## 2018-12-31 ENCOUNTER — TELEPHONE (OUTPATIENT)
Dept: FAMILY MEDICINE | Facility: CLINIC | Age: 83
End: 2018-12-31

## 2018-12-31 NOTE — TELEPHONE ENCOUNTER
Pt having surgery on his left  eye , Ph # [phone] Dr Regalado  not scheduled yet. Pt requesting refill Erythromycin ointment . Spoke w/ Faith at Dr Regalado [facility] , they will call pt to discuss.--lp

## 2018-12-31 NOTE — TELEPHONE ENCOUNTER
----- Message from Sarah Roberts sent at 12/28/2018  3:21 PM CST -----  Pt's wife / Beulah [phone]  / requesting a prescription.. Can't get the Dr Regalado / for today / please call in  erythromycin (ROMYCIN) ophthalmic ointment / having surgery on his eye        [facility] [place] [id number] [address]  [address] [address]  Phone: [phone] Fax: [phone]

## 2019-01-03 ENCOUNTER — CLINICAL SUPPORT (OUTPATIENT)
Dept: DERMATOLOGY | Facility: CLINIC | Age: 84
End: 2019-01-03
Payer: MEDICARE

## 2019-01-03 DIAGNOSIS — L57.0 ACTINIC KERATOSES: ICD-10-CM

## 2019-01-03 PROCEDURE — 99499 NO LOS: ICD-10-PCS | Mod: S$GLB,,, | Performed by: DERMATOLOGY

## 2019-01-03 PROCEDURE — 96567 PDT DSTR PRMLG LES SKN: CPT | Mod: S$GLB,,, | Performed by: DERMATOLOGY

## 2019-01-03 PROCEDURE — 96567 PR EXT PHOTODYNAMIC THERAPY: ICD-10-PCS | Mod: S$GLB,,, | Performed by: DERMATOLOGY

## 2019-01-03 PROCEDURE — 99999 PR PBB SHADOW E&M-EST. PATIENT-LVL III: CPT | Mod: PBBFAC,,,

## 2019-01-03 PROCEDURE — 99499 UNLISTED E&M SERVICE: CPT | Mod: S$GLB,,, | Performed by: DERMATOLOGY

## 2019-01-03 PROCEDURE — 99999 PR PBB SHADOW E&M-EST. PATIENT-LVL III: ICD-10-PCS | Mod: PBBFAC,,,

## 2019-01-03 NOTE — PROGRESS NOTES
Photodynamic Therapy Note.    PDT ordered per Dr. Leal    Patient here today for treatment of actinic keratoses using photodynamic therapy.  Risks including but not limited to burning, stinging, redness, swelling, crusting or blistering of the skin of the area treated were discussed with patient.  Patient elects to proceed with photodynamic therapy.    Treatment area:  Face & upper hairline  Treatment area cleaned with rubbing alcohol, 1 Levulan Kerastick (NDC: 86283-389-94) applied evenly to entire surface and allowed to absorb for 60 minutes.  Patient then placed under Mat-U light for 16 minutes 40 seconds.    Patient tolerated treatment well with only mild but tolerable symptoms of discomfort.  Area washed gently with mild soap and water; Zinc oxide sunscreen applied.  Patient advised to avoid any significant light exposure (sun and artificial) for next 48 hours.    RTC:  In 1 month or sooner if any problems arise.

## 2019-01-11 ENCOUNTER — TELEPHONE (OUTPATIENT)
Dept: DERMATOLOGY | Facility: CLINIC | Age: 84
End: 2019-01-11

## 2019-01-11 ENCOUNTER — OFFICE VISIT (OUTPATIENT)
Dept: DERMATOLOGY | Facility: CLINIC | Age: 84
End: 2019-01-11
Payer: MEDICARE

## 2019-01-11 DIAGNOSIS — L57.0 ACTINIC KERATOSES: ICD-10-CM

## 2019-01-11 DIAGNOSIS — C44.42 SQUAMOUS CELL CARCINOMA OF SKIN OF SCALP AND NECK: Primary | ICD-10-CM

## 2019-01-11 PROCEDURE — 99212 OFFICE O/P EST SF 10 MIN: CPT | Mod: 25,S$GLB,, | Performed by: DERMATOLOGY

## 2019-01-11 PROCEDURE — 99212 PR OFFICE/OUTPT VISIT, EST, LEVL II, 10-19 MIN: ICD-10-PCS | Mod: 25,S$GLB,, | Performed by: DERMATOLOGY

## 2019-01-11 PROCEDURE — 99999 PR PBB SHADOW E&M-EST. PATIENT-LVL III: ICD-10-PCS | Mod: PBBFAC,,, | Performed by: DERMATOLOGY

## 2019-01-11 PROCEDURE — 1101F PR PT FALLS ASSESS DOC 0-1 FALLS W/OUT INJ PAST YR: ICD-10-PCS | Mod: CPTII,S$GLB,, | Performed by: DERMATOLOGY

## 2019-01-11 PROCEDURE — 1101F PT FALLS ASSESS-DOCD LE1/YR: CPT | Mod: CPTII,S$GLB,, | Performed by: DERMATOLOGY

## 2019-01-11 PROCEDURE — 17000 PR DESTRUCTION(LASER SURGERY,CRYOSURGERY,CHEMOSURGERY),PREMALIGNANT LESIONS,FIRST LESION: ICD-10-PCS | Mod: S$GLB,,, | Performed by: DERMATOLOGY

## 2019-01-11 PROCEDURE — 99999 PR PBB SHADOW E&M-EST. PATIENT-LVL III: CPT | Mod: PBBFAC,,, | Performed by: DERMATOLOGY

## 2019-01-11 PROCEDURE — 17000 DESTRUCT PREMALG LESION: CPT | Mod: S$GLB,,, | Performed by: DERMATOLOGY

## 2019-01-11 NOTE — PROGRESS NOTES
Subjective:       Patient ID:  Dom Guzman is a 88 y.o. male who presents for   Chief Complaint   Patient presents with    Follow-up     reevaluate 2 bx sites      Patient last seen 11/2018, bx of 2 lesions, here for reevaluation    FINAL PATHOLOGIC DIAGNOSIS  1. Skin, left superior postauricular, shave biopsy:  - SQUAMOUS CELL CARCINOMA WELL-DIFFERENTIATED, ACANTHOLYTIC TYPE.  - THE LATERAL AND THE DEEP BIOPSY EDGES ARE INVOLVED.  2. Skin, left inferior postauricular, shave biopsy:  - SQUAMOUS CELL CARCINOMA WELL-DIFFERENTIATED, ACANTHOLYTIC TYPE.  - THE LATERAL AND THE DEEP BIOPSY EDGES ARE INVOLVED    H/o 2 BCC R cheek and L infraauricular cheek, 2017 scheduled for E&S, no clincial lesion remained at the time of procedure, elected to observe  H/o of R nasal sidewall lesion 03/2018, nodular BCC, referred to Dr Nolasco for Mohs and seen in consult, did not pursue surgery, patient felt resolved  H/o BCC  07/07/2014 right infra-auricular s/p Mohs  H/o SCC nose s/p Mohs    This is a high risk patient here to check for the development of new lesions.    S/p PDT face/uupper hairline 01/2019        Review of Systems   Constitutional: Negative for fever, chills and fatigue.   Skin: Positive for activity-related sunscreen use and wears hat. Negative for daily sunscreen use.   Hematologic/Lymphatic: Bruises/bleeds easily.        Objective:    Physical Exam   Constitutional: He appears well-developed and well-nourished. No distress.   Neurological: He is alert and oriented to person, place, and time. He is not disoriented.   Psychiatric: He has a normal mood and affect.   Skin:   Areas Examined (abnormalities noted in diagram):   Scalp / Hair Palpated and Inspected  Head / Face Inspection Performed  Neck Inspection Performed  Chest / Axilla Inspection Performed  Abdomen Inspection Performed  Back Inspection Performed  RUE Inspected  LUE Inspection Performed                       Diagram Legend     Erythematous  scaling macule/papule c/w actinic keratosis       Vascular papule c/w angioma      Pigmented verrucoid papule/plaque c/w seborrheic keratosis      Yellow umbilicated papule c/w sebaceous hyperplasia      Irregularly shaped tan macule c/w lentigo     1-2 mm smooth white papules consistent with Milia      Movable subcutaneous cyst with punctum c/w epidermal inclusion cyst      Subcutaneous movable cyst c/w pilar cyst      Firm pink to brown papule c/w dermatofibroma      Pedunculated fleshy papule(s) c/w skin tag(s)      Evenly pigmented macule c/w junctional nevus     Mildly variegated pigmented, slightly irregular-bordered macule c/w mildly atypical nevus      Flesh colored to evenly pigmented papule c/w intradermal nevus       Pink pearly papule/plaque c/w basal cell carcinoma      Erythematous hyperkeratotic cursted plaque c/w SCC      Surgical scar with no sign of skin cancer recurrence      Open and closed comedones      Inflammatory papules and pustules      Verrucoid papule consistent consistent with wart     Erythematous eczematous patches and plaques     Dystrophic onycholytic nail with subungual debris c/w onychomycosis     Umbilicated papule    Erythematous-base heme-crusted tan verrucoid plaque consistent with inflamed seborrheic keratosis     Erythematous Silvery Scaling Plaque c/w Psoriasis     See annotation          Assessment / Plan:        Squamous cell carcinoma of skin of scalp and neck  -     Ambulatory referral to Dermatology  discussed E&S vs Mohs  Patient elects to proceed with Dr Alejo    Actinic keratoses  Cryosurgery procedure note:    Verbal consent from the patient is obtained and the patient is aware of the precancerous quality and need for treatment of these lesions. Liquid nitrogen cryosurgery is applied to the 1 actinic keratoses, as detailed in the physical exam, to produce a freeze injury. The patient is aware that blisters may form and is instructed on wound care with gentle  cleansing and use of vaseline ointment to keep moist until healed. The patient is supplied a handout on cryosurgery and is instructed to call if lesions do not completely resolve.    recent PDT to face, some residual erythema and desquamation. Vaseline QHS         No Follow-up on file.

## 2019-01-11 NOTE — PATIENT INSTRUCTIONS

## 2019-01-11 NOTE — TELEPHONE ENCOUNTER
----- Message from Shama Cardenas LPN sent at 1/11/2019  2:09 PM CST -----  Hi,  Dr Elvis Little seen this pt in clinic today to re-evaluate his biopsy site(left postauricular). She cc Dr Alejo on the chart. She is referring him for MOHS.    Thanks,  Shama

## 2019-01-14 ENCOUNTER — TELEPHONE (OUTPATIENT)
Dept: DERMATOLOGY | Facility: CLINIC | Age: 84
End: 2019-01-14

## 2019-01-14 NOTE — TELEPHONE ENCOUNTER
Patient's wife would like call from Dr. Luis to discuss options again.  Still unsure although had [facility]  Wants to discuss other options

## 2019-01-14 NOTE — TELEPHONE ENCOUNTER
----- Message from Jesse Mendez sent at 1/14/2019  1:33 PM CST -----  Contact: pt wife Beulah Riojas wants to know if the appointment with Dr Alejo is needed then or if something else can be done.    Call Back #: [phone]  Thanks

## 2019-01-15 NOTE — TELEPHONE ENCOUNTER
Spoke with patient wife Ms Riojas. Their concern is MOhs consult in 2 months.  Messaged Dr Alejo for further input, will call patient back whe nmore info available.

## 2019-01-21 ENCOUNTER — CLINICAL SUPPORT (OUTPATIENT)
Dept: CARDIOLOGY | Facility: CLINIC | Age: 84
End: 2019-01-21
Attending: INTERNAL MEDICINE
Payer: MEDICARE

## 2019-01-21 DIAGNOSIS — I25.5 ISCHEMIC CARDIOMYOPATHY: ICD-10-CM

## 2019-01-21 DIAGNOSIS — Z95.810 CARDIAC DEFIBRILLATOR IN SITU: ICD-10-CM

## 2019-01-21 PROCEDURE — 93295 DEV INTERROG REMOTE 1/2/MLT: CPT | Mod: HCNC,S$GLB,, | Performed by: INTERNAL MEDICINE

## 2019-01-21 PROCEDURE — 93296 REM INTERROG EVL PM/IDS: CPT | Mod: HCNC,S$GLB,, | Performed by: INTERNAL MEDICINE

## 2019-01-21 PROCEDURE — 93295 CARDIAC DEVICE CHECK CHECK - REMOTE: ICD-10-PCS | Mod: HCNC,S$GLB,, | Performed by: INTERNAL MEDICINE

## 2019-01-21 PROCEDURE — 93296 CARDIAC DEVICE CHECK CHECK - REMOTE: ICD-10-PCS | Mod: HCNC,S$GLB,, | Performed by: INTERNAL MEDICINE

## 2019-01-22 ENCOUNTER — TELEPHONE (OUTPATIENT)
Dept: DERMATOLOGY | Facility: CLINIC | Age: 84
End: 2019-01-22

## 2019-01-22 NOTE — TELEPHONE ENCOUNTER
Called patient and spoke with his wife I gave her two dates, 1-29-19 for consult and 2-5-19 for surgery. The patient was glad I got him in sooner. Hawa

## 2019-01-22 NOTE — TELEPHONE ENCOUNTER
----- Message from Kusum Aragon sent at 1/22/2019 12:13 PM CST -----  Contact: Patient's wife, Beulah  Patient's wife is calling to speak with someone regarding patient's upcoming appointment with Dr. Alejo.  Please call to discuss.  Call Back#[phone]  Thanks

## 2019-01-29 ENCOUNTER — INITIAL CONSULT (OUTPATIENT)
Dept: DERMATOLOGY | Facility: CLINIC | Age: 84
End: 2019-01-29
Payer: MEDICARE

## 2019-01-29 VITALS
HEART RATE: 69 BPM | WEIGHT: 160 LBS | DIASTOLIC BLOOD PRESSURE: 74 MMHG | BODY MASS INDEX: 23.7 KG/M2 | HEIGHT: 69 IN | SYSTOLIC BLOOD PRESSURE: 146 MMHG

## 2019-01-29 DIAGNOSIS — C44.42 SQUAMOUS CELL CARCINOMA OF NECK: Primary | ICD-10-CM

## 2019-01-29 LAB
AV DELAY - SHORTEST: 140 MSEC
BATTERY VOLTAGE (V): 2.97 V
CHARGE TIME (SEC): 9.3 SEC
HV IMPEDANCE (OHM): 86 OHM
IMPEDANCE RA LEAD (NATIVE): 462 OHMS
IMPEDANCE RA LEAD: 563 OHMS
P/R-WAVE RA LEAD (NATIVE): 6.5 MV
P/R-WAVE RA LEAD: 3.9 MV
PV DELAY - FIXED: -20 MSEC
THRESHOLD MS RA LEAD: 0.4 MS
THRESHOLD V RA LEAD: 0.9 V
VV DELAY: 20 MSEC

## 2019-01-29 PROCEDURE — 99214 OFFICE O/P EST MOD 30 MIN: CPT | Mod: HCNC,S$GLB,, | Performed by: DERMATOLOGY

## 2019-01-29 PROCEDURE — 99499 UNLISTED E&M SERVICE: CPT | Mod: HCNC,S$GLB,, | Performed by: DERMATOLOGY

## 2019-01-29 PROCEDURE — 99999 PR PBB SHADOW E&M-EST. PATIENT-LVL III: ICD-10-PCS | Mod: PBBFAC,HCNC,, | Performed by: DERMATOLOGY

## 2019-01-29 PROCEDURE — 1101F PR PT FALLS ASSESS DOC 0-1 FALLS W/OUT INJ PAST YR: ICD-10-PCS | Mod: HCNC,CPTII,S$GLB, | Performed by: DERMATOLOGY

## 2019-01-29 PROCEDURE — 99499 RISK ADDL DX/OHS AUDIT: ICD-10-PCS | Mod: HCNC,S$GLB,, | Performed by: DERMATOLOGY

## 2019-01-29 PROCEDURE — 1101F PT FALLS ASSESS-DOCD LE1/YR: CPT | Mod: HCNC,CPTII,S$GLB, | Performed by: DERMATOLOGY

## 2019-01-29 PROCEDURE — 99214 PR OFFICE/OUTPT VISIT, EST, LEVL IV, 30-39 MIN: ICD-10-PCS | Mod: HCNC,S$GLB,, | Performed by: DERMATOLOGY

## 2019-01-29 PROCEDURE — 99999 PR PBB SHADOW E&M-EST. PATIENT-LVL III: CPT | Mod: PBBFAC,HCNC,, | Performed by: DERMATOLOGY

## 2019-01-29 NOTE — PROGRESS NOTES
+++ HAS A DEFIBRILLATOR +++  ALLERGIES:  Carvedilol and Codeine    CHIEF COMPLAINT:  This 88 y.o. male comes for evaluation for Mohs' Micrographic Surgery, Fresh Tissue Technique, for treatment of a biopsy-proven squamous cell carcinoma on the left superior postauricular . Consultation requested by Josette Leal M.D..    The patient is accompanied to this visit by his wife.    HISTORY OF PRESENT ILLNESS:   Location: left superior postauricular  Duration: 2 months or more  Quality: persistent  Context: status post biopsy by Josette Leal M.D..; path = squamous cell carcinoma; pathology accession #[id number], [facility]   Prior Treatment: none    See also the handwritten notes/diagrams scanned to chart for additional details.    Defibrillator: No  Pacemaker: No  Artificial heart valves: No  Artificial joints: Right hip (2006)    REVIEW OF SYSTEMS:   General: general health good  Skin: has previous history of skin cancer(s)  CV: has no hypertension, has coronary artery disease and stents; and prior coronary artery bypass grafting; no artificial valves, has no chest pain  +++ HAS A DEFIBRILLATOR +++   Defibrillator has never fired  Resp: has exertional shortness of breath  Endo: has diabetes  Hem/Lymph: taking prescribed anticoagulants-PLAVIX, has easy bruising/bleeding  Allergy/Immuno: has allergies as noted above  GI: has no history of hepatitis  MS: as noted above     PAST MEDICAL HISTORY:  Past Medical History:   Diagnosis Date    Actinic keratosis     Anticoagulant long-term use     plavix    B12 deficiency anemia     Basal cell carcinoma 07/07/2014    right infra-auricular    Blepharoconjunctivitis of both eyes     Coronary artery disease 01/2017    1 stent 7/2016    Diabetes mellitus type II     Ectropion of eyelid     Lower lids    HLD (hyperlipidemia)     Insomnia     Ischemic cardiomyopathy 01/2017    Macular degeneration     Neuropathy due to secondary diabetes     Posterior  vitreous detachment of both eyes     Squamous Cell Carcinoma        PAST SURGICAL HISTORY:  Past Surgical History:   Procedure Laterality Date    CATARACT EXTRACTION      OU    CORNEAL TRANSPLANT Left 1995    CORONARY ANGIOPLASTY WITH STENT PLACEMENT  07/2016    DSEK- OS      EYE SURGERY      HEART CATH-LEFT Right 7/28/2016    [facility]    HIP SURGERY      total hip replacement    INSERTION-ICD-SINGLE N/A 1/23/2017    [facility] CATH    JOINT REPLACEMENT          SOCIAL HISTORY:  Dependencies: smoking status as noted below  Social History     Tobacco Use    Smoking status: Never Smoker    Smokeless tobacco: Never Used   Substance Use Topics    Alcohol use: No    Drug use: No       PERTINENT MEDICATIONS:  See medications list.    Current Outpatient Medications:     alcohol swabs PadM, Apply 1 each topically as needed., Disp: 100 each, Rfl: 3    benazepril (LOTENSIN) 5 MG tablet, Take 1 tablet (5 mg total) by mouth once daily., Disp: 90 tablet, Rfl: 3    blood glucose control, normal Soln, Check glucose daily, Disp: 1 each, Rfl: 3    blood sugar diagnostic Strp, Check glucose daily, Disp: 100 each, Rfl: 3    blood-glucose meter Misc, Check glucose daily, Disp: 1 each, Rfl: 0    clopidogrel (PLAVIX) 75 mg tablet, Take 1 tablet (75 mg total) by mouth once daily., Disp: 90 tablet, Rfl: 3    erythromycin (ROMYCIN) ophthalmic ointment, , Disp: , Rfl: 3    lidocaine-prilocaine (EMLA) cream, , Disp: , Rfl:     metFORMIN (GLUCOPHAGE-XR) 750 MG 24 hr tablet, Take 1 tablet (750 mg total) by mouth 2 (two) times daily with meals., Disp: 180 tablet, Rfl: 3    MINERAL OIL/PETROLATUM,WHITE (SYSTANE NIGHTTIME OPHT), Apply to eye every evening., Disp: , Rfl:     PROPYLENE GLYCOL/ (SYSTANE ULTRA OPHT), Place 1 drop into both eyes 3 (three) times daily., Disp: , Rfl:     rosuvastatin (CRESTOR) 10 MG tablet, Take 1 tablet (10 mg  total) by mouth once daily., Disp: 90 tablet, Rfl: 3    temazepam (RESTORIL) 30 mg capsule, Take 1 capsule (30 mg total) by mouth nightly as needed., Disp: 90 capsule, Rfl: 1    ALLERGIES:  Carvedilol and Codeine    EXAM:  See also the handwritten notes/diagrams scanned to chart for additional details.  Constitutional  General appearance: well-developed, well-nourished, well-kempt older white male    Eyes  Inspection of conjunctivae and lids reveals some bilateral mild ectropion; sclerae anicteric  Neurologic/Psychiatric  Alert,  normal orientation to time, place, person  Normal mood and affect with no evidence of depression, anxiety, agitation  Skin: see photo(s)  Head: background marked solar damage to exposed areas of skin; in addition, inspection/palpation reveals an approximately 1.5 cm raised, crateriform tumor on the left postauricular skin which feels freely movable over the underlying tissues on palpation; site(s) confirmed by reference to the photograph(s) in the chart taken at the time of the biopsy/biopsies by the referring physician and he confirmed this as the site of the prior biopsy  Neck: examination reveals marked chronic solar damage  Right upper extremity: examination reveals marked chronic solar damage  Left upper extremity: examination reveals marked chronic solar damage    ASSESSMENT: biopsy-proven squamous cell carcinoma of the left postauricular neck  chronic solar damage to areas as noted above  personal history of non-melanoma skin cancer  Long term current use of anticoagulant    PLAN:  The diagnosis and management options, and risks and benefits of the alternatives, including observation/non-treatment, radiation treatment, excision with vertical frozen section or paraffin-embedded section margin evaluation, and Mohs' Micrographic Surgery, Fresh Tissue Technique, were discussed at length with the patient. In particular, the discussion included, but was not limited to, the  following:    One alternative at this point would be to defer further treatment and observe the lesion. With small skin cancers of this kind, it is possible that a biopsy can be sufficient to definitively treat a small skin cancer of this kind. Alternatively, some skin cancers are slow growing and do not require immediate treatment. The potential advantage of this choice would be to avoid the need for possibly unnecessary additional surgery. Among the potential disadvantages of this would be the possibility of enlargement of the lesion, more extensive spread of the lesion or recurrence at a later date, which might necessitate a larger and more complex surgery.    Radiation treatment can be an effective treatment for this type of skin cancer. The usual course of treatment is every weekday for several weeks. Local irritation will result from treatment, although no systemic side effects are expected. The potential advantage of radiation treatment is that it avoids the need for surgery. Among the disadvantages of radiation treatment are the length of treatment, the local inflammatory response, the absence of pathologic confirmation of the removal of the skin cancer, a possible increased risk of additional skin cancer in the treated area in later years, and a somewhat increased risk of recurrence at a later date.     Excisional surgery can be an effective treatment for this type of skin cancer. This would involve excision of the lesion with margin evaluation by submitting the specimen to a pathologist for either immediate marginal assessment via frozen section processing, or delayed marginal assessment by fixed-tissue processing. The potential advantage of this technique is that it offers a way of treating the lesion with some degree of histologic confirmation of tumor removal. Among the disadvantages of this treatment are the possible need for re-excision if marginal involvement is identified, a somewhat greater  likelihood of recurrence as compared to Mohs' surgery because of the less comprehensive margin evaluation inherent in the technique, and the general potential risks of surgery, including allergic reactions to the anesthetic and other materials used, infection, injury to nerves in the area with consequent loss of sensation or muscle function, and scarring or distortion of surrounding structures.    Mohs' surgery is a very effective treatment for this type of skin cancer. The potential advantage of Mohs' surgery is that this technique offers the greatest possible certainty of knowing that the skin cancer has been completely removed, with the removal of the least amount of normal tissue. The potential disadvantages of Mohs' surgery include the duration of the surgery, the possible need for a separate surgery for reconstruction following tumor removal, and scarring as a result. In addition, general potential risks of surgery as noted above also apply to treatment via Mohs' surgery.    In light of the nature of this tumor and the location on the tyree-auricular skin in an area of increased risk of recurrence,  Mohs' micrographic surgery was thought to be the most appropriate management choice, and this diagnosis is appropriate for treatment by Mohs' micrographic surgery.     Sufficient time was available for questions, and all questions were answered to his satisfaction. He fully understands the aims, risks, alternatives, and possible complications, and has elected to proceed with the surgery, and verbally consented to do so. The procedure has been scheduled for next week.    Routine pre-op instructions were given to him.    The patient was instructed to continue Plavix prior to surgery.    --------------------------------------  Note: Some or all of this note may have been generated using voice recognition software. There may be voice recognition errors including grammatical and/or spelling errors found in the text.  Attempts were made to correct these errors prior to signature.

## 2019-01-29 NOTE — LETTER
January 29, 2019      Josette Leal MD  [address] [address]           [facility]  [address] [facility] [address] [place] [address]  Phone: [phone]          Patient: Dom Guzman   MR Number: [id number]   YOB: 1930   Date of Visit: 1/29/2019       Dear Dr. Josette Leal:    Thank you for referring Dom Guzman to me for evaluation. Attached you will find relevant portions of my assessment and plan of care.    If you have questions, please do not hesitate to call me. I look forward to following Dom Guzman along with you.    Sincerely,    Sandro Alejo MD    Enclosure  CC:  No Recipients    If you would like to receive this communication electronically, please contact [email or web address] or [phone] to request more information on [facility] access.    For providers and/or their staff who would like to refer a patient to [facility], please contact us through our one-stop-shop provider referral line, [facility] , at [phone].    If you feel you have received this communication in error or would no longer like to receive these types of communications, please e-mail [email or web address]

## 2019-02-04 NOTE — PROGRESS NOTES
ALLERGIES:   Carvedilol and Codeine  +++ HAS A DEFIBRILLATOR +++     Current Outpatient Medications:     alcohol swabs PadM, Apply 1 each topically as needed., Disp: 100 each, Rfl: 3    benazepril (LOTENSIN) 5 MG tablet, Take 1 tablet (5 mg total) by mouth once daily., Disp: 90 tablet, Rfl: 3    blood glucose control, normal Soln, Check glucose daily, Disp: 1 each, Rfl: 3    blood sugar diagnostic Strp, Check glucose daily, Disp: 100 each, Rfl: 3    blood-glucose meter Misc, Check glucose daily, Disp: 1 each, Rfl: 0    clopidogrel (PLAVIX) 75 mg tablet, Take 1 tablet (75 mg total) by mouth once daily., Disp: 90 tablet, Rfl: 3    erythromycin (ROMYCIN) ophthalmic ointment, , Disp: , Rfl: 3    lidocaine-prilocaine (EMLA) cream, , Disp: , Rfl:     metFORMIN (GLUCOPHAGE-XR) 750 MG 24 hr tablet, Take 1 tablet (750 mg total) by mouth 2 (two) times daily with meals., Disp: 180 tablet, Rfl: 3    MINERAL OIL/PETROLATUM,WHITE (SYSTANE NIGHTTIME OPHT), Apply to eye every evening., Disp: , Rfl:     PROPYLENE GLYCOL/ (SYSTANE ULTRA OPHT), Place 1 drop into both eyes 3 (three) times daily., Disp: , Rfl:     rosuvastatin (CRESTOR) 10 MG tablet, Take 1 tablet (10 mg total) by mouth once daily., Disp: 90 tablet, Rfl: 3    temazepam (RESTORIL) 30 mg capsule, Take 1 capsule (30 mg total) by mouth nightly as needed., Disp: 90 capsule, Rfl: 1  -------------------------------------------------------------  PROCEDURE: Mohs' Micrographic Surgery    SITE: left superior postauricular    INDICATION: squamous cell carcinoma in an area at increased risk of recurrence    CASE NUMBER: [id number]      ANESTHETIC: 6.5 mL 1% Lidocaine with Epinephrine 1:100,000    SURGICAL PREP: Ethanol and Hibiclens    SURGEON: Sandro Alejo MD    ASSISTANTS: Gurdeep Frias CST    PREOPERATIVE DIAGNOSIS: squamous cell carcinoma     POSTOPERATIVE DIAGNOSIS: squamous cell carcinoma     PATHOLOGIC DIAGNOSIS: squamous cell carcinoma     STAGES  OF MOHS' SURGERY PERFORMED: two    TUMOR-FREE PLANE ACHIEVED: yes    HEMOSTASIS: Hyfrecation     SPECIMENS: three (two in stage A, one in stage B)    INITIAL LESION SIZE: 2.3 x 2.3 cm    FINAL DEFECT SIZE: 2.9 x 2.9 cm    WOUND REPAIR/DISPOSITION: see below    NARRATIVE:    The patient is a 88 y.o.male referred by Josette Leal MD with a history of cancer on the left postauricular area which was biopsied - pathology accession #[id number], [facility]. Findings revealed squamous cell carcinoma. Examination revealed a crateriform tumor on the left postauricular skin at the site of prior biopsy, which was confirmed by reference to the photograph taken at the previous patient visit. In light of the nature of this tumor and the location, Mohs' micrographic surgery was thought to be the most appropriate management choice, and this diagnosis is appropriate for treatment by Mohs' micrographic surgery.  I discussed it with the patient and he fully understands the aims, risks, alternatives, and possible complications, and elects to proceed.  There are no medical or surgical contraindications to the procedure.     A signed informed consent was obtained.    PROCEDURE:  The patient was placed in the right lateral decubitus position on the operating table in the Mohs' Surgery Suite. The area in question was thoroughly prepped with ethanol and Hibiclens, with particular care to avoid introduction into the external auditory meatus. A sterile surgical marker was used to outline the clinically apparent margins of the involved area, and a narrow margin of normal-appearing skin. Reference marks were made at the periphery of the outlined area with the surgical marker. The proposed area of excision was measured and photographed. Local anesthesia as noted above was administered.  The total volume of anesthetic used throughout this portion of the procedure was as documented above. The area was prepared and draped in the standard  "manner. All of the grossly identifiable area of clinically abnormal tissue with a #10 blade followed by curettage, and an underlying/peripheral layer was taken and processed by the Mohs' technique.  Hemostasis was obtained with the hyfrecator. Tissue was taken from any areas of residual marginal involvement (if present) and processed by the Mohs' technique in as many stages as needed until a tumor-free plane was achieved.    Colors of inks used in the reference nicks at epidermal margins (if present) and/or inking of non-epithelial edges, if applicable, is represented on the Mohs map as follows: solid lines represent red ink, dots represent blue ink, jagged lines represent black ink, curlicues represent green ink, "xxx" represents yellow ink.    The first Mohs' layer consisted of two section(s) with 6 slide(s) evaluated. Histology of the specimen(s) showed, on section two, an area of dense lymphocytic inflammation with some possible tumor within the inflammatory infiltrate, necessitating a second Mohs' layer.     The second Mohs' layer consisted of one section(s) with 3 slide(s) evaluated. No residual tumor was noted at the margins of the second Mohs' layer. Histology of the specimen(s) showed chronic solar damage.    A total of three section(s) and 9 slide(s) were examined under the microscope via the Mohs technique.  A cancer free plane was reached after layer number two. Defect final size was as noted above.      The wound was covered with a nonadherent dressing between stages, and the patient allowed to wait in the waiting area during these periods. The final defect was photographed at the completion of the Mohs' procedure.    See the separate procedure note which follows regarding repair of the defect following Mohs' surgery.     -----------------------------------------------    REPAIR FOLLOWING MOHS' MICROGRAPHIC SURGERY    PREOPERATIVE DIAGNOSIS: defect following Mohs' surgery for a squamous cell " carcinoma    POSTOPERATIVE DIAGNOSIS: same    PROCEDURE PERFORMED: rhombic transposition flap    ANESTHETIC: 7 mL 1% Lidocaine with Epinephrine 1:100,000    SURGICAL PREP: Hibiclens    SURGEON: Sandro Alejo MD     ASSISTANTS: as above    LOCATION: left postauricular      INDICATIONS:  Earlier in the day, the patient underwent Mohs' micrographic surgical excision of a squamous cell carcinoma on the left postauricular area. Tumor free margins were achieved after layer number two.  Later in the day, the management of the resulting wound was addressed with the patient. I discussed the various wound management options with the patient and he fully understands the aims, risks, alternatives, and possible complications of the alternatives, and he elects to proceed with closure of the defect in the manner noted below.  There are no medical or surgical contraindications to the procedure.    A signed informed consent was previously obtained.    PROCEDURE:  Repair via adjacent tissue transfer:  The patient was returned to the procedure room following completion of the Mohs' procedure and final slide review. Because of the size and location of the defect, primary closure could not be achieved without significant distortion of the surrounding structures and an unacceptable risk of wound dehiscence. After discussing options for management of the wound with the patient, a local rhombic transposition flap was chosen to close the wound. The flap was outlined on the adjacent skin with a sterile surgical marker. Infiltration of local anesthetic to include the defect and the area of the flap was carried out. The flap was incised and undermined sufficiently to allow movement into the defect. The surrounding skin was undermined to minimize the tension of closure. Areas of redundant skin were excised to permit closure without creation of standing cone deformities. Hemostasis was achieved by hyfrecation. The flap was sutured into  place using:      multiple #4-0 buried interrupted Vicryl suture(s) and    multiple #4-0 simple interrupted Prolene suture(s) and    one #5-0 running locked and one #4-0 simple running Prolene suture(s) for final approximation of the wound margins.    Total area of the closure was approximately 8 square centimeters.     Total operative time including tissue processing in the Mohs' laboratory and microscopic Mohs' frozen section slide review was 3 hour(s). Verbal and written wound care instructions were given to the patient, and he expressed understanding of these instructions. The patient tolerated the procedure well and left the operating room in good condition; he is to return in 7 days for suture removal.     A prescription for cephalexin, 500 mg po QID x 7 days, was sent to his pharmacy for him to begin today.     Dr. Alejo's cell phone number was given to the patient with instructions to call prn with any problems.

## 2019-02-05 ENCOUNTER — PROCEDURE VISIT (OUTPATIENT)
Dept: DERMATOLOGY | Facility: CLINIC | Age: 84
End: 2019-02-05
Payer: MEDICARE

## 2019-02-05 VITALS — SYSTOLIC BLOOD PRESSURE: 176 MMHG | HEART RATE: 64 BPM | DIASTOLIC BLOOD PRESSURE: 84 MMHG

## 2019-02-05 DIAGNOSIS — C44.42 SQUAMOUS CELL CARCINOMA OF NECK: Primary | ICD-10-CM

## 2019-02-05 PROCEDURE — 17311: ICD-10-PCS | Mod: 51,HCNC,S$GLB, | Performed by: DERMATOLOGY

## 2019-02-05 PROCEDURE — 17312 MOHS ADDL STAGE: CPT | Mod: HCNC,S$GLB,, | Performed by: DERMATOLOGY

## 2019-02-05 PROCEDURE — 99499 NO LOS: ICD-10-PCS | Mod: HCNC,S$GLB,, | Performed by: DERMATOLOGY

## 2019-02-05 PROCEDURE — 14040 PR ADJ TISS XFER HEAD,FAC,HAND <10 SQCM: ICD-10-PCS | Mod: HCNC,S$GLB,, | Performed by: DERMATOLOGY

## 2019-02-05 PROCEDURE — 99499 UNLISTED E&M SERVICE: CPT | Mod: HCNC,S$GLB,, | Performed by: DERMATOLOGY

## 2019-02-05 PROCEDURE — 17311 MOHS 1 STAGE H/N/HF/G: CPT | Mod: 51,HCNC,S$GLB, | Performed by: DERMATOLOGY

## 2019-02-05 PROCEDURE — 17312: ICD-10-PCS | Mod: HCNC,S$GLB,, | Performed by: DERMATOLOGY

## 2019-02-05 PROCEDURE — 14040 TIS TRNFR F/C/C/M/N/A/G/H/F: CPT | Mod: HCNC,S$GLB,, | Performed by: DERMATOLOGY

## 2019-02-05 RX ORDER — CEPHALEXIN 500 MG/1
500 CAPSULE ORAL 4 TIMES DAILY
Qty: 28 CAPSULE | Refills: 0 | Status: SHIPPED | OUTPATIENT
Start: 2019-02-05 | End: 2019-11-14

## 2019-02-12 ENCOUNTER — OFFICE VISIT (OUTPATIENT)
Dept: DERMATOLOGY | Facility: CLINIC | Age: 84
End: 2019-02-12
Payer: MEDICARE

## 2019-02-12 DIAGNOSIS — Z48.02 VISIT FOR SUTURE REMOVAL: Primary | ICD-10-CM

## 2019-02-12 PROCEDURE — 99024 PR POST-OP FOLLOW-UP VISIT: ICD-10-PCS | Mod: HCNC,S$GLB,, | Performed by: DERMATOLOGY

## 2019-02-12 PROCEDURE — 99024 POSTOP FOLLOW-UP VISIT: CPT | Mod: HCNC,S$GLB,, | Performed by: DERMATOLOGY

## 2019-02-12 PROCEDURE — 99999 PR PBB SHADOW E&M-EST. PATIENT-LVL III: CPT | Mod: PBBFAC,HCNC,, | Performed by: DERMATOLOGY

## 2019-02-12 PROCEDURE — 99999 PR PBB SHADOW E&M-EST. PATIENT-LVL III: ICD-10-PCS | Mod: PBBFAC,HCNC,, | Performed by: DERMATOLOGY

## 2019-02-12 NOTE — PROGRESS NOTES
CC: 88 y.o.male patient is here for suture removal.     HPI: Patient is one week(s) s/p Mohs' micrographic surgery, fresh tissue technique of a squamous cell carcinoma on the left postauricular area, with subsequent repair via bilobed rhombic transposition flap  Patient reports no problems.    EXAM:  Sutures intact.  Wound healing well.  Good approximation of skin edges.  No undue erythema to the surrounding skin; the surface of the flap is undergoing some epidermolysis but shows no evidence of infection    IMPRESSION:  Healing well post Mohs' micrographic surgery and repair  Superficial epidermolysis of flap    PLAN:  Site cleaned with peroxide, sutures removed  Dressed with petrolatum, Telfa and tape  Reviewed further care and expected course with patient and his wife  Followup 4 weeks; call prn sooner

## 2019-03-19 ENCOUNTER — TELEPHONE (OUTPATIENT)
Dept: DERMATOLOGY | Facility: CLINIC | Age: 84
End: 2019-03-19

## 2019-03-19 NOTE — TELEPHONE ENCOUNTER
----- Message from Khalida Reagan sent at 3/19/2019  3:47 PM CDT -----  Type: Needs Medical Advice    Who Called:  Patient  Best Call Back Number: [phone]  Additional Information: Needs to reschedule his appointment tomorrow. Please call with availability.

## 2019-04-11 NOTE — PROGRESS NOTES
Refill Authorization Note     is requesting a refill authorization.    Brief assessment and rationale for refill: Quick DC: refill too soon (6/19)/ inappropriate refill request   Name and strength of medication: temazepam (RESTORIL) 30 mg capsule        Medication Therapy Plan: last escripted to [facility] for 6 month supply 12/13/2018; ; refill too soon quick dc     Medication reconciliation completed: No                         Comments:

## 2019-04-13 RX ORDER — TEMAZEPAM 30 MG/1
30 CAPSULE ORAL NIGHTLY PRN
Qty: 90 CAPSULE | Refills: 1 | Status: SHIPPED | OUTPATIENT
Start: 2019-04-13 | End: 2019-11-11 | Stop reason: SDUPTHER

## 2019-04-18 ENCOUNTER — TELEPHONE (OUTPATIENT)
Dept: DERMATOLOGY | Facility: CLINIC | Age: 84
End: 2019-04-18

## 2019-04-18 NOTE — TELEPHONE ENCOUNTER
----- Message from Anabela Braswell sent at 4/18/2019  8:38 AM CDT -----  Contact: Beulah Guzman (Spouse)  Type: Needs Medical Advice    Who Called:  Beulah Guzman (Spouse)  Best Call Back Number:   Additional Information: Calling to reschedule the patient's post op appt today at 1:15 pm due to the weather. Please advise.

## 2019-04-29 ENCOUNTER — CLINICAL SUPPORT (OUTPATIENT)
Dept: CARDIOLOGY | Facility: CLINIC | Age: 84
End: 2019-04-29
Attending: INTERNAL MEDICINE
Payer: MEDICARE

## 2019-04-29 DIAGNOSIS — Z95.810 CARDIAC DEFIBRILLATOR IN SITU: ICD-10-CM

## 2019-04-29 DIAGNOSIS — I25.5 ISCHEMIC CARDIOMYOPATHY: ICD-10-CM

## 2019-05-16 ENCOUNTER — OFFICE VISIT (OUTPATIENT)
Dept: DERMATOLOGY | Facility: CLINIC | Age: 84
End: 2019-05-16
Payer: MEDICARE

## 2019-05-16 DIAGNOSIS — Z98.890 HISTORY OF MOH'S MICROGRAPHIC SURGERY FOR SKIN CANCER: Primary | ICD-10-CM

## 2019-05-16 DIAGNOSIS — Z85.828 HISTORY OF MOH'S MICROGRAPHIC SURGERY FOR SKIN CANCER: Primary | ICD-10-CM

## 2019-05-16 PROCEDURE — 99024 PR POST-OP FOLLOW-UP VISIT: ICD-10-PCS | Mod: HCNC,S$GLB,, | Performed by: DERMATOLOGY

## 2019-05-16 PROCEDURE — 99999 PR PBB SHADOW E&M-EST. PATIENT-LVL II: ICD-10-PCS | Mod: PBBFAC,HCNC,, | Performed by: DERMATOLOGY

## 2019-05-16 PROCEDURE — 99999 PR PBB SHADOW E&M-EST. PATIENT-LVL II: CPT | Mod: PBBFAC,HCNC,, | Performed by: DERMATOLOGY

## 2019-05-16 PROCEDURE — 99024 POSTOP FOLLOW-UP VISIT: CPT | Mod: HCNC,S$GLB,, | Performed by: DERMATOLOGY

## 2019-05-16 NOTE — PROGRESS NOTES
CC: 88 y.o.male patient is here for followup     HPI: Patient is 3 months s/p Mohs' micrographic surgery, fresh tissue technique, of a squamous cell carcinoma on the left postauricular neck; with subsequent repair via transposition flap  Patient reports no problems to site    EXAM: Site appears well healed. Some residual erythema as anticipated.        IMPRESSION: Well healed post Mohs' micrographic surgery and repair    PLAN:  Discussed anticipated course  Followup to Dr. Leal in 2-3 months; prn to me

## 2019-05-29 ENCOUNTER — CLINICAL SUPPORT (OUTPATIENT)
Dept: CARDIOLOGY | Facility: CLINIC | Age: 84
End: 2019-05-29
Attending: INTERNAL MEDICINE
Payer: MEDICARE

## 2019-05-29 DIAGNOSIS — Z95.810 CARDIAC DEFIBRILLATOR IN SITU: ICD-10-CM

## 2019-05-29 DIAGNOSIS — I25.5 ISCHEMIC CARDIOMYOPATHY: ICD-10-CM

## 2019-06-05 ENCOUNTER — LAB VISIT (OUTPATIENT)
Dept: LAB | Facility: HOSPITAL | Age: 84
End: 2019-06-05
Attending: FAMILY MEDICINE
Payer: MEDICARE

## 2019-06-05 DIAGNOSIS — E11.9 TYPE 2 DIABETES MELLITUS WITHOUT COMPLICATION, WITHOUT LONG-TERM CURRENT USE OF INSULIN: ICD-10-CM

## 2019-06-05 LAB
ALBUMIN SERPL BCP-MCNC: 3.4 G/DL (ref 3.5–5.2)
ALP SERPL-CCNC: 53 U/L (ref 55–135)
ALT SERPL W/O P-5'-P-CCNC: 6 U/L (ref 10–44)
ANION GAP SERPL CALC-SCNC: 9 MMOL/L (ref 8–16)
AST SERPL-CCNC: 11 U/L (ref 10–40)
BILIRUB SERPL-MCNC: 0.4 MG/DL (ref 0.1–1)
BUN SERPL-MCNC: 18 MG/DL (ref 8–23)
CALCIUM SERPL-MCNC: 9.4 MG/DL (ref 8.7–10.5)
CHLORIDE SERPL-SCNC: 109 MMOL/L (ref 95–110)
CHOLEST SERPL-MCNC: 107 MG/DL (ref 120–199)
CHOLEST/HDLC SERPL: 3.1 {RATIO} (ref 2–5)
CO2 SERPL-SCNC: 24 MMOL/L (ref 23–29)
CREAT SERPL-MCNC: 1.4 MG/DL (ref 0.5–1.4)
EST. GFR  (AFRICAN AMERICAN): 51.5 ML/MIN/1.73 M^2
EST. GFR  (NON AFRICAN AMERICAN): 44.5 ML/MIN/1.73 M^2
ESTIMATED AVG GLUCOSE: 151 MG/DL (ref 68–131)
GLUCOSE SERPL-MCNC: 123 MG/DL (ref 70–110)
HBA1C MFR BLD HPLC: 6.9 % (ref 4–5.6)
HDLC SERPL-MCNC: 35 MG/DL (ref 40–75)
HDLC SERPL: 32.7 % (ref 20–50)
LDLC SERPL CALC-MCNC: 54.4 MG/DL (ref 63–159)
NONHDLC SERPL-MCNC: 72 MG/DL
POTASSIUM SERPL-SCNC: 4.5 MMOL/L (ref 3.5–5.1)
PROT SERPL-MCNC: 6.4 G/DL (ref 6–8.4)
SODIUM SERPL-SCNC: 142 MMOL/L (ref 136–145)
TRIGL SERPL-MCNC: 88 MG/DL (ref 30–150)

## 2019-06-05 PROCEDURE — 80053 COMPREHEN METABOLIC PANEL: CPT | Mod: HCNC

## 2019-06-05 PROCEDURE — 36415 COLL VENOUS BLD VENIPUNCTURE: CPT | Mod: HCNC,PO

## 2019-06-05 PROCEDURE — 83036 HEMOGLOBIN GLYCOSYLATED A1C: CPT | Mod: HCNC

## 2019-06-05 PROCEDURE — 80061 LIPID PANEL: CPT | Mod: HCNC

## 2019-06-10 DIAGNOSIS — Z95.810 CARDIAC DEFIBRILLATOR IN SITU: Primary | ICD-10-CM

## 2019-06-10 DIAGNOSIS — I25.5 ISCHEMIC CARDIOMYOPATHY: ICD-10-CM

## 2019-06-11 ENCOUNTER — PATIENT OUTREACH (OUTPATIENT)
Dept: ADMINISTRATIVE | Facility: HOSPITAL | Age: 84
End: 2019-06-11

## 2019-06-11 NOTE — PROGRESS NOTES
Health Maintenance Due   Topic Date Due    Shingles Vaccine (2 of 3) 07/16/2012    Eye Exam  03/19/2019    Foot Exam  05/29/2019     Chart review completed 06/11/2019  Future Appointments   Date Time Provider Department Center   6/27/2019 11:00 AM Carlos Eduardo Tobar MD [facility]

## 2019-06-27 ENCOUNTER — OFFICE VISIT (OUTPATIENT)
Dept: FAMILY MEDICINE | Facility: CLINIC | Age: 84
End: 2019-06-27
Payer: MEDICARE

## 2019-06-27 VITALS
OXYGEN SATURATION: 98 % | HEIGHT: 69 IN | BODY MASS INDEX: 23.54 KG/M2 | DIASTOLIC BLOOD PRESSURE: 68 MMHG | HEART RATE: 70 BPM | TEMPERATURE: 98 F | SYSTOLIC BLOOD PRESSURE: 122 MMHG | WEIGHT: 158.94 LBS

## 2019-06-27 DIAGNOSIS — I25.10 CORONARY ARTERY DISEASE INVOLVING NATIVE CORONARY ARTERY WITHOUT ANGINA PECTORIS, UNSPECIFIED WHETHER NATIVE OR TRANSPLANTED HEART: ICD-10-CM

## 2019-06-27 DIAGNOSIS — G47.00 INSOMNIA, UNSPECIFIED TYPE: ICD-10-CM

## 2019-06-27 DIAGNOSIS — E78.5 HYPERLIPIDEMIA, UNSPECIFIED HYPERLIPIDEMIA TYPE: ICD-10-CM

## 2019-06-27 DIAGNOSIS — E11.9 TYPE 2 DIABETES MELLITUS WITHOUT COMPLICATION, WITHOUT LONG-TERM CURRENT USE OF INSULIN: Primary | ICD-10-CM

## 2019-06-27 PROCEDURE — 99499 UNLISTED E&M SERVICE: CPT | Mod: HCNC,S$GLB,, | Performed by: FAMILY MEDICINE

## 2019-06-27 PROCEDURE — 99214 OFFICE O/P EST MOD 30 MIN: CPT | Mod: HCNC,S$GLB,, | Performed by: FAMILY MEDICINE

## 2019-06-27 PROCEDURE — 99999 PR PBB SHADOW E&M-EST. PATIENT-LVL IV: ICD-10-PCS | Mod: PBBFAC,HCNC,, | Performed by: FAMILY MEDICINE

## 2019-06-27 PROCEDURE — 1101F PR PT FALLS ASSESS DOC 0-1 FALLS W/OUT INJ PAST YR: ICD-10-PCS | Mod: HCNC,CPTII,S$GLB, | Performed by: FAMILY MEDICINE

## 2019-06-27 PROCEDURE — 99499 RISK ADDL DX/OHS AUDIT: ICD-10-PCS | Mod: HCNC,S$GLB,, | Performed by: FAMILY MEDICINE

## 2019-06-27 PROCEDURE — 99999 PR PBB SHADOW E&M-EST. PATIENT-LVL IV: CPT | Mod: PBBFAC,HCNC,, | Performed by: FAMILY MEDICINE

## 2019-06-27 PROCEDURE — 1101F PT FALLS ASSESS-DOCD LE1/YR: CPT | Mod: HCNC,CPTII,S$GLB, | Performed by: FAMILY MEDICINE

## 2019-06-27 PROCEDURE — 99214 PR OFFICE/OUTPT VISIT, EST, LEVL IV, 30-39 MIN: ICD-10-PCS | Mod: HCNC,S$GLB,, | Performed by: FAMILY MEDICINE

## 2019-06-27 NOTE — PROGRESS NOTES
Chief Complaint   Patient presents with    Follow-up     HPI:   Here for 3 month f/u on chronic health issues.    Diabetes - Takes metformin  BID; Bgs 140 in AM. No hypoglycemia. tolerating Lotensin 5mg  Neuropathy has been improved.  He is using a topical analgesic 4 times a day which is helping.    HLD - crestor 10mg daily  HTN - taking benazepril  Insomnia - well- controlled on Restoril 30mg nightly.  CAD s/p NSTEMI - underwent successful PCI of LAD; ICD; taking plavix daily  Taking oral B12    PAST MEDICAL HISTORY:   B12 deficiency   Osteoarthritis  macular degeneration   dyslipidemia   Diabetes with neuropathy  CAD    PAST SURGICAL HISTORY:   Right hip replacement   eye surgery   blood clot in his head, status post celso hole  skin surgeries.     SOCIAL HISTORY: No tobacco. No alcohol. Lives with family.     FAMILY HISTORY: No prostate or colon cancer.     REVIEW OF SYSTEMS:     GENERAL: No fever, chills, fatigability or weight changes.  SKIN/BREASTS: No rashes, sores, itching or changes in color or texture of skin. No changes in moles.   HEAD: No headaches or recent head trauma.   EYES: Visual acuity fine. Denies blurriness, tearing, itching, photophobia, diplopia, or visual changes.   EARS: Denies ear pain, discharge, tinnitus or vertigo. Denies hearing loss.   NOSE: No loss of smell, epistaxis, postnasal drip, discharge, obstruction, or sneezing.  MOUTH & THROAT: No hoarseness, change in voice, swallowing difficulty. No excessive gum bleeding.   HEMATOLOGICAL/NODES: Denies swollen glands. No bleeding or bruising.  CHEST: No ROBERTS, cyanosis, wheezing, cough or sputum production.  CARDIOVASCULAR: Denies chest pain, dyspnea, orthopnea, or palpitations.  GI/ABDOMEN: Appetite fine. No weight loss. Denies nausea, vomiting, diarrhea, constipation, abdominal pain, hematemesis or blood in stool.  URINARY: No dysuria,hematuria, nocturia, incontinence, flank pain, urgency, or urinary difficulty.  PERIPHERAL  "VASCULAR: No claudication, cold intolerance or cyanosis.  Reports numbness to feet and pain, which has been chronic. No worsening, no major improvement since starting Neurontin.  NEUROLOGIC: No history of seizures, paralysis, alteration of gait or coordination.    PSYCH:  No crying spells. Denies anxiety symptoms.    PHYSICAL EXAM:     /68   Pulse 70   Temp 97.7 °F (36.5 °C)   Ht 5' 9" (1.753 m)   Wt 72.1 kg (158 lb 15.2 oz)   SpO2 98%   BMI 23.47 kg/m²     GENERAL: He is a well-developed, well-nourished male, who appears in no apparent distress.   HEENT: NC/AT. PERRLA. EOMI. Mucous membranes are moist.   NECK: Supple. No carotid bruits. No thyroid enlargement.   CHEST: Clear to auscultation bilaterally with no crackles, rhonchi or wheezes.   CARDIOVASCULAR: Reveals a regular rate and rhythm. Normal S1 and S2. No murmurs, rubs or gallops are noted.   ABDOMEN: Positive bowel sounds, soft, and nontender. No HSM. No masses.   EXTREMITIES: No edema noted and pulses 2+ bilaterally.     Results for orders placed or performed in visit on 06/05/19   Hemoglobin A1c   Result Value Ref Range    Hemoglobin A1C 6.9 (H) 4.0 - 5.6 %    Estimated Avg Glucose 151 (H) 68 - 131 mg/dL   Comprehensive metabolic panel   Result Value Ref Range    Sodium 142 136 - 145 mmol/L    Potassium 4.5 3.5 - 5.1 mmol/L    Chloride 109 95 - 110 mmol/L    CO2 24 23 - 29 mmol/L    Glucose 123 (H) 70 - 110 mg/dL    BUN, Bld 18 8 - 23 mg/dL    Creatinine 1.4 0.5 - 1.4 mg/dL    Calcium 9.4 8.7 - 10.5 mg/dL    Total Protein 6.4 6.0 - 8.4 g/dL    Albumin 3.4 (L) 3.5 - 5.2 g/dL    Total Bilirubin 0.4 0.1 - 1.0 mg/dL    Alkaline Phosphatase 53 (L) 55 - 135 U/L    AST 11 10 - 40 U/L    ALT 6 (L) 10 - 44 U/L    Anion Gap 9 8 - 16 mmol/L    eGFR if African American 51.5 (A) >60 mL/min/1.73 m^2    eGFR if non African American 44.5 (A) >60 mL/min/1.73 m^2   Lipid panel   Result Value Ref Range    Cholesterol 107 (L) 120 - 199 mg/dL    Triglycerides 88 " 30 - 150 mg/dL    HDL 35 (L) 40 - 75 mg/dL    LDL Cholesterol 54.4 (L) 63.0 - 159.0 mg/dL    Hdl/Cholesterol Ratio 32.7 20.0 - 50.0 %    Total Cholesterol/HDL Ratio 3.1 2.0 - 5.0    Non-HDL Cholesterol 72 mg/dL     A/P:  Type 2 diabetes mellitus without complication, without long-term current use of insulin  -     Hemoglobin A1c; Future; Expected date: 12/24/2019  -     Comprehensive metabolic panel; Future; Expected date: 12/24/2019  -     Lipid panel; Future; Expected date: 12/24/2019  -     Microalbumin/creatinine urine ratio; Future; Expected date: 12/24/2019    Hyperlipidemia, unspecified hyperlipidemia type    Insomnia, unspecified type    Coronary artery disease involving native coronary artery without angina pectoris, unspecified whether native or transplanted heart  -     CBC auto differential; Future; Expected date: 12/24/2019    doing well  Continue metformin XR 750mg BID  Crestor 10mg, benazepril  Continue Restoril 30mg daily  f/u 6 months with labs  Continue oral B12

## 2019-06-27 NOTE — PROGRESS NOTES
Patient, Dom Guzman (MRN #[id number]), presented with a recent Estimated Glumerular Filtration Rate (EGFR) between 30 and 45 consistent with the definition of chronic kidney disease stage 3 - moderate (ICD10 - N18.3).    eGFR if non    Date Value Ref Range Status   06/05/2019 44.5 (A) >60 mL/min/1.73 m^2 Final     Comment:     Calculation used to obtain the estimated glomerular filtration  rate (eGFR) is the CKD-EPI equation.          The patient's chronic kidney disease stage 3 was monitored, evaluated, addressed and/or treated. This addendum to the medical record is made on 06/27/2019.

## 2019-07-01 ENCOUNTER — CLINICAL SUPPORT (OUTPATIENT)
Dept: CARDIOLOGY | Facility: CLINIC | Age: 84
End: 2019-07-01
Attending: INTERNAL MEDICINE
Payer: MEDICARE

## 2019-07-01 DIAGNOSIS — I25.5 ISCHEMIC CARDIOMYOPATHY: ICD-10-CM

## 2019-07-01 DIAGNOSIS — Z95.810 CARDIAC DEFIBRILLATOR IN SITU: ICD-10-CM

## 2019-07-03 DIAGNOSIS — Z95.810 CARDIAC DEFIBRILLATOR IN SITU: Primary | ICD-10-CM

## 2019-07-03 DIAGNOSIS — I25.5 ISCHEMIC CARDIOMYOPATHY: ICD-10-CM

## 2019-08-17 ENCOUNTER — CLINICAL SUPPORT (OUTPATIENT)
Dept: CARDIOLOGY | Facility: HOSPITAL | Age: 84
End: 2019-08-17
Payer: MEDICARE

## 2019-08-17 PROCEDURE — 93299 CARDIAC DEVICE CHECK - REMOTE: CPT | Performed by: INTERNAL MEDICINE

## 2019-08-17 PROCEDURE — 93297 CARDIAC DEVICE CHECK - REMOTE: ICD-10-PCS | Mod: ,,, | Performed by: INTERNAL MEDICINE

## 2019-08-17 PROCEDURE — 93297 REM INTERROG DEV EVAL ICPMS: CPT | Mod: ,,, | Performed by: INTERNAL MEDICINE

## 2019-09-16 ENCOUNTER — CLINICAL SUPPORT (OUTPATIENT)
Dept: CARDIOLOGY | Facility: CLINIC | Age: 84
End: 2019-09-16
Payer: MEDICARE

## 2019-09-16 PROCEDURE — 93297 REM INTERROG DEV EVAL ICPMS: CPT | Mod: ,,, | Performed by: INTERNAL MEDICINE

## 2019-09-16 PROCEDURE — 93299 CARDIAC DEVICE CHECK - REMOTE: ICD-10-PCS | Mod: ,,, | Performed by: INTERNAL MEDICINE

## 2019-09-16 PROCEDURE — 93297 CARDIAC DEVICE CHECK - REMOTE: ICD-10-PCS | Mod: ,,, | Performed by: INTERNAL MEDICINE

## 2019-09-16 PROCEDURE — 93299 CARDIAC DEVICE CHECK - REMOTE: CPT | Mod: ,,, | Performed by: INTERNAL MEDICINE

## 2019-09-26 ENCOUNTER — CLINICAL SUPPORT (OUTPATIENT)
Dept: CARDIOLOGY | Facility: HOSPITAL | Age: 84
End: 2019-09-26
Payer: MEDICARE

## 2019-09-26 PROCEDURE — 93295 CARDIAC DEVICE CHECK - REMOTE: ICD-10-PCS | Mod: ,,, | Performed by: INTERNAL MEDICINE

## 2019-09-26 PROCEDURE — 93295 DEV INTERROG REMOTE 1/2/MLT: CPT | Mod: ,,, | Performed by: INTERNAL MEDICINE

## 2019-09-26 PROCEDURE — 93296 REM INTERROG EVL PM/IDS: CPT | Mod: HCNC | Performed by: INTERNAL MEDICINE

## 2019-10-08 ENCOUNTER — PES CALL (OUTPATIENT)
Dept: ADMINISTRATIVE | Facility: CLINIC | Age: 84
End: 2019-10-08

## 2019-10-16 ENCOUNTER — CLINICAL SUPPORT (OUTPATIENT)
Dept: CARDIOLOGY | Facility: CLINIC | Age: 84
End: 2019-10-16
Payer: MEDICARE

## 2019-10-16 PROCEDURE — 93297 REM INTERROG DEV EVAL ICPMS: CPT | Mod: HCNC,S$GLB,, | Performed by: INTERNAL MEDICINE

## 2019-10-16 PROCEDURE — 93299 CARDIAC DEVICE CHECK - REMOTE: CPT | Mod: HCNC,S$GLB,, | Performed by: INTERNAL MEDICINE

## 2019-10-16 PROCEDURE — 93299 CARDIAC DEVICE CHECK - REMOTE: ICD-10-PCS | Mod: HCNC,S$GLB,, | Performed by: INTERNAL MEDICINE

## 2019-10-16 PROCEDURE — 93297 CARDIAC DEVICE CHECK - REMOTE: ICD-10-PCS | Mod: HCNC,S$GLB,, | Performed by: INTERNAL MEDICINE

## 2019-10-25 RX ORDER — ERYTHROMYCIN 5 MG/G
OINTMENT OPHTHALMIC NIGHTLY
Qty: 3.5 G | Refills: 3 | Status: SHIPPED | OUTPATIENT
Start: 2019-10-25 | End: 2020-05-20

## 2019-10-28 ENCOUNTER — IMMUNIZATION (OUTPATIENT)
Dept: PHARMACY | Facility: CLINIC | Age: 84
End: 2019-10-28
Payer: MEDICARE

## 2019-10-29 ENCOUNTER — TELEPHONE (OUTPATIENT)
Dept: DERMATOLOGY | Facility: CLINIC | Age: 84
End: 2019-10-29

## 2019-10-29 NOTE — TELEPHONE ENCOUNTER
----- Message from Shelia Saeed sent at 10/29/2019 10:44 AM CDT -----  Contact: Wife Maggi [phone]  She forgot about his appt for this morning, next available is in December.  She wants to speak with the nurse to reschedule. Thank you!

## 2019-11-11 RX ORDER — TEMAZEPAM 30 MG/1
30 CAPSULE ORAL NIGHTLY PRN
Qty: 90 CAPSULE | Refills: 1 | Status: SHIPPED | OUTPATIENT
Start: 2019-11-11 | End: 2020-05-20

## 2019-11-11 NOTE — PROGRESS NOTES
Refill Routing Note     Medication(s) are appropriate for refill:    Medication Outside of Protocol    Appointments past 12m or future 3m    Date Provider   Last Visit   6/27/2019 Carlos Eduardo Tobar MD   Next Visit   12/17/2019 Carlos Eduardo Tobar MD     [facility] Protocol Data:      Requested Prescriptions   Pending Prescriptions Disp Refills    temazepam (RESTORIL) 30 mg capsule [Pharmacy Med Name: TEMAZEPAM 30 MG CAPSULE 30 CAP] 90 capsule 1     Sig: TAKE 1 CAPSULE (30 MG TOTAL) BY MOUTH NIGHTLY AS NEEDED.       [employer] Refill Protocol Failed - 11/11/2019  9:45 AM        Failed - Patient seen within 3 months     Last visit with Carlos Eduardo Tobar MD: 6/27/2019  Last visit in [facility]: 6/27/2019    Patient's next visit in [facility]: 12/17/2019           Passed - Med not refilled within 4 weeks

## 2019-11-14 ENCOUNTER — OFFICE VISIT (OUTPATIENT)
Dept: DERMATOLOGY | Facility: CLINIC | Age: 84
End: 2019-11-14
Payer: MEDICARE

## 2019-11-14 VITALS — HEIGHT: 69 IN | WEIGHT: 158 LBS | BODY MASS INDEX: 23.4 KG/M2

## 2019-11-14 DIAGNOSIS — L57.0 ACTINIC KERATOSES: Primary | ICD-10-CM

## 2019-11-14 DIAGNOSIS — L82.1 SEBORRHEIC KERATOSES: ICD-10-CM

## 2019-11-14 DIAGNOSIS — L21.9 SEBORRHEIC DERMATITIS OF SCALP: ICD-10-CM

## 2019-11-14 DIAGNOSIS — Z85.828 HISTORY OF NONMELANOMA SKIN CANCER: ICD-10-CM

## 2019-11-14 PROCEDURE — 1101F PT FALLS ASSESS-DOCD LE1/YR: CPT | Mod: HCNC,CPTII,S$GLB, | Performed by: DERMATOLOGY

## 2019-11-14 PROCEDURE — 17004 PR DESTRUCTION, PREMALIGNANT LESIONS; 15 OR MORE LESIONS: ICD-10-PCS | Mod: HCNC,S$GLB,, | Performed by: DERMATOLOGY

## 2019-11-14 PROCEDURE — 1101F PR PT FALLS ASSESS DOC 0-1 FALLS W/OUT INJ PAST YR: ICD-10-PCS | Mod: HCNC,CPTII,S$GLB, | Performed by: DERMATOLOGY

## 2019-11-14 PROCEDURE — 99999 PR PBB SHADOW E&M-EST. PATIENT-LVL III: ICD-10-PCS | Mod: PBBFAC,HCNC,, | Performed by: DERMATOLOGY

## 2019-11-14 PROCEDURE — 99213 OFFICE O/P EST LOW 20 MIN: CPT | Mod: 25,HCNC,S$GLB, | Performed by: DERMATOLOGY

## 2019-11-14 PROCEDURE — 17004 DESTROY PREMAL LESIONS 15/>: CPT | Mod: HCNC,S$GLB,, | Performed by: DERMATOLOGY

## 2019-11-14 PROCEDURE — 99213 PR OFFICE/OUTPT VISIT, EST, LEVL III, 20-29 MIN: ICD-10-PCS | Mod: 25,HCNC,S$GLB, | Performed by: DERMATOLOGY

## 2019-11-14 PROCEDURE — 99999 PR PBB SHADOW E&M-EST. PATIENT-LVL III: CPT | Mod: PBBFAC,HCNC,, | Performed by: DERMATOLOGY

## 2019-11-14 RX ORDER — KETOCONAZOLE 20 MG/ML
SHAMPOO, SUSPENSION TOPICAL
Qty: 120 ML | Refills: 5 | Status: SHIPPED | OUTPATIENT
Start: 2019-11-14 | End: 2021-05-13 | Stop reason: SDUPTHER

## 2019-11-14 NOTE — PROGRESS NOTES
Subjective:       Patient ID:  Dom Guzman is a 88 y.o. male who presents for   Chief Complaint   Patient presents with    Spot     L post auricular, few months, rough, no tx    Skin Check     UBSE     Pt last seen 1-11-19 , new dx SCC on L post auricular scalp, tx via Mohs  2-5-19  *Defib    Here today for UBSE and spot to L post auricular scalp for few months.  Rough, no tx.    H/o 2 BCC R cheek and L infraauricular cheek, 2017 scheduled for E&S, no clinicial lesion remained at the time of procedure, elected to observe  H/o of R nasal sidewall lesion 03/2018, nodular BCC, referred to Dr Nolasco for Mohs and seen in consult, did not pursue surgery, patient felt resolved  H/o BCC  07/07/2014  right infra-auricular s/p Mohs  H/o SCC nose s/p Mohs     This is a high risk patient here to check for the development of new lesions.     S/p PDT face/upper hairline 01/2019      Review of Systems   Constitutional: Negative for fever, chills and fatigue.   Skin: Positive for activity-related sunscreen use and wears hat. Negative for daily sunscreen use.   Hematologic/Lymphatic: Bruises/bleeds easily.        Objective:    Physical Exam   Constitutional: He appears well-developed and well-nourished. No distress.   Neurological: He is alert and oriented to person, place, and time. He is not disoriented.   Psychiatric: He has a normal mood and affect.   Skin:   Areas Examined (abnormalities noted in diagram):   Scalp / Hair Palpated and Inspected  Head / Face Inspection Performed  Neck Inspection Performed  Chest / Axilla Inspection Performed  Abdomen Inspection Performed  Back Inspection Performed  RUE Inspected  LUE Inspection Performed                       Diagram Legend     Erythematous scaling macule/papule c/w actinic keratosis       Vascular papule c/w angioma      Pigmented verrucoid papule/plaque c/w seborrheic keratosis      Yellow umbilicated papule c/w sebaceous hyperplasia      Irregularly shaped tan  macule c/w lentigo     1-2 mm smooth white papules consistent with Milia      Movable subcutaneous cyst with punctum c/w epidermal inclusion cyst      Subcutaneous movable cyst c/w pilar cyst      Firm pink to brown papule c/w dermatofibroma      Pedunculated fleshy papule(s) c/w skin tag(s)      Evenly pigmented macule c/w junctional nevus     Mildly variegated pigmented, slightly irregular-bordered macule c/w mildly atypical nevus      Flesh colored to evenly pigmented papule c/w intradermal nevus       Pink pearly papule/plaque c/w basal cell carcinoma      Erythematous hyperkeratotic cursted plaque c/w SCC      Surgical scar with no sign of skin cancer recurrence      Open and closed comedones      Inflammatory papules and pustules      Verrucoid papule consistent consistent with wart     Erythematous eczematous patches and plaques     Dystrophic onycholytic nail with subungual debris c/w onychomycosis     Umbilicated papule    Erythematous-base heme-crusted tan verrucoid plaque consistent with inflamed seborrheic keratosis     Erythematous Silvery Scaling Plaque c/w Psoriasis     See annotation      Assessment / Plan:        Actinic keratoses  Cryosurgery Procedure Note    Verbal consent from the patient is obtained and the patient is aware of the precancerous quality and need for treatment of these lesions. Liquid nitrogen cryosurgery is applied to the 16 actinic keratoses, as detailed in the physical exam, to produce a freeze injury. The patient is aware that blisters may form and is instructed on wound care with gentle cleansing and use of vaseline ointment to keep moist until healed. The patient is supplied a handout on cryosurgery and is instructed to call if lesions do not completely resolve.    Plan repeat PDT to face and scalp in January/February  Ordered placed    Seborrheic dermatitis of scalp  -     ketoconazole (NIZORAL) 2 % shampoo; Wash hair and face with medicated shampoo at least 2x/week - let  sit on scalp at least 5 minutes prior to rinsing.  Dispense: 120 mL; Refill: 5    History of nonmelanoma skin cancer  Area of previous NMSC (multiple) examined. Site well healed with no signs of recurrence.  Upper body skin examination performed today including at least 9 points as noted in physical examination. No lesions suspicious for malignancy noted.    Seborrheic keratoses  These are benign inherited growths without a malignant potential. Reassurance given to patient. No treatment is necessary.     Patient instructed in importance in daily sun protection of at least spf 30. Mineral sunscreen ingredients preferred (Zinc +/- Titanium).   Recommend Elta MD for daily use on face and neck.  Patient encouraged to wear hat for all outdoor exposure.   Also discussed sun avoidance and use of protective clothing.           Follow up in about 6 months (around 5/14/2020).

## 2019-11-14 NOTE — PATIENT INSTRUCTIONS

## 2019-11-15 ENCOUNTER — TELEPHONE (OUTPATIENT)
Dept: DERMATOLOGY | Facility: CLINIC | Age: 84
End: 2019-11-15

## 2019-11-15 NOTE — TELEPHONE ENCOUNTER
----- Message from Harman Davenport sent at 11/15/2019 11:35 AM CST -----  Contact: Beulah - Spouse  Type: Needs Medical Advice    Who Called:  Beulah  Alo Call Back Number: [phone]  Additional Information: Caller states they were prescribed a medication yesterday but does not know the name and uncertain which pharmacy it was sent to. Please call to advise. Thanks!

## 2019-11-15 NOTE — TELEPHONE ENCOUNTER
Spoke with pt's wife, stated keto shampoo was sent to [facility]. Verbally understood. Advised to call with any issues.

## 2019-11-18 ENCOUNTER — TELEPHONE (OUTPATIENT)
Dept: DERMATOLOGY | Facility: CLINIC | Age: 84
End: 2019-11-18

## 2019-11-19 ENCOUNTER — TELEPHONE (OUTPATIENT)
Dept: DERMATOLOGY | Facility: CLINIC | Age: 84
End: 2019-11-19

## 2019-11-19 NOTE — TELEPHONE ENCOUNTER
The pt's wife wants her  to do PDT appt in Jan 2020 after the holidays.      ----- Message from Lacie Guzman sent at 11/18/2019  2:25 PM CST -----  Contact: patient wife  [phone]-please call above patient wife returning your call thanks.

## 2019-12-10 ENCOUNTER — LAB VISIT (OUTPATIENT)
Dept: LAB | Facility: HOSPITAL | Age: 84
End: 2019-12-10
Attending: FAMILY MEDICINE
Payer: MEDICARE

## 2019-12-10 DIAGNOSIS — E11.9 TYPE 2 DIABETES MELLITUS WITHOUT COMPLICATION, WITHOUT LONG-TERM CURRENT USE OF INSULIN: ICD-10-CM

## 2019-12-10 LAB
ALBUMIN/CREAT UR: 31.6 UG/MG (ref 0–30)
CREAT UR-MCNC: 98 MG/DL (ref 23–375)
MICROALBUMIN UR DL<=1MG/L-MCNC: 31 UG/ML

## 2019-12-10 PROCEDURE — 82043 UR ALBUMIN QUANTITATIVE: CPT | Mod: HCNC

## 2019-12-15 ENCOUNTER — CLINICAL SUPPORT (OUTPATIENT)
Dept: CARDIOLOGY | Facility: CLINIC | Age: 84
End: 2019-12-15
Payer: MEDICARE

## 2019-12-15 PROCEDURE — 93299 CARDIAC DEVICE CHECK - REMOTE: ICD-10-PCS | Mod: HCNC,S$GLB,, | Performed by: INTERNAL MEDICINE

## 2019-12-15 PROCEDURE — 93297 REM INTERROG DEV EVAL ICPMS: CPT | Mod: HCNC,S$GLB,, | Performed by: INTERNAL MEDICINE

## 2019-12-15 PROCEDURE — 93299 CARDIAC DEVICE CHECK - REMOTE: CPT | Mod: HCNC,S$GLB,, | Performed by: INTERNAL MEDICINE

## 2019-12-15 PROCEDURE — 93297 CARDIAC DEVICE CHECK - REMOTE: ICD-10-PCS | Mod: HCNC,S$GLB,, | Performed by: INTERNAL MEDICINE

## 2019-12-17 ENCOUNTER — OFFICE VISIT (OUTPATIENT)
Dept: FAMILY MEDICINE | Facility: CLINIC | Age: 84
End: 2019-12-17
Payer: MEDICARE

## 2019-12-17 VITALS
DIASTOLIC BLOOD PRESSURE: 82 MMHG | SYSTOLIC BLOOD PRESSURE: 138 MMHG | HEIGHT: 69 IN | WEIGHT: 161.38 LBS | TEMPERATURE: 98 F | HEART RATE: 75 BPM | BODY MASS INDEX: 23.9 KG/M2 | OXYGEN SATURATION: 96 %

## 2019-12-17 DIAGNOSIS — E78.5 HYPERLIPIDEMIA, UNSPECIFIED HYPERLIPIDEMIA TYPE: ICD-10-CM

## 2019-12-17 DIAGNOSIS — I25.10 CORONARY ARTERY DISEASE INVOLVING NATIVE CORONARY ARTERY WITHOUT ANGINA PECTORIS, UNSPECIFIED WHETHER NATIVE OR TRANSPLANTED HEART: ICD-10-CM

## 2019-12-17 DIAGNOSIS — E11.9 TYPE 2 DIABETES MELLITUS WITHOUT COMPLICATION, WITHOUT LONG-TERM CURRENT USE OF INSULIN: Primary | ICD-10-CM

## 2019-12-17 DIAGNOSIS — G47.00 INSOMNIA, UNSPECIFIED TYPE: ICD-10-CM

## 2019-12-17 PROCEDURE — 99999 PR PBB SHADOW E&M-EST. PATIENT-LVL IV: ICD-10-PCS | Mod: PBBFAC,HCNC,, | Performed by: FAMILY MEDICINE

## 2019-12-17 PROCEDURE — 99999 PR PBB SHADOW E&M-EST. PATIENT-LVL IV: CPT | Mod: PBBFAC,HCNC,, | Performed by: FAMILY MEDICINE

## 2019-12-17 PROCEDURE — 99214 OFFICE O/P EST MOD 30 MIN: CPT | Mod: HCNC,S$GLB,, | Performed by: FAMILY MEDICINE

## 2019-12-17 PROCEDURE — 99214 PR OFFICE/OUTPT VISIT, EST, LEVL IV, 30-39 MIN: ICD-10-PCS | Mod: HCNC,S$GLB,, | Performed by: FAMILY MEDICINE

## 2019-12-17 RX ORDER — BENAZEPRIL HYDROCHLORIDE 5 MG/1
5 TABLET ORAL DAILY
Qty: 90 TABLET | Refills: 3 | Status: SHIPPED | OUTPATIENT
Start: 2019-12-17 | End: 2020-11-25 | Stop reason: SDUPTHER

## 2019-12-17 RX ORDER — ROSUVASTATIN CALCIUM 10 MG/1
10 TABLET, COATED ORAL DAILY
Qty: 90 TABLET | Refills: 3 | Status: SHIPPED | OUTPATIENT
Start: 2019-12-17 | End: 2020-11-25 | Stop reason: SDUPTHER

## 2019-12-17 RX ORDER — CLOPIDOGREL BISULFATE 75 MG/1
75 TABLET ORAL DAILY
Qty: 90 TABLET | Refills: 3 | Status: SHIPPED | OUTPATIENT
Start: 2019-12-17 | End: 2020-11-25 | Stop reason: SDUPTHER

## 2019-12-17 NOTE — PROGRESS NOTES
Chief Complaint   Patient presents with    Follow-up     6 month     HPI:   Here for 3 month f/u on chronic health issues.    Diabetes - Takes metformin  BID; Bgs 125 in AM. No hypoglycemia. tolerating Lotensin 5mg  Neuropathy has been improved.  He is using a topical analgesic 4 times a day which is helping.    HLD - crestor 10mg daily  HTN - taking benazepril  Insomnia - well- controlled on Restoril 30mg nightly.  CAD s/p NSTEMI - underwent successful PCI of LAD; ICD; taking plavix daily  Taking oral B12    PAST MEDICAL HISTORY:   B12 deficiency   Osteoarthritis  macular degeneration   dyslipidemia   Diabetes with neuropathy  CAD    PAST SURGICAL HISTORY:   Right hip replacement   eye surgery   blood clot in his head, status post celso hole  skin surgeries.     SOCIAL HISTORY: No tobacco. No alcohol. Lives with family.     FAMILY HISTORY: No prostate or colon cancer.     REVIEW OF SYSTEMS:     GENERAL: No fever, chills, fatigability or weight changes.  SKIN/BREASTS: No rashes, sores, itching or changes in color or texture of skin. No changes in moles.   HEAD: No headaches or recent head trauma.   EYES: Visual acuity fine. Denies blurriness, tearing, itching, photophobia, diplopia, or visual changes.   EARS: Denies ear pain, discharge, tinnitus or vertigo. Denies hearing loss.   NOSE: No loss of smell, epistaxis, postnasal drip, discharge, obstruction, or sneezing.  MOUTH & THROAT: No hoarseness, change in voice, swallowing difficulty. No excessive gum bleeding.   HEMATOLOGICAL/NODES: Denies swollen glands. No bleeding or bruising.  CHEST: No ROBERTS, cyanosis, wheezing, cough or sputum production.  CARDIOVASCULAR: Denies chest pain, dyspnea, orthopnea, or palpitations.  GI/ABDOMEN: Appetite fine. No weight loss. Denies nausea, vomiting, diarrhea, constipation, abdominal pain, hematemesis or blood in stool.  URINARY: No dysuria,hematuria, nocturia, incontinence, flank pain, urgency, or urinary  "difficulty.  PERIPHERAL VASCULAR: No claudication, cold intolerance or cyanosis.  Reports numbness to feet and pain, which has been chronic. No worsening, no major improvement since starting Neurontin.  NEUROLOGIC: No history of seizures, paralysis, alteration of gait or coordination.    PSYCH:  No crying spells. Denies anxiety symptoms.    PHYSICAL EXAM:     /82 (BP Location: Left arm, Patient Position: Sitting)   Pulse 75   Temp 97.6 °F (36.4 °C) (Oral)   Ht 5' 9" (1.753 m)   Wt 73.2 kg (161 lb 6 oz)   SpO2 96%   BMI 23.83 kg/m²     GENERAL: He is a well-developed, well-nourished male, who appears in no apparent distress.   HEENT: NC/AT. PERRLA. EOMI. Mucous membranes are moist.   NECK: Supple. No carotid bruits. No thyroid enlargement.   CHEST: Clear to auscultation bilaterally with no crackles, rhonchi or wheezes.   CARDIOVASCULAR: Reveals a regular rate and rhythm. Normal S1 and S2. No murmurs, rubs or gallops are noted.   ABDOMEN: Positive bowel sounds, soft, and nontender. No HSM. No masses.   EXTREMITIES: No edema noted and pulses 2+ bilaterally.   Foot exam: inspection normal, decreased sensation to light touch; 2+ DP pulses bilaterally    Results for orders placed or performed in visit on 12/10/19   Hemoglobin A1c   Result Value Ref Range    Hemoglobin A1C 6.8 (H) 4.0 - 5.6 %    Estimated Avg Glucose 148 (H) 68 - 131 mg/dL   Comprehensive metabolic panel   Result Value Ref Range    Sodium 142 136 - 145 mmol/L    Potassium 5.1 3.5 - 5.1 mmol/L    Chloride 107 95 - 110 mmol/L    CO2 27 23 - 29 mmol/L    Glucose 125 (H) 70 - 110 mg/dL    BUN, Bld 16 8 - 23 mg/dL    Creatinine 1.4 0.5 - 1.4 mg/dL    Calcium 9.2 8.7 - 10.5 mg/dL    Total Protein 6.6 6.0 - 8.4 g/dL    Albumin 3.6 3.5 - 5.2 g/dL    Total Bilirubin 0.6 0.1 - 1.0 mg/dL    Alkaline Phosphatase 58 55 - 135 U/L    AST 13 10 - 40 U/L    ALT 9 (L) 10 - 44 U/L    Anion Gap 8 8 - 16 mmol/L    eGFR if African American 51.1 (A) >60 mL/min/1.73 " m^2    eGFR if non  44.2 (A) >60 mL/min/1.73 m^2   Lipid panel   Result Value Ref Range    Cholesterol 109 (L) 120 - 199 mg/dL    Triglycerides 119 30 - 150 mg/dL    HDL 32 (L) 40 - 75 mg/dL    LDL Cholesterol 53.2 (L) 63.0 - 159.0 mg/dL    Hdl/Cholesterol Ratio 29.4 20.0 - 50.0 %    Total Cholesterol/HDL Ratio 3.4 2.0 - 5.0    Non-HDL Cholesterol 77 mg/dL   CBC auto differential   Result Value Ref Range    WBC 7.23 3.90 - 12.70 K/uL    RBC 3.98 (L) 4.60 - 6.20 M/uL    Hemoglobin 11.0 (L) 14.0 - 18.0 g/dL    Hematocrit 36.7 (L) 40.0 - 54.0 %    Mean Corpuscular Volume 92 82 - 98 fL    Mean Corpuscular Hemoglobin 27.6 27.0 - 31.0 pg    Mean Corpuscular Hemoglobin Conc 30.0 (L) 32.0 - 36.0 g/dL    RDW 12.5 11.5 - 14.5 %    Platelets 226 150 - 350 K/uL    MPV 10.0 9.2 - 12.9 fL    Immature Granulocytes 0.3 0.0 - 0.5 %    Gran # (ANC) 4.2 1.8 - 7.7 K/uL    Immature Grans (Abs) 0.02 0.00 - 0.04 K/uL    Lymph # 2.2 1.0 - 4.8 K/uL    Mono # 0.6 0.3 - 1.0 K/uL    Eos # 0.2 0.0 - 0.5 K/uL    Baso # 0.03 0.00 - 0.20 K/uL    nRBC 0 0 /100 WBC    Gran% 58.6 38.0 - 73.0 %    Lymph% 30.0 18.0 - 48.0 %    Mono% 8.6 4.0 - 15.0 %    Eosinophil% 2.1 0.0 - 8.0 %    Basophil% 0.4 0.0 - 1.9 %    Differential Method Automated      A/P:  Type 2 diabetes mellitus without complication, without long-term current use of insulin  -     Ambulatory referral to Optometry  -     Hemoglobin A1c; Future; Expected date: 06/14/2020  -     Comprehensive metabolic panel; Future; Expected date: 06/14/2020  -     benazepril (LOTENSIN) 5 MG tablet; Take 1 tablet (5 mg total) by mouth once daily.  Dispense: 90 tablet; Refill: 3    Hyperlipidemia, unspecified hyperlipidemia type  -     rosuvastatin (CRESTOR) 10 MG tablet; Take 1 tablet (10 mg total) by mouth once daily.  Dispense: 90 tablet; Refill: 3    Insomnia, unspecified type    Coronary artery disease involving native coronary artery without angina pectoris, unspecified whether native  or transplanted heart  -     clopidogrel (PLAVIX) 75 mg tablet; Take 1 tablet (75 mg total) by mouth once daily.  Dispense: 90 tablet; Refill: 3    doing well  Continue metformin XR 750mg BID  Crestor 10mg, benazepril  Continue Restoril 30mg daily  f/u 6 months with labs  Continue oral B12

## 2020-01-14 ENCOUNTER — CLINICAL SUPPORT (OUTPATIENT)
Dept: CARDIOLOGY | Facility: CLINIC | Age: 85
End: 2020-01-14
Payer: MEDICARE

## 2020-01-15 ENCOUNTER — TELEPHONE (OUTPATIENT)
Dept: DERMATOLOGY | Facility: CLINIC | Age: 85
End: 2020-01-15

## 2020-01-15 NOTE — TELEPHONE ENCOUNTER
The pt's wife said she wants the pt to have the Blue Light on the [place].  She will wait for the machine.

## 2020-02-12 ENCOUNTER — CLINICAL SUPPORT (OUTPATIENT)
Dept: CARDIOLOGY | Facility: CLINIC | Age: 85
End: 2020-02-12
Attending: INTERNAL MEDICINE
Payer: MEDICARE

## 2020-02-12 DIAGNOSIS — Z95.810 CARDIAC DEFIBRILLATOR IN SITU: ICD-10-CM

## 2020-02-12 DIAGNOSIS — I25.5 ISCHEMIC CARDIOMYOPATHY: ICD-10-CM

## 2020-03-14 ENCOUNTER — CLINICAL SUPPORT (OUTPATIENT)
Dept: CARDIOLOGY | Facility: CLINIC | Age: 85
End: 2020-03-14
Payer: MEDICARE

## 2020-03-14 PROCEDURE — 93297 CARDIAC DEVICE CHECK - REMOTE: ICD-10-PCS | Mod: HCNC,S$GLB,, | Performed by: INTERNAL MEDICINE

## 2020-03-14 PROCEDURE — 93297 REM INTERROG DEV EVAL ICPMS: CPT | Mod: HCNC,S$GLB,, | Performed by: INTERNAL MEDICINE

## 2020-04-13 ENCOUNTER — CLINICAL SUPPORT (OUTPATIENT)
Dept: CARDIOLOGY | Facility: CLINIC | Age: 85
End: 2020-04-13
Payer: MEDICARE

## 2020-04-13 PROCEDURE — 93297 REM INTERROG DEV EVAL ICPMS: CPT | Mod: HCNC,S$GLB,, | Performed by: INTERNAL MEDICINE

## 2020-04-13 PROCEDURE — 93297 CARDIAC DEVICE CHECK - REMOTE: ICD-10-PCS | Mod: HCNC,S$GLB,, | Performed by: INTERNAL MEDICINE

## 2020-05-13 ENCOUNTER — CLINICAL SUPPORT (OUTPATIENT)
Dept: CARDIOLOGY | Facility: CLINIC | Age: 85
End: 2020-05-13
Payer: MEDICARE

## 2020-05-20 DIAGNOSIS — G47.00 INSOMNIA, UNSPECIFIED TYPE: Primary | ICD-10-CM

## 2020-05-20 RX ORDER — ERYTHROMYCIN 5 MG/G
OINTMENT OPHTHALMIC NIGHTLY
Qty: 3.5 G | Refills: 3 | OUTPATIENT
Start: 2020-05-20

## 2020-05-20 NOTE — TELEPHONE ENCOUNTER
----- Message from Tatianna Escalante sent at 5/20/2020 10:17 AM CDT -----  Contact: Patient's wife  Type:  RX Refill Request    Who Called:  Beulah  Refdouglas or New Rx:  refill  RX Name and Strength:  erythromycin (ROMYCIN) ophthalmic ointment and temazepam (RESTORIL) 30 mg capsule  Is this a 30 day or 90 day RX:  90 day   Preferred Pharmacy with phone number:   [facility] - [place] [address]  [address] [address]  Phone: [phone] Fax: [phone]  Local or Mail Order:  Local  Ordering Provider:  Amadou Prajapati Call Back Number:  [phone]

## 2020-05-20 NOTE — TELEPHONE ENCOUNTER
Please see the Refill Note   and consider in Carlos Eduardo Tobar MD 's absence.      Thank you!  [facility]   Note composed:4:26 PM 05/20/2020

## 2020-05-20 NOTE — PROGRESS NOTES
Refill Routing Note    Medication(s) are not appropriate for processing by [facility]:       Outside of protocol        Medication Therapy Plan: Temazepam script sent today was set to print-no documentation that script was faxed or picked up; route to you; quick dc erythromycin-duplicate  Medication reconciliation completed: No      Automatic Epic Protocol Generated Data:    Requested Prescriptions   Pending Prescriptions Disp Refills    temazepam (RESTORIL) 30 mg capsule 90 capsule 1     Sig: Take 1 capsule (30 mg total) by mouth nightly as needed.       [employer] Refill Protocol Failed - 5/20/2020 10:22 AM        Failed - Patient seen within 3 months     Last visit with Carlos Eduardo Tobar MD: 12/17/2019  Last visit in [facility]: 12/17/2019    Patient's next visit in [facility]: 6/17/2020           Passed - Med not refilled within 4 weeks      Refused Prescriptions Disp Refills    erythromycin (ROMYCIN) ophthalmic ointment 3.5 g 3     Sig: Place into the left eye every evening.       Off-Protocol Failed - 5/20/2020 10:22 AM        Failed - Medication not assigned to a protocol, review manually.        Passed - Office visit in past 12 months or future 90 days.     Recent Outpatient Visits            5 months ago Type 2 diabetes mellitus without complication, without long-term current use of insulin    [facility] Carlos Eduardo Tobar MD    6 months ago Actinic keratoses    [facility] Josette Leal MD    10 months ago Type 2 diabetes mellitus without complication, without long-term current use of insulin    [facility] Carlos Eduardo Tobar MD    1 year ago History of Moh's micrographic surgery for skin cancer    [facility] Sandro Alejo MD    1 year ago Visit for suture removal    [facility] Sandro Alejo MD          Future Appointments              In 3 weeks LAB,  [place] [facility]    In 3 weeks [facility]ICE [facility]    In 4 weeks Carlos Eduardo Tobar MD [place] [facility]    In 1 month HOME MONITOR DEVICE CHECK, [facility]                Powered by [facility] - 5/20/2020 10:22 AM        This is a duplicate request of medication requested 2020-05-19. Check to see if the patient is requesting a refill from a new pharmacy.           Appointments  past 12m or future 3m with PCP    Date Provider   Last Visit   12/17/2019 Carlos Eduardo Tobar MD   Next Visit   6/17/2020 Carlos Eduardo Tobar MD   ED visits in past 90 days: 0     Note composed:4:25 PM 05/20/2020     Duplicate Pended Encounter(s)/ Last Prescribed Details:    Ordering Encounter Report     Associated Reports   View Encounter      Note composed:4:25 PM 05/20/2020

## 2020-05-22 RX ORDER — TEMAZEPAM 30 MG/1
30 CAPSULE ORAL NIGHTLY PRN
Qty: 90 CAPSULE | Refills: 1 | Status: SHIPPED | OUTPATIENT
Start: 2020-05-22 | End: 2021-01-04

## 2020-05-22 NOTE — PROGRESS NOTES
Script on 5/20 was set to print      Refill Routing Note    Medication(s) are not appropriate for processing by [facility]:       Outside of protocol           Medication reconciliation completed: No      Automatic Epic Protocol Generated Data:    Requested Prescriptions   Pending Prescriptions Disp Refills    temazepam (RESTORIL) 30 mg capsule [Pharmacy Med Name: TEMAZEPAM 30 MG CAPSULE 30 CAP] 90 capsule 1     Sig: TAKE 1 CAPSULE (30 MG TOTAL) BY MOUTH NIGHTLY AS NEEDED.       [facility] Refill Protocol Failed - 5/22/2020  9:49 AM        Failed - Patient seen within 3 months     Last visit with Carlos Eduardo Tobar MD: 12/17/2019  Last visit in [facility]: 12/17/2019    Patient's next visit in [facility]: 6/17/2020           Passed - Med not refilled within 4 weeks           Appointments  past 12m or future 3m with PCP    Date Provider   Last Visit   12/17/2019 Carlos Eduardo Tobar MD   Next Visit   6/17/2020 Carlos Eduardo Tobar MD   ED visits in past 90 days: 0     Note composed:12:56 PM 05/22/2020

## 2020-05-22 NOTE — TELEPHONE ENCOUNTER
NPs can not fill prescriptions for patients they have never seen, this will need to be routed to one of in clinic physicians until PCP returns. I will route it.

## 2020-05-25 RX ORDER — TEMAZEPAM 30 MG/1
30 CAPSULE ORAL NIGHTLY PRN
Qty: 90 CAPSULE | Refills: 0 | Status: SHIPPED | OUTPATIENT
Start: 2020-05-25 | End: 2021-03-19 | Stop reason: CLARIF

## 2020-06-10 ENCOUNTER — LAB VISIT (OUTPATIENT)
Dept: LAB | Facility: HOSPITAL | Age: 85
End: 2020-06-10
Attending: FAMILY MEDICINE
Payer: MEDICARE

## 2020-06-10 DIAGNOSIS — E11.9 TYPE 2 DIABETES MELLITUS WITHOUT COMPLICATION, WITHOUT LONG-TERM CURRENT USE OF INSULIN: ICD-10-CM

## 2020-06-10 LAB
ALBUMIN SERPL BCP-MCNC: 3.6 G/DL (ref 3.5–5.2)
ALP SERPL-CCNC: 64 U/L (ref 55–135)
ALT SERPL W/O P-5'-P-CCNC: 7 U/L (ref 10–44)
ANION GAP SERPL CALC-SCNC: 9 MMOL/L (ref 8–16)
AST SERPL-CCNC: 13 U/L (ref 10–40)
BILIRUB SERPL-MCNC: 0.4 MG/DL (ref 0.1–1)
BUN SERPL-MCNC: 15 MG/DL (ref 8–23)
CALCIUM SERPL-MCNC: 9 MG/DL (ref 8.7–10.5)
CHLORIDE SERPL-SCNC: 109 MMOL/L (ref 95–110)
CO2 SERPL-SCNC: 24 MMOL/L (ref 23–29)
CREAT SERPL-MCNC: 1.7 MG/DL (ref 0.5–1.4)
EST. GFR  (AFRICAN AMERICAN): 40.4 ML/MIN/1.73 M^2
EST. GFR  (NON AFRICAN AMERICAN): 35 ML/MIN/1.73 M^2
ESTIMATED AVG GLUCOSE: 163 MG/DL (ref 68–131)
GLUCOSE SERPL-MCNC: 139 MG/DL (ref 70–110)
HBA1C MFR BLD HPLC: 7.3 % (ref 4–5.6)
POTASSIUM SERPL-SCNC: 4.7 MMOL/L (ref 3.5–5.1)
PROT SERPL-MCNC: 6.8 G/DL (ref 6–8.4)
SODIUM SERPL-SCNC: 142 MMOL/L (ref 136–145)

## 2020-06-10 PROCEDURE — 83036 HEMOGLOBIN GLYCOSYLATED A1C: CPT | Mod: HCNC

## 2020-06-10 PROCEDURE — 80053 COMPREHEN METABOLIC PANEL: CPT | Mod: HCNC

## 2020-06-10 PROCEDURE — 36415 COLL VENOUS BLD VENIPUNCTURE: CPT | Mod: HCNC,PO

## 2020-06-12 ENCOUNTER — CLINICAL SUPPORT (OUTPATIENT)
Dept: CARDIOLOGY | Facility: CLINIC | Age: 85
End: 2020-06-12
Payer: MEDICARE

## 2020-06-12 DIAGNOSIS — Z95.810 PRESENCE OF AUTOMATIC (IMPLANTABLE) CARDIAC DEFIBRILLATOR: ICD-10-CM

## 2020-06-12 PROCEDURE — 93297 CARDIAC DEVICE CHECK - REMOTE: ICD-10-PCS | Mod: HCNC,S$GLB,, | Performed by: INTERNAL MEDICINE

## 2020-06-12 PROCEDURE — 93297 REM INTERROG DEV EVAL ICPMS: CPT | Mod: HCNC,S$GLB,, | Performed by: INTERNAL MEDICINE

## 2020-06-17 ENCOUNTER — OFFICE VISIT (OUTPATIENT)
Dept: FAMILY MEDICINE | Facility: CLINIC | Age: 85
End: 2020-06-17
Payer: MEDICARE

## 2020-06-17 VITALS
TEMPERATURE: 98 F | HEART RATE: 80 BPM | DIASTOLIC BLOOD PRESSURE: 70 MMHG | OXYGEN SATURATION: 97 % | WEIGHT: 165.13 LBS | BODY MASS INDEX: 24.38 KG/M2 | SYSTOLIC BLOOD PRESSURE: 126 MMHG

## 2020-06-17 DIAGNOSIS — I25.10 CORONARY ARTERY DISEASE INVOLVING NATIVE CORONARY ARTERY WITHOUT ANGINA PECTORIS, UNSPECIFIED WHETHER NATIVE OR TRANSPLANTED HEART: ICD-10-CM

## 2020-06-17 DIAGNOSIS — G47.00 INSOMNIA, UNSPECIFIED TYPE: ICD-10-CM

## 2020-06-17 DIAGNOSIS — E78.5 HYPERLIPIDEMIA, UNSPECIFIED HYPERLIPIDEMIA TYPE: ICD-10-CM

## 2020-06-17 DIAGNOSIS — E11.9 TYPE 2 DIABETES MELLITUS WITHOUT COMPLICATION, WITHOUT LONG-TERM CURRENT USE OF INSULIN: Primary | ICD-10-CM

## 2020-06-17 PROCEDURE — 99999 PR PBB SHADOW E&M-EST. PATIENT-LVL III: CPT | Mod: PBBFAC,HCNC,, | Performed by: FAMILY MEDICINE

## 2020-06-17 PROCEDURE — 99214 PR OFFICE/OUTPT VISIT, EST, LEVL IV, 30-39 MIN: ICD-10-PCS | Mod: HCNC,S$GLB,, | Performed by: FAMILY MEDICINE

## 2020-06-17 PROCEDURE — 1126F AMNT PAIN NOTED NONE PRSNT: CPT | Mod: HCNC,S$GLB,, | Performed by: FAMILY MEDICINE

## 2020-06-17 PROCEDURE — 3051F PR MOST RECENT HEMOGLOBIN A1C LEVEL 7.0 - < 8.0%: ICD-10-PCS | Mod: HCNC,CPTII,S$GLB, | Performed by: FAMILY MEDICINE

## 2020-06-17 PROCEDURE — 3051F HG A1C>EQUAL 7.0%<8.0%: CPT | Mod: HCNC,CPTII,S$GLB, | Performed by: FAMILY MEDICINE

## 2020-06-17 PROCEDURE — 99214 OFFICE O/P EST MOD 30 MIN: CPT | Mod: HCNC,S$GLB,, | Performed by: FAMILY MEDICINE

## 2020-06-17 PROCEDURE — 1126F PR PAIN SEVERITY QUANTIFIED, NO PAIN PRESENT: ICD-10-PCS | Mod: HCNC,S$GLB,, | Performed by: FAMILY MEDICINE

## 2020-06-17 PROCEDURE — 99499 UNLISTED E&M SERVICE: CPT | Mod: HCNC,S$GLB,, | Performed by: FAMILY MEDICINE

## 2020-06-17 PROCEDURE — 99499 RISK ADDL DX/OHS AUDIT: ICD-10-PCS | Mod: HCNC,S$GLB,, | Performed by: FAMILY MEDICINE

## 2020-06-17 PROCEDURE — 1101F PT FALLS ASSESS-DOCD LE1/YR: CPT | Mod: HCNC,CPTII,S$GLB, | Performed by: FAMILY MEDICINE

## 2020-06-17 PROCEDURE — 99999 PR PBB SHADOW E&M-EST. PATIENT-LVL III: ICD-10-PCS | Mod: PBBFAC,HCNC,, | Performed by: FAMILY MEDICINE

## 2020-06-17 PROCEDURE — 1159F MED LIST DOCD IN RCRD: CPT | Mod: HCNC,S$GLB,, | Performed by: FAMILY MEDICINE

## 2020-06-17 PROCEDURE — 1101F PR PT FALLS ASSESS DOC 0-1 FALLS W/OUT INJ PAST YR: ICD-10-PCS | Mod: HCNC,CPTII,S$GLB, | Performed by: FAMILY MEDICINE

## 2020-06-17 PROCEDURE — 1159F PR MEDICATION LIST DOCUMENTED IN MEDICAL RECORD: ICD-10-PCS | Mod: HCNC,S$GLB,, | Performed by: FAMILY MEDICINE

## 2020-06-17 NOTE — PROGRESS NOTES
Chief Complaint   Patient presents with    Follow-up     Type 2 diabetes     HPI:   Here for 6 month f/u on chronic health issues.    Diabetes - Takes metformin  QD-BID; Bgs 125 in AM. No hypoglycemia. tolerating Lotensin 5mg  Neuropathy has been improved.  He is using a topical analgesic 4 times a day which is helping.    HLD - crestor 10mg daily  HTN - taking benazepril  Insomnia - well- controlled on Restoril 30mg nightly.  CAD s/p NSTEMI - underwent successful PCI of LAD; ICD; taking plavix daily  Taking oral B12    PAST MEDICAL HISTORY:   B12 deficiency   Osteoarthritis  macular degeneration   dyslipidemia   Diabetes with neuropathy  CAD    PAST SURGICAL HISTORY:   Right hip replacement   eye surgery   blood clot in his head, status post celso hole  skin surgeries.     SOCIAL HISTORY: No tobacco. No alcohol. Lives with family.     FAMILY HISTORY: No prostate or colon cancer.     REVIEW OF SYSTEMS:     GENERAL: No fever, chills, fatigability or weight changes.  SKIN/BREASTS: No rashes, sores, itching or changes in color or texture of skin. No changes in moles.   HEAD: No headaches or recent head trauma.   EYES: Visual acuity fine. Denies blurriness, tearing, itching, photophobia, diplopia, or visual changes.   EARS: Denies ear pain, discharge, tinnitus or vertigo. Denies hearing loss.   NOSE: No loss of smell, epistaxis, postnasal drip, discharge, obstruction, or sneezing.  MOUTH & THROAT: No hoarseness, change in voice, swallowing difficulty. No excessive gum bleeding.   HEMATOLOGICAL/NODES: Denies swollen glands. No bleeding or bruising.  CHEST: No ROBERTS, cyanosis, wheezing, cough or sputum production.  CARDIOVASCULAR: Denies chest pain, dyspnea, orthopnea, or palpitations.  GI/ABDOMEN: Appetite fine. No weight loss. Denies nausea, vomiting, diarrhea, constipation, abdominal pain, hematemesis or blood in stool.  URINARY: No dysuria,hematuria, nocturia, incontinence, flank pain, urgency, or urinary  difficulty.  PERIPHERAL VASCULAR: No claudication, cold intolerance or cyanosis.  Reports numbness to feet and pain, which has been chronic. No worsening, no major improvement since starting Neurontin.  NEUROLOGIC: No history of seizures, paralysis, alteration of gait or coordination.    PSYCH:  No crying spells. Denies anxiety symptoms.    PHYSICAL EXAM:     /70 (BP Location: Left arm, Patient Position: Sitting)   Pulse 80   Temp 97.6 °F (36.4 °C) (Oral)   Wt 74.9 kg (165 lb 2 oz)   SpO2 97%   BMI 24.38 kg/m²     GENERAL: He is a well-developed, well-nourished male, who appears in no apparent distress.   HEENT: NC/AT. PERRLA. EOMI. Mucous membranes are moist.   NECK: Supple. No carotid bruits. No thyroid enlargement.   CHEST: Clear to auscultation bilaterally with no crackles, rhonchi or wheezes.   CARDIOVASCULAR: Reveals a regular rate and rhythm. Normal S1 and S2. No murmurs, rubs or gallops are noted.   ABDOMEN: Positive bowel sounds, soft, and nontender. No HSM. No masses.   EXTREMITIES: No edema noted and pulses 2+ bilaterally.     Results for orders placed or performed in visit on 06/10/20   Hemoglobin A1c   Result Value Ref Range    Hemoglobin A1C 7.3 (H) 4.0 - 5.6 %    Estimated Avg Glucose 163 (H) 68 - 131 mg/dL   Comprehensive metabolic panel   Result Value Ref Range    Sodium 142 136 - 145 mmol/L    Potassium 4.7 3.5 - 5.1 mmol/L    Chloride 109 95 - 110 mmol/L    CO2 24 23 - 29 mmol/L    Glucose 139 (H) 70 - 110 mg/dL    BUN, Bld 15 8 - 23 mg/dL    Creatinine 1.7 (H) 0.5 - 1.4 mg/dL    Calcium 9.0 8.7 - 10.5 mg/dL    Total Protein 6.8 6.0 - 8.4 g/dL    Albumin 3.6 3.5 - 5.2 g/dL    Total Bilirubin 0.4 0.1 - 1.0 mg/dL    Alkaline Phosphatase 64 55 - 135 U/L    AST 13 10 - 40 U/L    ALT 7 (L) 10 - 44 U/L    Anion Gap 9 8 - 16 mmol/L    eGFR if African American 40.4 (A) >60 mL/min/1.73 m^2    eGFR if non African American 35.0 (A) >60 mL/min/1.73 m^2     A/P:  Type 2 diabetes mellitus without  complication, without long-term current use of insulin  -     Hemoglobin A1C; Future; Expected date: 12/14/2020  -     Comprehensive metabolic panel; Future; Expected date: 12/14/2020  -     Lipid Panel; Future; Expected date: 12/14/2020  -     Microalbumin/creatinine urine ratio; Future; Expected date: 12/14/2020    Hyperlipidemia, unspecified hyperlipidemia type    Insomnia, unspecified type    Coronary artery disease involving native coronary artery without angina pectoris, unspecified whether native or transplanted heart       doing well  Continue metformin XR 750mg BID  Crestor 10mg, benazepril  Continue Restoril 30mg daily  f/u 6 months with labs  Continue oral B12

## 2020-07-15 ENCOUNTER — CLINICAL SUPPORT (OUTPATIENT)
Dept: CARDIOLOGY | Facility: CLINIC | Age: 85
End: 2020-07-15
Payer: MEDICARE

## 2020-07-15 DIAGNOSIS — Z95.810 PRESENCE OF AUTOMATIC (IMPLANTABLE) CARDIAC DEFIBRILLATOR: ICD-10-CM

## 2020-07-15 PROCEDURE — 93297 CARDIAC DEVICE CHECK - REMOTE: ICD-10-PCS | Mod: HCNC,S$GLB,, | Performed by: INTERNAL MEDICINE

## 2020-07-15 PROCEDURE — 93297 REM INTERROG DEV EVAL ICPMS: CPT | Mod: HCNC,S$GLB,, | Performed by: INTERNAL MEDICINE

## 2020-08-14 ENCOUNTER — CLINICAL SUPPORT (OUTPATIENT)
Dept: CARDIOLOGY | Facility: CLINIC | Age: 85
End: 2020-08-14
Payer: MEDICARE

## 2020-09-16 ENCOUNTER — CLINICAL SUPPORT (OUTPATIENT)
Dept: CARDIOLOGY | Facility: CLINIC | Age: 85
End: 2020-09-16
Payer: MEDICARE

## 2020-09-16 DIAGNOSIS — Z95.810 PRESENCE OF AUTOMATIC (IMPLANTABLE) CARDIAC DEFIBRILLATOR: ICD-10-CM

## 2020-09-16 PROCEDURE — 93297 CARDIAC DEVICE CHECK - REMOTE: ICD-10-PCS | Mod: HCNC,S$GLB,, | Performed by: INTERNAL MEDICINE

## 2020-09-16 PROCEDURE — 93297 REM INTERROG DEV EVAL ICPMS: CPT | Mod: HCNC,S$GLB,, | Performed by: INTERNAL MEDICINE

## 2020-09-20 ENCOUNTER — CLINICAL SUPPORT (OUTPATIENT)
Dept: CARDIOLOGY | Facility: CLINIC | Age: 85
End: 2020-09-20
Payer: MEDICARE

## 2020-09-20 DIAGNOSIS — Z95.810 PRESENCE OF AUTOMATIC (IMPLANTABLE) CARDIAC DEFIBRILLATOR: ICD-10-CM

## 2020-09-20 PROCEDURE — 93296 CARDIAC DEVICE CHECK - REMOTE: ICD-10-PCS | Mod: HCNC,S$GLB,, | Performed by: INTERNAL MEDICINE

## 2020-09-20 PROCEDURE — 93296 REM INTERROG EVL PM/IDS: CPT | Mod: HCNC,S$GLB,, | Performed by: INTERNAL MEDICINE

## 2020-10-06 ENCOUNTER — TELEPHONE (OUTPATIENT)
Dept: FAMILY MEDICINE | Facility: CLINIC | Age: 85
End: 2020-10-06

## 2020-10-12 ENCOUNTER — IMMUNIZATION (OUTPATIENT)
Dept: PHARMACY | Facility: CLINIC | Age: 85
End: 2020-10-12
Payer: MEDICARE

## 2020-10-16 ENCOUNTER — TELEPHONE (OUTPATIENT)
Dept: CARDIOLOGY | Facility: CLINIC | Age: 85
End: 2020-10-16

## 2020-10-16 ENCOUNTER — CLINICAL SUPPORT (OUTPATIENT)
Dept: CARDIOLOGY | Facility: CLINIC | Age: 85
End: 2020-10-16
Payer: MEDICARE

## 2020-10-16 DIAGNOSIS — Z95.810 PRESENCE OF AUTOMATIC (IMPLANTABLE) CARDIAC DEFIBRILLATOR: ICD-10-CM

## 2020-10-16 PROCEDURE — 93297 REM INTERROG DEV EVAL ICPMS: CPT | Mod: HCNC,S$GLB,, | Performed by: INTERNAL MEDICINE

## 2020-10-16 PROCEDURE — 93297 CARDIAC DEVICE CHECK - REMOTE: ICD-10-PCS | Mod: HCNC,S$GLB,, | Performed by: INTERNAL MEDICINE

## 2020-10-16 NOTE — TELEPHONE ENCOUNTER
Received the following alert from [employer] home monitoring:    RVp-93%  BPp-94%    Presenting rhythm: AS/ with PVC's    Ventricular Arrhythmias: None since last reporting date                 *Increased PVCs from 97/hour to 269/hour    HF status:  Thoracic impedance trending lower.     Spoke to pt's wife and pt. By her side, denies weight gain, shortness of breath or edema. Instructed to notify clinic if weight increases by>3 lbs in 24 hrs or > 5 lbs in a week. Spouse verbalized understanding    Appt. Scheduled for f/u with Dr. Rubio on 11/12/2020.    Message forwarded to Dr. Rubio for review of increased PVC count

## 2020-11-13 ENCOUNTER — PATIENT OUTREACH (OUTPATIENT)
Dept: ADMINISTRATIVE | Facility: OTHER | Age: 85
End: 2020-11-13

## 2020-11-13 NOTE — PROGRESS NOTES
LINKS immunization registry not responding  Care Everywhere updated  Health Maintenance updated  Chart reviewed for overdue Proactive [facility] (ASHA) health maintenance testing (CRS, Breast Ca, Diabetic Eye Exam)   Orders entered:N/A

## 2020-11-17 ENCOUNTER — CLINICAL SUPPORT (OUTPATIENT)
Dept: CARDIOLOGY | Facility: CLINIC | Age: 85
End: 2020-11-17
Payer: MEDICARE

## 2020-11-17 ENCOUNTER — LAB VISIT (OUTPATIENT)
Dept: LAB | Facility: HOSPITAL | Age: 85
End: 2020-11-17
Attending: FAMILY MEDICINE
Payer: MEDICARE

## 2020-11-17 ENCOUNTER — OFFICE VISIT (OUTPATIENT)
Dept: CARDIOLOGY | Facility: CLINIC | Age: 85
End: 2020-11-17
Payer: MEDICARE

## 2020-11-17 VITALS
HEIGHT: 69 IN | DIASTOLIC BLOOD PRESSURE: 71 MMHG | HEART RATE: 77 BPM | WEIGHT: 157.63 LBS | SYSTOLIC BLOOD PRESSURE: 133 MMHG | BODY MASS INDEX: 23.35 KG/M2

## 2020-11-17 DIAGNOSIS — E78.5 HYPERLIPIDEMIA, UNSPECIFIED HYPERLIPIDEMIA TYPE: ICD-10-CM

## 2020-11-17 DIAGNOSIS — E11.9 TYPE 2 DIABETES MELLITUS WITHOUT COMPLICATION, WITHOUT LONG-TERM CURRENT USE OF INSULIN: ICD-10-CM

## 2020-11-17 DIAGNOSIS — I25.5 ISCHEMIC CARDIOMYOPATHY: Primary | ICD-10-CM

## 2020-11-17 DIAGNOSIS — I25.10 CORONARY ARTERY DISEASE INVOLVING NATIVE CORONARY ARTERY WITHOUT ANGINA PECTORIS, UNSPECIFIED WHETHER NATIVE OR TRANSPLANTED HEART: ICD-10-CM

## 2020-11-17 DIAGNOSIS — Z95.810 PRESENCE OF AUTOMATIC (IMPLANTABLE) CARDIAC DEFIBRILLATOR: ICD-10-CM

## 2020-11-17 LAB
ALBUMIN/CREAT UR: 25.4 UG/MG (ref 0–30)
CREAT UR-MCNC: 114 MG/DL (ref 23–375)
MICROALBUMIN UR DL<=1MG/L-MCNC: 29 UG/ML

## 2020-11-17 PROCEDURE — 3288F PR FALLS RISK ASSESSMENT DOCUMENTED: ICD-10-PCS | Mod: HCNC,CPTII,S$GLB, | Performed by: INTERNAL MEDICINE

## 2020-11-17 PROCEDURE — 3288F FALL RISK ASSESSMENT DOCD: CPT | Mod: HCNC,CPTII,S$GLB, | Performed by: INTERNAL MEDICINE

## 2020-11-17 PROCEDURE — 1159F MED LIST DOCD IN RCRD: CPT | Mod: HCNC,S$GLB,, | Performed by: INTERNAL MEDICINE

## 2020-11-17 PROCEDURE — 1126F AMNT PAIN NOTED NONE PRSNT: CPT | Mod: HCNC,S$GLB,, | Performed by: INTERNAL MEDICINE

## 2020-11-17 PROCEDURE — 82043 UR ALBUMIN QUANTITATIVE: CPT | Mod: HCNC

## 2020-11-17 PROCEDURE — 93297 CARDIAC DEVICE CHECK - REMOTE: ICD-10-PCS | Mod: HCNC,S$GLB,, | Performed by: INTERNAL MEDICINE

## 2020-11-17 PROCEDURE — 99999 PR PBB SHADOW E&M-EST. PATIENT-LVL III: ICD-10-PCS | Mod: PBBFAC,HCNC,, | Performed by: INTERNAL MEDICINE

## 2020-11-17 PROCEDURE — 99214 OFFICE O/P EST MOD 30 MIN: CPT | Mod: HCNC,S$GLB,, | Performed by: INTERNAL MEDICINE

## 2020-11-17 PROCEDURE — 1101F PT FALLS ASSESS-DOCD LE1/YR: CPT | Mod: HCNC,CPTII,S$GLB, | Performed by: INTERNAL MEDICINE

## 2020-11-17 PROCEDURE — 93297 REM INTERROG DEV EVAL ICPMS: CPT | Mod: HCNC,S$GLB,, | Performed by: INTERNAL MEDICINE

## 2020-11-17 PROCEDURE — 99999 PR PBB SHADOW E&M-EST. PATIENT-LVL III: CPT | Mod: PBBFAC,HCNC,, | Performed by: INTERNAL MEDICINE

## 2020-11-17 PROCEDURE — 99214 PR OFFICE/OUTPT VISIT, EST, LEVL IV, 30-39 MIN: ICD-10-PCS | Mod: HCNC,S$GLB,, | Performed by: INTERNAL MEDICINE

## 2020-11-17 PROCEDURE — 1159F PR MEDICATION LIST DOCUMENTED IN MEDICAL RECORD: ICD-10-PCS | Mod: HCNC,S$GLB,, | Performed by: INTERNAL MEDICINE

## 2020-11-17 PROCEDURE — 1101F PR PT FALLS ASSESS DOC 0-1 FALLS W/OUT INJ PAST YR: ICD-10-PCS | Mod: HCNC,CPTII,S$GLB, | Performed by: INTERNAL MEDICINE

## 2020-11-17 PROCEDURE — 1126F PR PAIN SEVERITY QUANTIFIED, NO PAIN PRESENT: ICD-10-PCS | Mod: HCNC,S$GLB,, | Performed by: INTERNAL MEDICINE

## 2020-11-17 NOTE — PROGRESS NOTES
Subjective:    Patient ID:  Dom Guzman is a 89 y.o. male who presents for follow-up of ICM    HPI  He comes for follow up with no major problems, no chest pain, no shortness of breath.  FC II    Review of Systems   Constitution: Negative for decreased appetite, malaise/fatigue, weight gain and weight loss.   Cardiovascular: Positive for leg swelling. Negative for chest pain, dyspnea on exertion, palpitations and syncope.   Respiratory: Negative for cough and shortness of breath.    Gastrointestinal: Negative.    Neurological: Negative for weakness.   All other systems reviewed and are negative.       Objective:      Physical Exam   Constitutional: He is oriented to person, place, and time. He appears well-developed and well-nourished.   HENT:   Head: Normocephalic.   Eyes: Pupils are equal, round, and reactive to light.   Neck: Normal range of motion. Neck supple. No JVD present. Carotid bruit is not present. No thyromegaly present.   Cardiovascular: Normal rate, regular rhythm, normal heart sounds, intact distal pulses and normal pulses. PMI is not displaced. Exam reveals no gallop.   No murmur heard.  Pulmonary/Chest: Effort normal and breath sounds normal.   Abdominal: Soft. Normal appearance. He exhibits no mass. There is no hepatosplenomegaly. There is no abdominal tenderness.   Musculoskeletal: Normal range of motion.         General: Edema (1+ bilateral) present.   Neurological: He is alert and oriented to person, place, and time. He has normal strength and normal reflexes. No sensory deficit.   Skin: Skin is warm and intact.   Psychiatric: He has a normal mood and affect.   Nursing note and vitals reviewed.        Assessment:       1. Ischemic cardiomyopathy    2. Coronary artery disease involving native coronary artery without angina pectoris, unspecified whether native or transplanted heart    3. Hyperlipidemia, unspecified hyperlipidemia type         Plan:   Low salt diet  Continue all cardiac  medications  Regular exercise program  1 yr f/u with damon crabtree

## 2020-11-24 ENCOUNTER — PES CALL (OUTPATIENT)
Dept: ADMINISTRATIVE | Facility: CLINIC | Age: 85
End: 2020-11-24

## 2020-11-25 ENCOUNTER — OFFICE VISIT (OUTPATIENT)
Dept: FAMILY MEDICINE | Facility: CLINIC | Age: 85
End: 2020-11-25
Payer: MEDICARE

## 2020-11-25 VITALS
WEIGHT: 156.5 LBS | BODY MASS INDEX: 23.18 KG/M2 | HEIGHT: 69 IN | SYSTOLIC BLOOD PRESSURE: 134 MMHG | HEART RATE: 75 BPM | DIASTOLIC BLOOD PRESSURE: 72 MMHG | TEMPERATURE: 98 F | OXYGEN SATURATION: 96 %

## 2020-11-25 DIAGNOSIS — E78.5 HYPERLIPIDEMIA, UNSPECIFIED HYPERLIPIDEMIA TYPE: ICD-10-CM

## 2020-11-25 DIAGNOSIS — I25.10 CORONARY ARTERY DISEASE INVOLVING NATIVE CORONARY ARTERY WITHOUT ANGINA PECTORIS, UNSPECIFIED WHETHER NATIVE OR TRANSPLANTED HEART: Primary | ICD-10-CM

## 2020-11-25 DIAGNOSIS — E11.9 TYPE 2 DIABETES MELLITUS WITHOUT COMPLICATION, WITHOUT LONG-TERM CURRENT USE OF INSULIN: ICD-10-CM

## 2020-11-25 DIAGNOSIS — G47.00 INSOMNIA, UNSPECIFIED TYPE: ICD-10-CM

## 2020-11-25 PROCEDURE — 99214 PR OFFICE/OUTPT VISIT, EST, LEVL IV, 30-39 MIN: ICD-10-PCS | Mod: HCNC,S$GLB,, | Performed by: FAMILY MEDICINE

## 2020-11-25 PROCEDURE — 99214 OFFICE O/P EST MOD 30 MIN: CPT | Mod: HCNC,S$GLB,, | Performed by: FAMILY MEDICINE

## 2020-11-25 PROCEDURE — 3288F FALL RISK ASSESSMENT DOCD: CPT | Mod: HCNC,CPTII,S$GLB, | Performed by: FAMILY MEDICINE

## 2020-11-25 PROCEDURE — 3288F PR FALLS RISK ASSESSMENT DOCUMENTED: ICD-10-PCS | Mod: HCNC,CPTII,S$GLB, | Performed by: FAMILY MEDICINE

## 2020-11-25 PROCEDURE — 1126F PR PAIN SEVERITY QUANTIFIED, NO PAIN PRESENT: ICD-10-PCS | Mod: HCNC,S$GLB,, | Performed by: FAMILY MEDICINE

## 2020-11-25 PROCEDURE — 99999 PR PBB SHADOW E&M-EST. PATIENT-LVL IV: CPT | Mod: PBBFAC,HCNC,, | Performed by: FAMILY MEDICINE

## 2020-11-25 PROCEDURE — 1159F MED LIST DOCD IN RCRD: CPT | Mod: HCNC,S$GLB,, | Performed by: FAMILY MEDICINE

## 2020-11-25 PROCEDURE — 1126F AMNT PAIN NOTED NONE PRSNT: CPT | Mod: HCNC,S$GLB,, | Performed by: FAMILY MEDICINE

## 2020-11-25 PROCEDURE — 99999 PR PBB SHADOW E&M-EST. PATIENT-LVL IV: ICD-10-PCS | Mod: PBBFAC,HCNC,, | Performed by: FAMILY MEDICINE

## 2020-11-25 PROCEDURE — 99499 RISK ADDL DX/OHS AUDIT: ICD-10-PCS | Mod: S$GLB,,, | Performed by: FAMILY MEDICINE

## 2020-11-25 PROCEDURE — 1101F PT FALLS ASSESS-DOCD LE1/YR: CPT | Mod: HCNC,CPTII,S$GLB, | Performed by: FAMILY MEDICINE

## 2020-11-25 PROCEDURE — 1101F PR PT FALLS ASSESS DOC 0-1 FALLS W/OUT INJ PAST YR: ICD-10-PCS | Mod: HCNC,CPTII,S$GLB, | Performed by: FAMILY MEDICINE

## 2020-11-25 PROCEDURE — 99499 UNLISTED E&M SERVICE: CPT | Mod: S$GLB,,, | Performed by: FAMILY MEDICINE

## 2020-11-25 PROCEDURE — 1159F PR MEDICATION LIST DOCUMENTED IN MEDICAL RECORD: ICD-10-PCS | Mod: HCNC,S$GLB,, | Performed by: FAMILY MEDICINE

## 2020-11-25 RX ORDER — METFORMIN HYDROCHLORIDE 750 MG/1
750 TABLET, EXTENDED RELEASE ORAL 2 TIMES DAILY WITH MEALS
Qty: 180 TABLET | Refills: 3 | Status: ON HOLD | OUTPATIENT
Start: 2020-11-25 | End: 2021-01-01 | Stop reason: CLARIF

## 2020-11-25 RX ORDER — CLOPIDOGREL BISULFATE 75 MG/1
75 TABLET ORAL DAILY
Qty: 90 TABLET | Refills: 3 | Status: SHIPPED | OUTPATIENT
Start: 2020-11-25 | End: 2021-06-14

## 2020-11-25 RX ORDER — BENAZEPRIL HYDROCHLORIDE 5 MG/1
5 TABLET ORAL DAILY
Qty: 90 TABLET | Refills: 3 | Status: ON HOLD | OUTPATIENT
Start: 2020-11-25 | End: 2021-03-20 | Stop reason: HOSPADM

## 2020-11-25 RX ORDER — ROSUVASTATIN CALCIUM 10 MG/1
10 TABLET, COATED ORAL DAILY
Qty: 90 TABLET | Refills: 3 | Status: SHIPPED | OUTPATIENT
Start: 2020-11-25 | End: 2021-06-14 | Stop reason: SDUPTHER

## 2020-11-25 NOTE — PROGRESS NOTES
Chief Complaint   Patient presents with    6 month f/u     HPI:   Here for 6 month f/u on chronic health issues.    Diabetes - Takes metformin  QD-BID; Bgs 125 in AM. No hypoglycemia. tolerating Lotensin 5mg  Neuropathy has been improved.  He is using a topical analgesic 4 times a day which is helping.    HLD - crestor 10mg daily  HTN - taking benazepril  Insomnia - well- controlled on Restoril 30mg nightly.  CAD s/p NSTEMI - underwent successful PCI of LAD; ICD; taking plavix daily  Taking oral B12    PAST MEDICAL HISTORY:   B12 deficiency   Osteoarthritis  macular degeneration   dyslipidemia   Diabetes with neuropathy  CAD    PAST SURGICAL HISTORY:   Right hip replacement   eye surgery   blood clot in his head, status post celso hole  skin surgeries.     SOCIAL HISTORY: No tobacco. No alcohol. Lives with family.     FAMILY HISTORY: No prostate or colon cancer.     REVIEW OF SYSTEMS:     GENERAL: No fever, chills, fatigability or weight changes.  SKIN/BREASTS: No rashes, sores, itching or changes in color or texture of skin. No changes in moles.   HEAD: No headaches or recent head trauma.   EYES: Visual acuity fine. Denies blurriness, tearing, itching, photophobia, diplopia, or visual changes.   EARS: Denies ear pain, discharge, tinnitus or vertigo. Denies hearing loss.   NOSE: No loss of smell, epistaxis, postnasal drip, discharge, obstruction, or sneezing.  MOUTH & THROAT: No hoarseness, change in voice, swallowing difficulty. No excessive gum bleeding.   HEMATOLOGICAL/NODES: Denies swollen glands. No bleeding or bruising.  CHEST: No ROBERTS, cyanosis, wheezing, cough or sputum production.  CARDIOVASCULAR: Denies chest pain, dyspnea, orthopnea, or palpitations.  GI/ABDOMEN: Appetite fine. No weight loss. Denies nausea, vomiting, diarrhea, constipation, abdominal pain, hematemesis or blood in stool.  URINARY: No dysuria,hematuria, nocturia, incontinence, flank pain, urgency, or urinary difficulty.  PERIPHERAL  "VASCULAR: No claudication, cold intolerance or cyanosis.  Reports numbness to feet and pain, which has been chronic. No worsening, no major improvement since starting Neurontin.  NEUROLOGIC: No history of seizures, paralysis, alteration of gait or coordination.    PSYCH:  No crying spells. Denies anxiety symptoms.    PHYSICAL EXAM:     /72 (BP Location: Left arm, Patient Position: Sitting)   Pulse 75   Temp 98.4 °F (36.9 °C) (Oral)   Ht 5' 9" (1.753 m)   Wt 71 kg (156 lb 8.4 oz)   SpO2 96%   BMI 23.11 kg/m²     GENERAL: He is a well-developed, well-nourished male, who appears in no apparent distress.   HEENT: NC/AT. PERRLA. EOMI. Mucous membranes are moist.   NECK: Supple. No carotid bruits. No thyroid enlargement.   CHEST: Clear to auscultation bilaterally with no crackles, rhonchi or wheezes.   CARDIOVASCULAR: Reveals a regular rate and rhythm. Normal S1 and S2. No murmurs, rubs or gallops are noted.   ABDOMEN: Positive bowel sounds, soft, and nontender. No HSM. No masses.   EXTREMITIES: No edema noted and pulses 2+ bilaterally.     Results for orders placed or performed in visit on 11/17/20   Hemoglobin A1C   Result Value Ref Range    Hemoglobin A1C 6.5 (H) 4.0 - 5.6 %    Estimated Avg Glucose 140 (H) 68 - 131 mg/dL   Comprehensive metabolic panel   Result Value Ref Range    Sodium 139 136 - 145 mmol/L    Potassium 4.7 3.5 - 5.1 mmol/L    Chloride 106 95 - 110 mmol/L    CO2 25 23 - 29 mmol/L    Glucose 114 (H) 70 - 110 mg/dL    BUN 15 8 - 23 mg/dL    Creatinine 1.4 0.5 - 1.4 mg/dL    Calcium 8.8 8.7 - 10.5 mg/dL    Total Protein 6.7 6.0 - 8.4 g/dL    Albumin 3.5 3.5 - 5.2 g/dL    Total Bilirubin 0.3 0.1 - 1.0 mg/dL    Alkaline Phosphatase 66 55 - 135 U/L    AST 14 10 - 40 U/L    ALT 6 (L) 10 - 44 U/L    Anion Gap 8 8 - 16 mmol/L    eGFR if African American 51.1 (A) >60 mL/min/1.73 m^2    eGFR if non  44.2 (A) >60 mL/min/1.73 m^2   Lipid Panel   Result Value Ref Range    Cholesterol 104 " (L) 120 - 199 mg/dL    Triglycerides 91 30 - 150 mg/dL    HDL 35 (L) 40 - 75 mg/dL    LDL Cholesterol 50.8 (L) 63.0 - 159.0 mg/dL    HDL/Cholesterol Ratio 33.7 20.0 - 50.0 %    Total Cholesterol/HDL Ratio 3.0 2.0 - 5.0    Non-HDL Cholesterol 69 mg/dL     A/P:  Coronary artery disease involving native coronary artery without angina pectoris, unspecified whether native or transplanted heart  -     clopidogreL (PLAVIX) 75 mg tablet; Take 1 tablet (75 mg total) by mouth once daily.  Dispense: 90 tablet; Refill: 3    Type 2 diabetes mellitus without complication, without long-term current use of insulin  -     Comprehensive Metabolic Panel; Future; Expected date: 02/23/2021  -     Hemoglobin A1C; Future; Expected date: 02/23/2021  -     benazepriL (LOTENSIN) 5 MG tablet; Take 1 tablet (5 mg total) by mouth once daily.  Dispense: 90 tablet; Refill: 3  -     metFORMIN (GLUCOPHAGE-XR) 750 MG ER 24hr tablet; Take 1 tablet (750 mg total) by mouth 2 (two) times daily with meals.  Dispense: 180 tablet; Refill: 3    Hyperlipidemia, unspecified hyperlipidemia type  -     rosuvastatin (CRESTOR) 10 MG tablet; Take 1 tablet (10 mg total) by mouth once daily.  Dispense: 90 tablet; Refill: 3    Insomnia, unspecified type       doing well  Continue metformin XR 750mg BID  Crestor 10mg, benazepril  Continue Restoril 30mg daily  Counseled on regular exercise, maintenance of a healthy weight, balanced diet rich in fruits/vegetables and lean protein, and avoidance of unhealthy habits like smoking and excessive alcohol intake.  f/u 6 months with labs  Continue oral B12

## 2020-12-09 DIAGNOSIS — M17.10 PRIMARY OSTEOARTHRITIS OF KNEE, UNSPECIFIED LATERALITY: Primary | ICD-10-CM

## 2020-12-17 ENCOUNTER — CLINICAL SUPPORT (OUTPATIENT)
Dept: CARDIOLOGY | Facility: CLINIC | Age: 85
End: 2020-12-17
Payer: MEDICARE

## 2020-12-17 DIAGNOSIS — Z95.810 PRESENCE OF AUTOMATIC (IMPLANTABLE) CARDIAC DEFIBRILLATOR: ICD-10-CM

## 2020-12-17 PROCEDURE — 93297 REM INTERROG DEV EVAL ICPMS: CPT | Mod: HCNC,S$GLB,, | Performed by: INTERNAL MEDICINE

## 2020-12-17 PROCEDURE — 93297 CARDIAC DEVICE CHECK - REMOTE: ICD-10-PCS | Mod: HCNC,S$GLB,, | Performed by: INTERNAL MEDICINE

## 2020-12-19 ENCOUNTER — CLINICAL SUPPORT (OUTPATIENT)
Dept: CARDIOLOGY | Facility: CLINIC | Age: 85
End: 2020-12-19
Payer: MEDICARE

## 2020-12-19 DIAGNOSIS — Z95.810 PRESENCE OF AUTOMATIC (IMPLANTABLE) CARDIAC DEFIBRILLATOR: ICD-10-CM

## 2021-01-01 ENCOUNTER — TELEPHONE (OUTPATIENT)
Dept: OTOLARYNGOLOGY | Facility: CLINIC | Age: 86
End: 2021-01-01

## 2021-01-01 ENCOUNTER — TELEPHONE (OUTPATIENT)
Dept: FAMILY MEDICINE | Facility: CLINIC | Age: 86
End: 2021-01-01
Payer: MEDICARE

## 2021-01-01 ENCOUNTER — OFFICE VISIT (OUTPATIENT)
Dept: OTOLARYNGOLOGY | Facility: CLINIC | Age: 86
End: 2021-01-01
Payer: MEDICARE

## 2021-01-01 ENCOUNTER — CLINICAL SUPPORT (OUTPATIENT)
Dept: CARDIOLOGY | Facility: HOSPITAL | Age: 86
End: 2021-01-01
Payer: MEDICARE

## 2021-01-01 ENCOUNTER — OFFICE VISIT (OUTPATIENT)
Dept: FAMILY MEDICINE | Facility: CLINIC | Age: 86
End: 2021-01-01
Payer: MEDICARE

## 2021-01-01 ENCOUNTER — TELEPHONE (OUTPATIENT)
Dept: AUDIOLOGY | Facility: CLINIC | Age: 86
End: 2021-01-01

## 2021-01-01 ENCOUNTER — TELEPHONE (OUTPATIENT)
Dept: DERMATOLOGY | Facility: CLINIC | Age: 86
End: 2021-01-01

## 2021-01-01 ENCOUNTER — TELEPHONE (OUTPATIENT)
Dept: FAMILY MEDICINE | Facility: CLINIC | Age: 86
End: 2021-01-01

## 2021-01-01 ENCOUNTER — IMMUNIZATION (OUTPATIENT)
Dept: FAMILY MEDICINE | Facility: CLINIC | Age: 86
End: 2021-01-01
Payer: MEDICARE

## 2021-01-01 ENCOUNTER — CLINICAL SUPPORT (OUTPATIENT)
Dept: AUDIOLOGY | Facility: CLINIC | Age: 86
End: 2021-01-01
Payer: MEDICARE

## 2021-01-01 ENCOUNTER — LAB VISIT (OUTPATIENT)
Dept: LAB | Facility: HOSPITAL | Age: 86
End: 2021-01-01
Attending: FAMILY MEDICINE
Payer: MEDICARE

## 2021-01-01 ENCOUNTER — HOSPITAL ENCOUNTER (OUTPATIENT)
Dept: CARDIOLOGY | Facility: HOSPITAL | Age: 86
Discharge: HOME | End: 2021-06-25
Payer: MEDICARE

## 2021-01-01 ENCOUNTER — PATIENT OUTREACH (OUTPATIENT)
Dept: ADMINISTRATIVE | Facility: OTHER | Age: 86
End: 2021-01-01

## 2021-01-01 VITALS
HEART RATE: 82 BPM | SYSTOLIC BLOOD PRESSURE: 144 MMHG | DIASTOLIC BLOOD PRESSURE: 62 MMHG | TEMPERATURE: 98 F | HEIGHT: 69 IN | OXYGEN SATURATION: 100 % | BODY MASS INDEX: 21.55 KG/M2 | WEIGHT: 145.5 LBS

## 2021-01-01 VITALS
HEIGHT: 69 IN | DIASTOLIC BLOOD PRESSURE: 58 MMHG | WEIGHT: 145.5 LBS | OXYGEN SATURATION: 97 % | SYSTOLIC BLOOD PRESSURE: 118 MMHG | BODY MASS INDEX: 21.55 KG/M2 | HEART RATE: 73 BPM | TEMPERATURE: 98 F

## 2021-01-01 VITALS
DIASTOLIC BLOOD PRESSURE: 48 MMHG | HEART RATE: 83 BPM | SYSTOLIC BLOOD PRESSURE: 108 MMHG | HEIGHT: 70 IN | BODY MASS INDEX: 20.33 KG/M2 | OXYGEN SATURATION: 96 % | WEIGHT: 142 LBS | TEMPERATURE: 98 F

## 2021-01-01 VITALS
BODY MASS INDEX: 21.55 KG/M2 | HEIGHT: 69 IN | DIASTOLIC BLOOD PRESSURE: 54 MMHG | WEIGHT: 145.5 LBS | SYSTOLIC BLOOD PRESSURE: 103 MMHG

## 2021-01-01 VITALS — WEIGHT: 145.5 LBS | HEIGHT: 69 IN | BODY MASS INDEX: 21.55 KG/M2

## 2021-01-01 DIAGNOSIS — D64.9 SEVERE ANEMIA: ICD-10-CM

## 2021-01-01 DIAGNOSIS — R42 VERTIGO: ICD-10-CM

## 2021-01-01 DIAGNOSIS — D64.9 SYMPTOMATIC ANEMIA: ICD-10-CM

## 2021-01-01 DIAGNOSIS — Z23 NEED FOR VACCINATION: Primary | ICD-10-CM

## 2021-01-01 DIAGNOSIS — Z95.810 PRESENCE OF AUTOMATIC (IMPLANTABLE) CARDIAC DEFIBRILLATOR: ICD-10-CM

## 2021-01-01 DIAGNOSIS — H91.90 HEARING DIFFICULTY, UNSPECIFIED LATERALITY: Primary | ICD-10-CM

## 2021-01-01 DIAGNOSIS — Z97.4 WEARS HEARING AID IN BOTH EARS: ICD-10-CM

## 2021-01-01 DIAGNOSIS — E78.5 HYPERLIPIDEMIA, UNSPECIFIED HYPERLIPIDEMIA TYPE: ICD-10-CM

## 2021-01-01 DIAGNOSIS — R63.0 DECREASED APPETITE: ICD-10-CM

## 2021-01-01 DIAGNOSIS — R63.4 WEIGHT LOSS: Primary | ICD-10-CM

## 2021-01-01 DIAGNOSIS — H91.90 HEARING DIFFICULTY, UNSPECIFIED LATERALITY: ICD-10-CM

## 2021-01-01 DIAGNOSIS — Z91.81 AT HIGH RISK FOR FALLS: Primary | ICD-10-CM

## 2021-01-01 DIAGNOSIS — I95.1 ORTHOSTATIC HYPOTENSION: ICD-10-CM

## 2021-01-01 DIAGNOSIS — F43.21 GRIEF REACTION: ICD-10-CM

## 2021-01-01 DIAGNOSIS — M54.32 SCIATICA OF LEFT SIDE: Primary | ICD-10-CM

## 2021-01-01 DIAGNOSIS — N18.31 STAGE 3A CHRONIC KIDNEY DISEASE: ICD-10-CM

## 2021-01-01 DIAGNOSIS — G47.00 INSOMNIA, UNSPECIFIED TYPE: ICD-10-CM

## 2021-01-01 DIAGNOSIS — R42 VERTIGO: Primary | ICD-10-CM

## 2021-01-01 LAB
ALBUMIN SERPL BCP-MCNC: 2.7 G/DL (ref 3.5–5.2)
ALP SERPL-CCNC: 100 U/L (ref 55–135)
ALT SERPL W/O P-5'-P-CCNC: 9 U/L (ref 10–44)
ANION GAP SERPL CALC-SCNC: 11 MMOL/L (ref 8–16)
AST SERPL-CCNC: 15 U/L (ref 10–40)
BASOPHILS # BLD AUTO: 0.05 K/UL (ref 0–0.2)
BASOPHILS NFR BLD: 0.3 % (ref 0–1.9)
BILIRUB SERPL-MCNC: 0.4 MG/DL (ref 0.1–1)
BUN SERPL-MCNC: 18 MG/DL (ref 10–30)
CALCIUM SERPL-MCNC: 8 MG/DL (ref 8.7–10.5)
CHLORIDE SERPL-SCNC: 107 MMOL/L (ref 95–110)
CO2 SERPL-SCNC: 22 MMOL/L (ref 23–29)
CREAT SERPL-MCNC: 1.1 MG/DL (ref 0.5–1.4)
DIFFERENTIAL METHOD: ABNORMAL
EOSINOPHIL # BLD AUTO: 0.2 K/UL (ref 0–0.5)
EOSINOPHIL NFR BLD: 1.7 % (ref 0–8)
ERYTHROCYTE [DISTWIDTH] IN BLOOD BY AUTOMATED COUNT: 19.9 % (ref 11.5–14.5)
EST. GFR  (AFRICAN AMERICAN): >60 ML/MIN/1.73 M^2
EST. GFR  (NON AFRICAN AMERICAN): 58 ML/MIN/1.73 M^2
GLUCOSE SERPL-MCNC: 106 MG/DL (ref 70–110)
HCT VFR BLD AUTO: 30.5 % (ref 40–54)
HGB BLD-MCNC: 9.1 G/DL (ref 14–18)
IMM GRANULOCYTES # BLD AUTO: 0.05 K/UL (ref 0–0.04)
IMM GRANULOCYTES NFR BLD AUTO: 0.3 % (ref 0–0.5)
LYMPHOCYTES # BLD AUTO: 1.6 K/UL (ref 1–4.8)
LYMPHOCYTES NFR BLD: 10.9 % (ref 18–48)
MCH RBC QN AUTO: 23 PG (ref 27–31)
MCHC RBC AUTO-ENTMCNC: 29.8 G/DL (ref 32–36)
MCV RBC AUTO: 77 FL (ref 82–98)
MONOCYTES # BLD AUTO: 1.1 K/UL (ref 0.3–1)
MONOCYTES NFR BLD: 7.3 % (ref 4–15)
NEUTROPHILS # BLD AUTO: 11.4 K/UL (ref 1.8–7.7)
NEUTROPHILS NFR BLD: 79.5 % (ref 38–73)
NRBC BLD-RTO: 0 /100 WBC
PLATELET # BLD AUTO: 320 K/UL (ref 150–450)
PMV BLD AUTO: 9 FL (ref 9.2–12.9)
POTASSIUM SERPL-SCNC: 4.3 MMOL/L (ref 3.5–5.1)
PROT SERPL-MCNC: 6.2 G/DL (ref 6–8.4)
RBC # BLD AUTO: 3.95 M/UL (ref 4.6–6.2)
SODIUM SERPL-SCNC: 140 MMOL/L (ref 136–145)
WBC # BLD AUTO: 14.4 K/UL (ref 3.9–12.7)

## 2021-01-01 PROCEDURE — 96372 THER/PROPH/DIAG INJ SC/IM: CPT | Mod: 59,HCNC,S$GLB, | Performed by: NURSE PRACTITIONER

## 2021-01-01 PROCEDURE — 93295 CARDIAC DEVICE CHECK - REMOTE: ICD-10-PCS | Mod: ,,, | Performed by: INTERNAL MEDICINE

## 2021-01-01 PROCEDURE — 1126F AMNT PAIN NOTED NONE PRSNT: CPT | Mod: CPTII,S$GLB,, | Performed by: NURSE PRACTITIONER

## 2021-01-01 PROCEDURE — 1160F RVW MEDS BY RX/DR IN RCRD: CPT | Mod: HCNC,CPTII,S$GLB, | Performed by: NURSE PRACTITIONER

## 2021-01-01 PROCEDURE — 99499 RISK ADDL DX/OHS AUDIT: ICD-10-PCS | Mod: HCNC,S$GLB,, | Performed by: FAMILY MEDICINE

## 2021-01-01 PROCEDURE — 1126F PR PAIN SEVERITY QUANTIFIED, NO PAIN PRESENT: ICD-10-PCS | Mod: CPTII,S$GLB,, | Performed by: NURSE PRACTITIONER

## 2021-01-01 PROCEDURE — 93295 DEV INTERROG REMOTE 1/2/MLT: CPT | Mod: ,,, | Performed by: INTERNAL MEDICINE

## 2021-01-01 PROCEDURE — 0004A COVID-19, MRNA, LNP-S, PF, 30 MCG/0.3 ML DOSE VACCINE: CPT | Mod: HCNC,PBBFAC | Performed by: FAMILY MEDICINE

## 2021-01-01 PROCEDURE — 3288F PR FALLS RISK ASSESSMENT DOCUMENTED: ICD-10-PCS | Mod: CPTII,S$GLB,, | Performed by: NURSE PRACTITIONER

## 2021-01-01 PROCEDURE — G2066 INTER DEVC REMOTE 30D: HCPCS | Mod: HCNC,PO | Performed by: INTERNAL MEDICINE

## 2021-01-01 PROCEDURE — 99214 OFFICE O/P EST MOD 30 MIN: CPT | Mod: 25,HCNC,S$GLB, | Performed by: NURSE PRACTITIONER

## 2021-01-01 PROCEDURE — 1160F PR REVIEW ALL MEDS BY PRESCRIBER/CLIN PHARMACIST DOCUMENTED: ICD-10-PCS | Mod: HCNC,CPTII,S$GLB, | Performed by: NURSE PRACTITIONER

## 2021-01-01 PROCEDURE — 99214 OFFICE O/P EST MOD 30 MIN: CPT | Mod: HCNC,S$GLB,, | Performed by: FAMILY MEDICINE

## 2021-01-01 PROCEDURE — 93297 REM INTERROG DEV EVAL ICPMS: CPT | Mod: ,,, | Performed by: INTERNAL MEDICINE

## 2021-01-01 PROCEDURE — 99999 PR PBB SHADOW E&M-EST. PATIENT-LVL IV: ICD-10-PCS | Mod: PBBFAC,,, | Performed by: NURSE PRACTITIONER

## 2021-01-01 PROCEDURE — 99999 PR PBB SHADOW E&M-EST. PATIENT-LVL V: ICD-10-PCS | Mod: PBBFAC,HCNC,, | Performed by: NURSE PRACTITIONER

## 2021-01-01 PROCEDURE — 1159F MED LIST DOCD IN RCRD: CPT | Mod: HCNC,CPTII,S$GLB, | Performed by: NURSE PRACTITIONER

## 2021-01-01 PROCEDURE — 93297 CARDIAC DEVICE CHECK - REMOTE: ICD-10-PCS | Mod: ,,, | Performed by: INTERNAL MEDICINE

## 2021-01-01 PROCEDURE — 1101F PT FALLS ASSESS-DOCD LE1/YR: CPT | Mod: HCNC,CPTII,S$GLB, | Performed by: NURSE PRACTITIONER

## 2021-01-01 PROCEDURE — 93297 REM INTERROG DEV EVAL ICPMS: CPT | Mod: HCNC,,, | Performed by: INTERNAL MEDICINE

## 2021-01-01 PROCEDURE — 1159F MED LIST DOCD IN RCRD: CPT | Mod: CPTII,S$GLB,, | Performed by: NURSE PRACTITIONER

## 2021-01-01 PROCEDURE — 99999 PR PBB SHADOW E&M-EST. PATIENT-LVL III: ICD-10-PCS | Mod: PBBFAC,HCNC,, | Performed by: NURSE PRACTITIONER

## 2021-01-01 PROCEDURE — 85025 COMPLETE CBC W/AUTO DIFF WBC: CPT | Mod: HCNC,PO | Performed by: FAMILY MEDICINE

## 2021-01-01 PROCEDURE — G0008 FLU VACCINE - QUADRIVALENT - ADJUVANTED: ICD-10-PCS | Mod: HCNC,S$GLB,, | Performed by: NURSE PRACTITIONER

## 2021-01-01 PROCEDURE — 93297 CARDIAC DEVICE CHECK - REMOTE: ICD-10-PCS | Mod: HCNC,,, | Performed by: INTERNAL MEDICINE

## 2021-01-01 PROCEDURE — G0008 ADMIN INFLUENZA VIRUS VAC: HCPCS | Mod: HCNC,S$GLB,, | Performed by: NURSE PRACTITIONER

## 2021-01-01 PROCEDURE — 1101F PR PT FALLS ASSESS DOC 0-1 FALLS W/OUT INJ PAST YR: ICD-10-PCS | Mod: HCNC,CPTII,S$GLB, | Performed by: NURSE PRACTITIONER

## 2021-01-01 PROCEDURE — 99999 PR PBB SHADOW E&M-EST. PATIENT-LVL V: ICD-10-PCS | Mod: PBBFAC,,, | Performed by: NURSE PRACTITIONER

## 2021-01-01 PROCEDURE — 1125F AMNT PAIN NOTED PAIN PRSNT: CPT | Mod: HCNC,CPTII,S$GLB, | Performed by: NURSE PRACTITIONER

## 2021-01-01 PROCEDURE — 99214 PR OFFICE/OUTPT VISIT, EST, LEVL IV, 30-39 MIN: ICD-10-PCS | Mod: 25,HCNC,S$GLB, | Performed by: NURSE PRACTITIONER

## 2021-01-01 PROCEDURE — 96372 PR INJECTION,THERAP/PROPH/DIAG2ST, IM OR SUBCUT: ICD-10-PCS | Mod: 59,HCNC,S$GLB, | Performed by: NURSE PRACTITIONER

## 2021-01-01 PROCEDURE — 99499 UNLISTED E&M SERVICE: CPT | Mod: HCNC,S$GLB,, | Performed by: FAMILY MEDICINE

## 2021-01-01 PROCEDURE — 1159F PR MEDICATION LIST DOCUMENTED IN MEDICAL RECORD: ICD-10-PCS | Mod: CPTII,S$GLB,, | Performed by: NURSE PRACTITIONER

## 2021-01-01 PROCEDURE — G2066 INTER DEVC REMOTE 30D: HCPCS | Mod: PO | Performed by: INTERNAL MEDICINE

## 2021-01-01 PROCEDURE — 80053 COMPREHEN METABOLIC PANEL: CPT | Mod: HCNC,PO | Performed by: FAMILY MEDICINE

## 2021-01-01 PROCEDURE — 99214 OFFICE O/P EST MOD 30 MIN: CPT | Mod: S$GLB,,, | Performed by: NURSE PRACTITIONER

## 2021-01-01 PROCEDURE — 99999 PR PBB SHADOW E&M-EST. PATIENT-LVL IV: CPT | Mod: PBBFAC,,, | Performed by: NURSE PRACTITIONER

## 2021-01-01 PROCEDURE — 90694 VACC AIIV4 NO PRSRV 0.5ML IM: CPT | Mod: HCNC,S$GLB,, | Performed by: NURSE PRACTITIONER

## 2021-01-01 PROCEDURE — 1126F PR PAIN SEVERITY QUANTIFIED, NO PAIN PRESENT: ICD-10-PCS | Mod: HCNC,CPTII,S$GLB, | Performed by: NURSE PRACTITIONER

## 2021-01-01 PROCEDURE — 99213 OFFICE O/P EST LOW 20 MIN: CPT | Mod: HCNC,S$GLB,, | Performed by: NURSE PRACTITIONER

## 2021-01-01 PROCEDURE — 99213 PR OFFICE/OUTPT VISIT, EST, LEVL III, 20-29 MIN: ICD-10-PCS | Mod: HCNC,S$GLB,, | Performed by: NURSE PRACTITIONER

## 2021-01-01 PROCEDURE — 3288F FALL RISK ASSESSMENT DOCD: CPT | Mod: HCNC,CPTII,S$GLB, | Performed by: NURSE PRACTITIONER

## 2021-01-01 PROCEDURE — 90694 FLU VACCINE - QUADRIVALENT - ADJUVANTED: ICD-10-PCS | Mod: HCNC,S$GLB,, | Performed by: NURSE PRACTITIONER

## 2021-01-01 PROCEDURE — 99214 PR OFFICE/OUTPT VISIT, EST, LEVL IV, 30-39 MIN: ICD-10-PCS | Mod: S$GLB,,, | Performed by: NURSE PRACTITIONER

## 2021-01-01 PROCEDURE — 1101F PT FALLS ASSESS-DOCD LE1/YR: CPT | Mod: CPTII,S$GLB,, | Performed by: NURSE PRACTITIONER

## 2021-01-01 PROCEDURE — 1160F RVW MEDS BY RX/DR IN RCRD: CPT | Mod: CPTII,S$GLB,, | Performed by: NURSE PRACTITIONER

## 2021-01-01 PROCEDURE — 99999 PR PBB SHADOW E&M-EST. PATIENT-LVL V: CPT | Mod: PBBFAC,HCNC,, | Performed by: NURSE PRACTITIONER

## 2021-01-01 PROCEDURE — 99999 PR PBB SHADOW E&M-EST. PATIENT-LVL IV: CPT | Mod: PBBFAC,HCNC,, | Performed by: FAMILY MEDICINE

## 2021-01-01 PROCEDURE — 1125F PR PAIN SEVERITY QUANTIFIED, PAIN PRESENT: ICD-10-PCS | Mod: HCNC,CPTII,S$GLB, | Performed by: NURSE PRACTITIONER

## 2021-01-01 PROCEDURE — 1101F PR PT FALLS ASSESS DOC 0-1 FALLS W/OUT INJ PAST YR: ICD-10-PCS | Mod: CPTII,S$GLB,, | Performed by: NURSE PRACTITIONER

## 2021-01-01 PROCEDURE — 1160F PR REVIEW ALL MEDS BY PRESCRIBER/CLIN PHARMACIST DOCUMENTED: ICD-10-PCS | Mod: CPTII,S$GLB,, | Performed by: NURSE PRACTITIONER

## 2021-01-01 PROCEDURE — 3288F PR FALLS RISK ASSESSMENT DOCUMENTED: ICD-10-PCS | Mod: HCNC,CPTII,S$GLB, | Performed by: NURSE PRACTITIONER

## 2021-01-01 PROCEDURE — 1159F PR MEDICATION LIST DOCUMENTED IN MEDICAL RECORD: ICD-10-PCS | Mod: HCNC,CPTII,S$GLB, | Performed by: NURSE PRACTITIONER

## 2021-01-01 PROCEDURE — 99214 PR OFFICE/OUTPT VISIT, EST, LEVL IV, 30-39 MIN: ICD-10-PCS | Mod: HCNC,S$GLB,, | Performed by: FAMILY MEDICINE

## 2021-01-01 PROCEDURE — 3288F FALL RISK ASSESSMENT DOCD: CPT | Mod: CPTII,S$GLB,, | Performed by: NURSE PRACTITIONER

## 2021-01-01 PROCEDURE — 99999 PR PBB SHADOW E&M-EST. PATIENT-LVL IV: ICD-10-PCS | Mod: PBBFAC,HCNC,, | Performed by: FAMILY MEDICINE

## 2021-01-01 PROCEDURE — 93296 REM INTERROG EVL PM/IDS: CPT | Mod: PO | Performed by: INTERNAL MEDICINE

## 2021-01-01 PROCEDURE — 36415 COLL VENOUS BLD VENIPUNCTURE: CPT | Mod: HCNC,PO | Performed by: FAMILY MEDICINE

## 2021-01-01 PROCEDURE — 99999 PR PBB SHADOW E&M-EST. PATIENT-LVL V: CPT | Mod: PBBFAC,,, | Performed by: NURSE PRACTITIONER

## 2021-01-01 PROCEDURE — 1126F AMNT PAIN NOTED NONE PRSNT: CPT | Mod: HCNC,CPTII,S$GLB, | Performed by: NURSE PRACTITIONER

## 2021-01-01 PROCEDURE — 99999 PR PBB SHADOW E&M-EST. PATIENT-LVL III: CPT | Mod: PBBFAC,HCNC,, | Performed by: NURSE PRACTITIONER

## 2021-01-01 RX ORDER — KETOROLAC TROMETHAMINE 30 MG/ML
15 INJECTION, SOLUTION INTRAMUSCULAR; INTRAVENOUS
Status: DISCONTINUED | OUTPATIENT
Start: 2021-01-01 | End: 2021-01-01

## 2021-01-01 RX ORDER — KETOROLAC TROMETHAMINE 30 MG/ML
30 INJECTION, SOLUTION INTRAMUSCULAR; INTRAVENOUS
Status: COMPLETED | OUTPATIENT
Start: 2021-01-01 | End: 2021-01-01

## 2021-01-01 RX ORDER — AZELASTINE 1 MG/ML
1 SPRAY, METERED NASAL 2 TIMES DAILY
Qty: 30 ML | Refills: 0 | Status: SHIPPED | OUTPATIENT
Start: 2021-01-01 | End: 2021-01-01

## 2021-01-01 RX ORDER — MIRTAZAPINE 15 MG/1
15 TABLET, FILM COATED ORAL NIGHTLY
Qty: 30 TABLET | Refills: 11 | Status: ON HOLD | OUTPATIENT
Start: 2021-01-01 | End: 2021-01-01 | Stop reason: CLARIF

## 2021-01-01 RX ORDER — MEGESTROL ACETATE 40 MG/1
40 TABLET ORAL DAILY
Qty: 30 TABLET | Refills: 2 | Status: SHIPPED | OUTPATIENT
Start: 2021-01-01 | End: 2022-01-01

## 2021-01-01 RX ORDER — FLUTICASONE PROPIONATE 50 MCG
1 SPRAY, SUSPENSION (ML) NASAL DAILY
Qty: 16 G | Refills: 0 | Status: SHIPPED | OUTPATIENT
Start: 2021-01-01 | End: 2021-01-01 | Stop reason: SDUPTHER

## 2021-01-01 RX ORDER — FLUTICASONE PROPIONATE 50 MCG
1 SPRAY, SUSPENSION (ML) NASAL DAILY
Qty: 16 G | Refills: 0 | Status: SHIPPED | OUTPATIENT
Start: 2021-01-01 | End: 2021-01-01

## 2021-01-01 RX ORDER — TEMAZEPAM 30 MG/1
CAPSULE ORAL
Qty: 30 CAPSULE | Refills: 2 | Status: SHIPPED | OUTPATIENT
Start: 2021-01-01 | End: 2022-01-01 | Stop reason: SDUPTHER

## 2021-01-01 RX ORDER — TEMAZEPAM 30 MG/1
CAPSULE ORAL
Qty: 30 CAPSULE | Refills: 5 | Status: ON HOLD | OUTPATIENT
Start: 2021-01-01 | End: 2021-01-01 | Stop reason: HOSPADM

## 2021-01-01 RX ADMIN — KETOROLAC TROMETHAMINE 30 MG: 30 INJECTION, SOLUTION INTRAMUSCULAR; INTRAVENOUS at 10:11

## 2021-01-13 ENCOUNTER — IMMUNIZATION (OUTPATIENT)
Dept: FAMILY MEDICINE | Facility: CLINIC | Age: 86
End: 2021-01-13
Payer: MEDICARE

## 2021-01-13 DIAGNOSIS — Z23 NEED FOR VACCINATION: ICD-10-CM

## 2021-01-13 PROCEDURE — 91300 COVID-19, MRNA, LNP-S, PF, 30 MCG/0.3 ML DOSE VACCINE: CPT | Mod: PBBFAC | Performed by: FAMILY MEDICINE

## 2021-01-19 ENCOUNTER — CLINICAL SUPPORT (OUTPATIENT)
Dept: CARDIOLOGY | Facility: CLINIC | Age: 86
End: 2021-01-19
Payer: MEDICARE

## 2021-01-19 DIAGNOSIS — Z95.810 PRESENCE OF AUTOMATIC (IMPLANTABLE) CARDIAC DEFIBRILLATOR: ICD-10-CM

## 2021-01-19 PROCEDURE — 93297 CARDIAC DEVICE CHECK - REMOTE: ICD-10-PCS | Mod: HCNC,S$GLB,, | Performed by: INTERNAL MEDICINE

## 2021-01-19 PROCEDURE — 93297 REM INTERROG DEV EVAL ICPMS: CPT | Mod: HCNC,S$GLB,, | Performed by: INTERNAL MEDICINE

## 2021-01-25 DIAGNOSIS — G47.00 INSOMNIA, UNSPECIFIED TYPE: ICD-10-CM

## 2021-01-25 RX ORDER — TEMAZEPAM 30 MG/1
CAPSULE ORAL
Qty: 30 CAPSULE | Refills: 5 | Status: SHIPPED | OUTPATIENT
Start: 2021-01-25 | End: 2021-01-01

## 2021-02-03 ENCOUNTER — IMMUNIZATION (OUTPATIENT)
Dept: FAMILY MEDICINE | Facility: CLINIC | Age: 86
End: 2021-02-03
Payer: MEDICARE

## 2021-02-03 DIAGNOSIS — Z23 NEED FOR VACCINATION: Primary | ICD-10-CM

## 2021-02-03 PROCEDURE — 0002A COVID-19, MRNA, LNP-S, PF, 30 MCG/0.3 ML DOSE VACCINE: CPT | Mod: PBBFAC | Performed by: FAMILY MEDICINE

## 2021-02-03 PROCEDURE — 91300 COVID-19, MRNA, LNP-S, PF, 30 MCG/0.3 ML DOSE VACCINE: CPT | Mod: PBBFAC | Performed by: FAMILY MEDICINE

## 2021-02-08 DIAGNOSIS — Z95.810 CARDIAC DEFIBRILLATOR IN SITU: Primary | ICD-10-CM

## 2021-02-08 DIAGNOSIS — I25.5 ISCHEMIC CARDIOMYOPATHY: ICD-10-CM

## 2021-02-21 ENCOUNTER — CLINICAL SUPPORT (OUTPATIENT)
Dept: CARDIOLOGY | Facility: CLINIC | Age: 86
End: 2021-02-21
Payer: MEDICARE

## 2021-02-24 ENCOUNTER — TELEPHONE (OUTPATIENT)
Dept: CARDIOLOGY | Facility: CLINIC | Age: 86
End: 2021-02-24

## 2021-03-19 ENCOUNTER — CLINICAL SUPPORT (OUTPATIENT)
Dept: CARDIOLOGY | Facility: CLINIC | Age: 86
End: 2021-03-19
Payer: MEDICARE

## 2021-03-19 DIAGNOSIS — Z95.810 PRESENCE OF AUTOMATIC (IMPLANTABLE) CARDIAC DEFIBRILLATOR: ICD-10-CM

## 2021-03-19 PROBLEM — Z71.89 ACP (ADVANCE CARE PLANNING): Status: ACTIVE | Noted: 2021-03-19

## 2021-03-19 PROBLEM — R42 DIZZINESS: Status: ACTIVE | Noted: 2021-03-19

## 2021-03-20 PROBLEM — E53.8 B12 DEFICIENCY: Status: ACTIVE | Noted: 2021-03-20

## 2021-03-20 PROBLEM — E11.42 DIABETIC PERIPHERAL NEUROPATHY: Status: ACTIVE | Noted: 2021-03-20

## 2021-03-20 PROBLEM — I95.1 ORTHOSTATIC HYPOTENSION: Status: ACTIVE | Noted: 2021-03-19

## 2021-03-23 ENCOUNTER — CLINICAL SUPPORT (OUTPATIENT)
Dept: CARDIOLOGY | Facility: CLINIC | Age: 86
End: 2021-03-23
Payer: MEDICARE

## 2021-03-23 DIAGNOSIS — Z95.810 PRESENCE OF AUTOMATIC (IMPLANTABLE) CARDIAC DEFIBRILLATOR: ICD-10-CM

## 2021-03-23 PROCEDURE — 93297 REM INTERROG DEV EVAL ICPMS: CPT | Mod: S$GLB,,, | Performed by: INTERNAL MEDICINE

## 2021-03-23 PROCEDURE — 93297 CARDIAC DEVICE CHECK - REMOTE: ICD-10-PCS | Mod: S$GLB,,, | Performed by: INTERNAL MEDICINE

## 2021-04-22 ENCOUNTER — CLINICAL SUPPORT (OUTPATIENT)
Dept: CARDIOLOGY | Facility: CLINIC | Age: 86
End: 2021-04-22
Payer: MEDICARE

## 2021-04-22 DIAGNOSIS — Z95.810 PRESENCE OF AUTOMATIC (IMPLANTABLE) CARDIAC DEFIBRILLATOR: ICD-10-CM

## 2021-04-22 PROCEDURE — 93297 REM INTERROG DEV EVAL ICPMS: CPT | Mod: HCNC,S$GLB,, | Performed by: INTERNAL MEDICINE

## 2021-04-22 PROCEDURE — 93297 CARDIAC DEVICE CHECK - REMOTE: ICD-10-PCS | Mod: HCNC,S$GLB,, | Performed by: INTERNAL MEDICINE

## 2021-05-02 ENCOUNTER — PATIENT OUTREACH (OUTPATIENT)
Dept: ADMINISTRATIVE | Facility: OTHER | Age: 86
End: 2021-05-02

## 2021-05-04 ENCOUNTER — OFFICE VISIT (OUTPATIENT)
Dept: OPHTHALMOLOGY | Facility: CLINIC | Age: 86
End: 2021-05-04
Payer: MEDICARE

## 2021-05-04 DIAGNOSIS — H02.135 SENILE ECTROPION OF BOTH LOWER EYELIDS: ICD-10-CM

## 2021-05-04 DIAGNOSIS — H02.132 SENILE ECTROPION OF BOTH LOWER EYELIDS: ICD-10-CM

## 2021-05-04 DIAGNOSIS — E11.9 DIABETES MELLITUS TYPE 2 WITHOUT RETINOPATHY: Primary | ICD-10-CM

## 2021-05-04 DIAGNOSIS — Z96.1 PSEUDOPHAKIA, BOTH EYES: ICD-10-CM

## 2021-05-04 DIAGNOSIS — Z94.7 S/P PKP (PENETRATING KERATOPLASTY): ICD-10-CM

## 2021-05-04 PROCEDURE — 3288F PR FALLS RISK ASSESSMENT DOCUMENTED: ICD-10-PCS | Mod: CPTII,S$GLB,, | Performed by: OPHTHALMOLOGY

## 2021-05-04 PROCEDURE — 1126F PR PAIN SEVERITY QUANTIFIED, NO PAIN PRESENT: ICD-10-PCS | Mod: S$GLB,,, | Performed by: OPHTHALMOLOGY

## 2021-05-04 PROCEDURE — 2023F DILAT RTA XM W/O RTNOPTHY: CPT | Mod: S$GLB,,, | Performed by: OPHTHALMOLOGY

## 2021-05-04 PROCEDURE — 1126F AMNT PAIN NOTED NONE PRSNT: CPT | Mod: S$GLB,,, | Performed by: OPHTHALMOLOGY

## 2021-05-04 PROCEDURE — 1101F PT FALLS ASSESS-DOCD LE1/YR: CPT | Mod: CPTII,S$GLB,, | Performed by: OPHTHALMOLOGY

## 2021-05-04 PROCEDURE — 92004 PR EYE EXAM, NEW PATIENT,COMPREHESV: ICD-10-PCS | Mod: S$GLB,,, | Performed by: OPHTHALMOLOGY

## 2021-05-04 PROCEDURE — 99999 PR PBB SHADOW E&M-EST. PATIENT-LVL III: CPT | Mod: PBBFAC,,, | Performed by: OPHTHALMOLOGY

## 2021-05-04 PROCEDURE — 99499 UNLISTED E&M SERVICE: CPT | Mod: HCNC,S$GLB,, | Performed by: OPHTHALMOLOGY

## 2021-05-04 PROCEDURE — 3288F FALL RISK ASSESSMENT DOCD: CPT | Mod: CPTII,S$GLB,, | Performed by: OPHTHALMOLOGY

## 2021-05-04 PROCEDURE — 99999 PR PBB SHADOW E&M-EST. PATIENT-LVL III: ICD-10-PCS | Mod: PBBFAC,,, | Performed by: OPHTHALMOLOGY

## 2021-05-04 PROCEDURE — 99499 RISK ADDL DX/OHS AUDIT: ICD-10-PCS | Mod: HCNC,S$GLB,, | Performed by: OPHTHALMOLOGY

## 2021-05-04 PROCEDURE — 92004 COMPRE OPH EXAM NEW PT 1/>: CPT | Mod: S$GLB,,, | Performed by: OPHTHALMOLOGY

## 2021-05-04 PROCEDURE — 1101F PR PT FALLS ASSESS DOC 0-1 FALLS W/OUT INJ PAST YR: ICD-10-PCS | Mod: CPTII,S$GLB,, | Performed by: OPHTHALMOLOGY

## 2021-05-04 PROCEDURE — 2023F PR DILATED RETINAL EXAM W/O EVID OF RETINOPATHY: ICD-10-PCS | Mod: S$GLB,,, | Performed by: OPHTHALMOLOGY

## 2021-05-06 ENCOUNTER — HOSPITAL ENCOUNTER (OUTPATIENT)
Dept: CARDIOLOGY | Facility: HOSPITAL | Age: 86
Discharge: HOME | End: 2021-05-06
Attending: INTERNAL MEDICINE
Payer: MEDICARE

## 2021-05-06 ENCOUNTER — CLINICAL SUPPORT (OUTPATIENT)
Dept: AUDIOLOGY | Facility: CLINIC | Age: 86
End: 2021-05-06
Payer: MEDICARE

## 2021-05-06 ENCOUNTER — OFFICE VISIT (OUTPATIENT)
Dept: OTOLARYNGOLOGY | Facility: CLINIC | Age: 86
End: 2021-05-06
Payer: MEDICARE

## 2021-05-06 VITALS — WEIGHT: 143.06 LBS | HEIGHT: 69 IN | BODY MASS INDEX: 21.19 KG/M2

## 2021-05-06 DIAGNOSIS — H91.90 HEARING DIFFICULTY, UNSPECIFIED LATERALITY: Primary | ICD-10-CM

## 2021-05-06 DIAGNOSIS — H90.3 BILATERAL SENSORINEURAL HEARING LOSS: ICD-10-CM

## 2021-05-06 DIAGNOSIS — Z95.810 CARDIAC DEFIBRILLATOR IN SITU: ICD-10-CM

## 2021-05-06 DIAGNOSIS — H93.293 IMPAIRED AUDITORY DISCRIMINATION, BILATERAL: Primary | ICD-10-CM

## 2021-05-06 DIAGNOSIS — I25.5 ISCHEMIC CARDIOMYOPATHY: ICD-10-CM

## 2021-05-06 PROCEDURE — 99999 PR PBB SHADOW E&M-EST. PATIENT-LVL II: CPT | Mod: PBBFAC,,,

## 2021-05-06 PROCEDURE — 99999 PR PBB SHADOW E&M-EST. PATIENT-LVL II: ICD-10-PCS | Mod: PBBFAC,,,

## 2021-05-06 PROCEDURE — 93284 CARDIAC DEVICE CHECK - IN CLINIC & HOSPITAL: ICD-10-PCS | Mod: 26,,, | Performed by: INTERNAL MEDICINE

## 2021-05-06 PROCEDURE — 1101F PR PT FALLS ASSESS DOC 0-1 FALLS W/OUT INJ PAST YR: ICD-10-PCS | Mod: CPTII,S$GLB,, | Performed by: NURSE PRACTITIONER

## 2021-05-06 PROCEDURE — 99203 PR OFFICE/OUTPT VISIT, NEW, LEVL III, 30-44 MIN: ICD-10-PCS | Mod: S$GLB,,, | Performed by: NURSE PRACTITIONER

## 2021-05-06 PROCEDURE — 99203 OFFICE O/P NEW LOW 30 MIN: CPT | Mod: S$GLB,,, | Performed by: NURSE PRACTITIONER

## 2021-05-06 PROCEDURE — 92557 COMPREHENSIVE HEARING TEST: CPT | Mod: S$GLB,,, | Performed by: AUDIOLOGIST-HEARING AID FITTER

## 2021-05-06 PROCEDURE — 1101F PT FALLS ASSESS-DOCD LE1/YR: CPT | Mod: CPTII,S$GLB,, | Performed by: NURSE PRACTITIONER

## 2021-05-06 PROCEDURE — 1126F PR PAIN SEVERITY QUANTIFIED, NO PAIN PRESENT: ICD-10-PCS | Mod: S$GLB,,, | Performed by: NURSE PRACTITIONER

## 2021-05-06 PROCEDURE — 92557 PR COMPREHENSIVE HEARING TEST: ICD-10-PCS | Mod: S$GLB,,, | Performed by: AUDIOLOGIST-HEARING AID FITTER

## 2021-05-06 PROCEDURE — 1159F MED LIST DOCD IN RCRD: CPT | Mod: S$GLB,,, | Performed by: NURSE PRACTITIONER

## 2021-05-06 PROCEDURE — 92567 TYMPANOMETRY: CPT | Mod: S$GLB,,, | Performed by: AUDIOLOGIST-HEARING AID FITTER

## 2021-05-06 PROCEDURE — 99999 PR PBB SHADOW E&M-EST. PATIENT-LVL III: ICD-10-PCS | Mod: PBBFAC,,, | Performed by: NURSE PRACTITIONER

## 2021-05-06 PROCEDURE — 1126F AMNT PAIN NOTED NONE PRSNT: CPT | Mod: S$GLB,,, | Performed by: NURSE PRACTITIONER

## 2021-05-06 PROCEDURE — 3288F PR FALLS RISK ASSESSMENT DOCUMENTED: ICD-10-PCS | Mod: CPTII,S$GLB,, | Performed by: NURSE PRACTITIONER

## 2021-05-06 PROCEDURE — 99999 PR PBB SHADOW E&M-EST. PATIENT-LVL III: CPT | Mod: PBBFAC,,, | Performed by: NURSE PRACTITIONER

## 2021-05-06 PROCEDURE — 93284 PRGRMG EVAL IMPLANTABLE DFB: CPT | Mod: 26,,, | Performed by: INTERNAL MEDICINE

## 2021-05-06 PROCEDURE — 3288F FALL RISK ASSESSMENT DOCD: CPT | Mod: CPTII,S$GLB,, | Performed by: NURSE PRACTITIONER

## 2021-05-06 PROCEDURE — 92567 PR TYMPA2METRY: ICD-10-PCS | Mod: S$GLB,,, | Performed by: AUDIOLOGIST-HEARING AID FITTER

## 2021-05-06 PROCEDURE — 1159F PR MEDICATION LIST DOCUMENTED IN MEDICAL RECORD: ICD-10-PCS | Mod: S$GLB,,, | Performed by: NURSE PRACTITIONER

## 2021-05-11 ENCOUNTER — TELEPHONE (OUTPATIENT)
Dept: DERMATOLOGY | Facility: CLINIC | Age: 86
End: 2021-05-11

## 2021-05-13 ENCOUNTER — OFFICE VISIT (OUTPATIENT)
Dept: DERMATOLOGY | Facility: CLINIC | Age: 86
End: 2021-05-13
Payer: MEDICARE

## 2021-05-13 DIAGNOSIS — L21.9 SEBORRHEIC DERMATITIS OF SCALP: ICD-10-CM

## 2021-05-13 DIAGNOSIS — D48.5 NEOPLASM OF UNCERTAIN BEHAVIOR OF SKIN: Primary | ICD-10-CM

## 2021-05-13 DIAGNOSIS — Z85.828 HISTORY OF NONMELANOMA SKIN CANCER: ICD-10-CM

## 2021-05-13 DIAGNOSIS — L57.0 ACTINIC KERATOSES: ICD-10-CM

## 2021-05-13 DIAGNOSIS — L82.1 SEBORRHEIC KERATOSES: ICD-10-CM

## 2021-05-13 DIAGNOSIS — L90.5 SCAR: ICD-10-CM

## 2021-05-13 PROCEDURE — 17000 DESTRUCT PREMALG LESION: CPT | Mod: 59,S$GLB,, | Performed by: DERMATOLOGY

## 2021-05-13 PROCEDURE — 88305 TISSUE EXAM BY PATHOLOGIST: CPT | Mod: 26,,, | Performed by: PATHOLOGY

## 2021-05-13 PROCEDURE — 11102 TANGNTL BX SKIN SINGLE LES: CPT | Mod: S$GLB,,, | Performed by: DERMATOLOGY

## 2021-05-13 PROCEDURE — 17000 PR DESTRUCTION(LASER SURGERY,CRYOSURGERY,CHEMOSURGERY),PREMALIGNANT LESIONS,FIRST LESION: ICD-10-PCS | Mod: 59,S$GLB,, | Performed by: DERMATOLOGY

## 2021-05-13 PROCEDURE — 88305 TISSUE EXAM BY PATHOLOGIST: ICD-10-PCS | Mod: 26,,, | Performed by: PATHOLOGY

## 2021-05-13 PROCEDURE — 88305 TISSUE EXAM BY PATHOLOGIST: CPT | Performed by: PATHOLOGY

## 2021-05-13 PROCEDURE — 11102 PR TANGENTIAL BIOPSY, SKIN, SINGLE LESION: ICD-10-PCS | Mod: S$GLB,,, | Performed by: DERMATOLOGY

## 2021-05-13 PROCEDURE — 99213 PR OFFICE/OUTPT VISIT, EST, LEVL III, 20-29 MIN: ICD-10-PCS | Mod: 25,S$GLB,, | Performed by: DERMATOLOGY

## 2021-05-13 PROCEDURE — 99213 OFFICE O/P EST LOW 20 MIN: CPT | Mod: 25,S$GLB,, | Performed by: DERMATOLOGY

## 2021-05-13 PROCEDURE — 99999 PR PBB SHADOW E&M-EST. PATIENT-LVL III: ICD-10-PCS | Mod: PBBFAC,,, | Performed by: DERMATOLOGY

## 2021-05-13 PROCEDURE — 1159F PR MEDICATION LIST DOCUMENTED IN MEDICAL RECORD: ICD-10-PCS | Mod: S$GLB,,, | Performed by: DERMATOLOGY

## 2021-05-13 PROCEDURE — 99999 PR PBB SHADOW E&M-EST. PATIENT-LVL III: CPT | Mod: PBBFAC,,, | Performed by: DERMATOLOGY

## 2021-05-13 PROCEDURE — 11103 TANGNTL BX SKIN EA SEP/ADDL: CPT | Mod: S$GLB,,, | Performed by: DERMATOLOGY

## 2021-05-13 PROCEDURE — 17003 DESTRUCT PREMALG LES 2-14: CPT | Mod: S$GLB,,, | Performed by: DERMATOLOGY

## 2021-05-13 PROCEDURE — 17003 DESTRUCTION, PREMALIGNANT LESIONS; SECOND THROUGH 14 LESIONS: ICD-10-PCS | Mod: S$GLB,,, | Performed by: DERMATOLOGY

## 2021-05-13 PROCEDURE — 11103 PR TANGENTIAL BIOPSY, SKIN, EA ADDTL LESION: ICD-10-PCS | Mod: S$GLB,,, | Performed by: DERMATOLOGY

## 2021-05-13 PROCEDURE — 1159F MED LIST DOCD IN RCRD: CPT | Mod: S$GLB,,, | Performed by: DERMATOLOGY

## 2021-05-13 RX ORDER — KETOCONAZOLE 20 MG/ML
SHAMPOO, SUSPENSION TOPICAL
Qty: 120 ML | Refills: 5 | Status: SHIPPED | OUTPATIENT
Start: 2021-05-13

## 2021-05-17 LAB
FINAL PATHOLOGIC DIAGNOSIS: NORMAL
GROSS: NORMAL
Lab: NORMAL
MICROSCOPIC EXAM: NORMAL

## 2021-05-19 ENCOUNTER — TELEPHONE (OUTPATIENT)
Dept: DERMATOLOGY | Facility: CLINIC | Age: 86
End: 2021-05-19

## 2021-05-26 ENCOUNTER — HOSPITAL ENCOUNTER (OUTPATIENT)
Dept: CARDIOLOGY | Facility: HOSPITAL | Age: 86
Discharge: HOME | End: 2021-05-26
Payer: MEDICARE

## 2021-05-26 ENCOUNTER — CLINICAL SUPPORT (OUTPATIENT)
Dept: CARDIOLOGY | Facility: HOSPITAL | Age: 86
End: 2021-05-26
Payer: MEDICARE

## 2021-05-26 DIAGNOSIS — Z95.810 PRESENCE OF AUTOMATIC (IMPLANTABLE) CARDIAC DEFIBRILLATOR: ICD-10-CM

## 2021-05-26 PROCEDURE — 93297 CARDIAC DEVICE CHECK - REMOTE: ICD-10-PCS | Mod: ,,, | Performed by: INTERNAL MEDICINE

## 2021-05-26 PROCEDURE — G2066 INTER DEVC REMOTE 30D: HCPCS | Mod: PO | Performed by: INTERNAL MEDICINE

## 2021-05-26 PROCEDURE — 93297 REM INTERROG DEV EVAL ICPMS: CPT | Mod: ,,, | Performed by: INTERNAL MEDICINE

## 2021-06-14 ENCOUNTER — OFFICE VISIT (OUTPATIENT)
Dept: FAMILY MEDICINE | Facility: CLINIC | Age: 86
End: 2021-06-14
Payer: MEDICARE

## 2021-06-14 VITALS
OXYGEN SATURATION: 99 % | RESPIRATION RATE: 18 BRPM | DIASTOLIC BLOOD PRESSURE: 70 MMHG | SYSTOLIC BLOOD PRESSURE: 132 MMHG | HEIGHT: 69 IN | BODY MASS INDEX: 22.21 KG/M2 | TEMPERATURE: 98 F | WEIGHT: 149.94 LBS | HEART RATE: 71 BPM

## 2021-06-14 DIAGNOSIS — E11.42 DIABETIC PERIPHERAL NEUROPATHY: ICD-10-CM

## 2021-06-14 DIAGNOSIS — E78.5 HYPERLIPIDEMIA, UNSPECIFIED HYPERLIPIDEMIA TYPE: ICD-10-CM

## 2021-06-14 DIAGNOSIS — E11.9 TYPE 2 DIABETES MELLITUS WITHOUT COMPLICATION, WITHOUT LONG-TERM CURRENT USE OF INSULIN: Primary | ICD-10-CM

## 2021-06-14 DIAGNOSIS — I25.10 CORONARY ARTERY DISEASE INVOLVING NATIVE CORONARY ARTERY WITHOUT ANGINA PECTORIS, UNSPECIFIED WHETHER NATIVE OR TRANSPLANTED HEART: ICD-10-CM

## 2021-06-14 PROCEDURE — 1101F PT FALLS ASSESS-DOCD LE1/YR: CPT | Mod: CPTII,S$GLB,, | Performed by: FAMILY MEDICINE

## 2021-06-14 PROCEDURE — 99999 PR PBB SHADOW E&M-EST. PATIENT-LVL III: ICD-10-PCS | Mod: PBBFAC,,, | Performed by: FAMILY MEDICINE

## 2021-06-14 PROCEDURE — 99499 RISK ADDL DX/OHS AUDIT: ICD-10-PCS | Mod: HCNC,S$GLB,, | Performed by: FAMILY MEDICINE

## 2021-06-14 PROCEDURE — 3288F FALL RISK ASSESSMENT DOCD: CPT | Mod: CPTII,S$GLB,, | Performed by: FAMILY MEDICINE

## 2021-06-14 PROCEDURE — 1101F PR PT FALLS ASSESS DOC 0-1 FALLS W/OUT INJ PAST YR: ICD-10-PCS | Mod: CPTII,S$GLB,, | Performed by: FAMILY MEDICINE

## 2021-06-14 PROCEDURE — 99999 PR PBB SHADOW E&M-EST. PATIENT-LVL III: CPT | Mod: PBBFAC,,, | Performed by: FAMILY MEDICINE

## 2021-06-14 PROCEDURE — 1126F AMNT PAIN NOTED NONE PRSNT: CPT | Mod: S$GLB,,, | Performed by: FAMILY MEDICINE

## 2021-06-14 PROCEDURE — 1126F PR PAIN SEVERITY QUANTIFIED, NO PAIN PRESENT: ICD-10-PCS | Mod: S$GLB,,, | Performed by: FAMILY MEDICINE

## 2021-06-14 PROCEDURE — 99499 UNLISTED E&M SERVICE: CPT | Mod: HCNC,S$GLB,, | Performed by: FAMILY MEDICINE

## 2021-06-14 PROCEDURE — 99214 OFFICE O/P EST MOD 30 MIN: CPT | Mod: S$GLB,,, | Performed by: FAMILY MEDICINE

## 2021-06-14 PROCEDURE — 1159F PR MEDICATION LIST DOCUMENTED IN MEDICAL RECORD: ICD-10-PCS | Mod: S$GLB,,, | Performed by: FAMILY MEDICINE

## 2021-06-14 PROCEDURE — 1159F MED LIST DOCD IN RCRD: CPT | Mod: S$GLB,,, | Performed by: FAMILY MEDICINE

## 2021-06-14 PROCEDURE — 3288F PR FALLS RISK ASSESSMENT DOCUMENTED: ICD-10-PCS | Mod: CPTII,S$GLB,, | Performed by: FAMILY MEDICINE

## 2021-06-14 PROCEDURE — 99214 PR OFFICE/OUTPT VISIT, EST, LEVL IV, 30-39 MIN: ICD-10-PCS | Mod: S$GLB,,, | Performed by: FAMILY MEDICINE

## 2021-06-14 RX ORDER — ROSUVASTATIN CALCIUM 10 MG/1
10 TABLET, COATED ORAL DAILY
Qty: 90 TABLET | Refills: 3 | Status: SHIPPED | OUTPATIENT
Start: 2021-06-14 | End: 2022-01-01

## 2021-06-17 ENCOUNTER — CLINICAL SUPPORT (OUTPATIENT)
Dept: CARDIOLOGY | Facility: HOSPITAL | Age: 86
End: 2021-06-17
Payer: MEDICARE

## 2021-06-17 DIAGNOSIS — Z95.810 PRESENCE OF AUTOMATIC (IMPLANTABLE) CARDIAC DEFIBRILLATOR: ICD-10-CM

## 2021-06-17 PROCEDURE — 93296 REM INTERROG EVL PM/IDS: CPT | Mod: PO | Performed by: INTERNAL MEDICINE

## 2021-12-02 PROBLEM — I50.32 CHRONIC DIASTOLIC HEART FAILURE: Status: ACTIVE | Noted: 2021-01-01

## 2021-12-02 PROBLEM — D64.9 SYMPTOMATIC ANEMIA: Status: ACTIVE | Noted: 2021-01-01

## 2021-12-15 PROBLEM — N18.31 STAGE 3A CHRONIC KIDNEY DISEASE: Status: ACTIVE | Noted: 2021-01-01

## 2022-01-01 ENCOUNTER — TELEPHONE (OUTPATIENT)
Dept: HEMATOLOGY/ONCOLOGY | Facility: CLINIC | Age: 87
End: 2022-01-01
Payer: MEDICARE

## 2022-01-01 ENCOUNTER — TELEPHONE (OUTPATIENT)
Dept: FAMILY MEDICINE | Facility: CLINIC | Age: 87
End: 2022-01-01
Payer: MEDICARE

## 2022-01-01 ENCOUNTER — TELEPHONE (OUTPATIENT)
Dept: CARDIOLOGY | Facility: HOSPITAL | Age: 87
End: 2022-01-01
Payer: MEDICARE

## 2022-01-01 ENCOUNTER — LAB VISIT (OUTPATIENT)
Dept: LAB | Facility: HOSPITAL | Age: 87
End: 2022-01-01
Attending: STUDENT IN AN ORGANIZED HEALTH CARE EDUCATION/TRAINING PROGRAM
Payer: MEDICARE

## 2022-01-01 ENCOUNTER — CLINICAL SUPPORT (OUTPATIENT)
Dept: CARDIOLOGY | Facility: HOSPITAL | Age: 87
End: 2022-01-01
Payer: MEDICARE

## 2022-01-01 ENCOUNTER — INFUSION (OUTPATIENT)
Dept: INFUSION THERAPY | Facility: HOSPITAL | Age: 87
End: 2022-01-01
Attending: STUDENT IN AN ORGANIZED HEALTH CARE EDUCATION/TRAINING PROGRAM
Payer: MEDICARE

## 2022-01-01 ENCOUNTER — HOSPITAL ENCOUNTER (OUTPATIENT)
Dept: RADIOLOGY | Facility: HOSPITAL | Age: 87
Discharge: HOME | End: 2022-02-01
Attending: NURSE PRACTITIONER
Payer: MEDICARE

## 2022-01-01 ENCOUNTER — OFFICE VISIT (OUTPATIENT)
Dept: UROLOGY | Facility: CLINIC | Age: 87
End: 2022-01-01
Payer: MEDICARE

## 2022-01-01 ENCOUNTER — OFFICE VISIT (OUTPATIENT)
Dept: FAMILY MEDICINE | Facility: CLINIC | Age: 87
End: 2022-01-01
Payer: MEDICARE

## 2022-01-01 ENCOUNTER — HOSPITAL ENCOUNTER (OUTPATIENT)
Dept: RADIOLOGY | Facility: HOSPITAL | Age: 87
Discharge: HOME | End: 2022-02-04
Attending: NURSE PRACTITIONER
Payer: MEDICARE

## 2022-01-01 ENCOUNTER — PATIENT OUTREACH (OUTPATIENT)
Dept: ADMINISTRATIVE | Facility: OTHER | Age: 87
End: 2022-01-01
Payer: MEDICARE

## 2022-01-01 ENCOUNTER — OFFICE VISIT (OUTPATIENT)
Dept: HEMATOLOGY/ONCOLOGY | Facility: CLINIC | Age: 87
End: 2022-01-01
Payer: MEDICARE

## 2022-01-01 ENCOUNTER — TELEPHONE (OUTPATIENT)
Dept: INFUSION THERAPY | Facility: HOSPITAL | Age: 87
End: 2022-01-01
Payer: MEDICARE

## 2022-01-01 ENCOUNTER — TELEPHONE (OUTPATIENT)
Dept: UROLOGY | Facility: CLINIC | Age: 87
End: 2022-01-01

## 2022-01-01 VITALS
HEART RATE: 80 BPM | SYSTOLIC BLOOD PRESSURE: 135 MMHG | RESPIRATION RATE: 18 BRPM | TEMPERATURE: 98 F | DIASTOLIC BLOOD PRESSURE: 67 MMHG

## 2022-01-01 VITALS
WEIGHT: 135.75 LBS | BODY MASS INDEX: 19.44 KG/M2 | RESPIRATION RATE: 17 BRPM | SYSTOLIC BLOOD PRESSURE: 106 MMHG | OXYGEN SATURATION: 98 % | DIASTOLIC BLOOD PRESSURE: 64 MMHG | TEMPERATURE: 96 F | HEART RATE: 86 BPM | HEIGHT: 70 IN

## 2022-01-01 VITALS
SYSTOLIC BLOOD PRESSURE: 100 MMHG | RESPIRATION RATE: 18 BRPM | BODY MASS INDEX: 19.57 KG/M2 | WEIGHT: 136.25 LBS | HEART RATE: 84 BPM | DIASTOLIC BLOOD PRESSURE: 40 MMHG | BODY MASS INDEX: 19.51 KG/M2 | OXYGEN SATURATION: 96 % | HEIGHT: 70 IN | HEIGHT: 70 IN | WEIGHT: 136.69 LBS

## 2022-01-01 VITALS
TEMPERATURE: 99 F | OXYGEN SATURATION: 100 % | SYSTOLIC BLOOD PRESSURE: 134 MMHG | WEIGHT: 137.81 LBS | DIASTOLIC BLOOD PRESSURE: 68 MMHG | BODY MASS INDEX: 19.73 KG/M2 | HEIGHT: 70 IN | HEART RATE: 82 BPM

## 2022-01-01 VITALS
BODY MASS INDEX: 19.33 KG/M2 | HEART RATE: 102 BPM | TEMPERATURE: 98 F | RESPIRATION RATE: 18 BRPM | DIASTOLIC BLOOD PRESSURE: 70 MMHG | WEIGHT: 135 LBS | OXYGEN SATURATION: 97 % | SYSTOLIC BLOOD PRESSURE: 118 MMHG | HEIGHT: 70 IN

## 2022-01-01 DIAGNOSIS — D51.3 OTHER DIETARY VITAMIN B12 DEFICIENCY ANEMIA: ICD-10-CM

## 2022-01-01 DIAGNOSIS — R63.0 DECREASED APPETITE: ICD-10-CM

## 2022-01-01 DIAGNOSIS — R33.9 URINARY RETENTION: ICD-10-CM

## 2022-01-01 DIAGNOSIS — N32.81 OVERACTIVE BLADDER: ICD-10-CM

## 2022-01-01 DIAGNOSIS — J43.8 OTHER EMPHYSEMA: Primary | ICD-10-CM

## 2022-01-01 DIAGNOSIS — I50.32 CHRONIC DIASTOLIC HEART FAILURE: ICD-10-CM

## 2022-01-01 DIAGNOSIS — E11.42 DIABETIC PERIPHERAL NEUROPATHY: ICD-10-CM

## 2022-01-01 DIAGNOSIS — D64.9 SYMPTOMATIC ANEMIA: Primary | ICD-10-CM

## 2022-01-01 DIAGNOSIS — N21.0 BLADDER STONE: ICD-10-CM

## 2022-01-01 DIAGNOSIS — E11.9 TYPE 2 DIABETES MELLITUS WITHOUT COMPLICATION, WITHOUT LONG-TERM CURRENT USE OF INSULIN: ICD-10-CM

## 2022-01-01 DIAGNOSIS — R10.2 SUPRAPUBIC PAIN: Primary | ICD-10-CM

## 2022-01-01 DIAGNOSIS — N18.31 STAGE 3A CHRONIC KIDNEY DISEASE: ICD-10-CM

## 2022-01-01 DIAGNOSIS — J43.8 OTHER EMPHYSEMA: ICD-10-CM

## 2022-01-01 DIAGNOSIS — I25.5 ISCHEMIC CARDIOMYOPATHY: ICD-10-CM

## 2022-01-01 DIAGNOSIS — N40.0 ENLARGED PROSTATE: Primary | ICD-10-CM

## 2022-01-01 DIAGNOSIS — N40.1 ENLARGED PROSTATE WITH URINARY OBSTRUCTION: Primary | ICD-10-CM

## 2022-01-01 DIAGNOSIS — D64.9 SYMPTOMATIC ANEMIA: ICD-10-CM

## 2022-01-01 DIAGNOSIS — R33.9 URINARY RETENTION WITH INCOMPLETE BLADDER EMPTYING: ICD-10-CM

## 2022-01-01 DIAGNOSIS — R10.2 SUPRAPUBIC PAIN: ICD-10-CM

## 2022-01-01 DIAGNOSIS — R63.4 WEIGHT LOSS: ICD-10-CM

## 2022-01-01 DIAGNOSIS — D64.9 SEVERE ANEMIA: Primary | ICD-10-CM

## 2022-01-01 DIAGNOSIS — G47.00 INSOMNIA, UNSPECIFIED TYPE: ICD-10-CM

## 2022-01-01 DIAGNOSIS — R63.4 WEIGHT LOSS: Primary | ICD-10-CM

## 2022-01-01 DIAGNOSIS — N13.8 ENLARGED PROSTATE WITH URINARY OBSTRUCTION: Primary | ICD-10-CM

## 2022-01-01 DIAGNOSIS — S51.012A SKIN TEAR OF ELBOW WITHOUT COMPLICATION, LEFT, INITIAL ENCOUNTER: ICD-10-CM

## 2022-01-01 DIAGNOSIS — N18.31 STAGE 3A CHRONIC KIDNEY DISEASE: Primary | ICD-10-CM

## 2022-01-01 DIAGNOSIS — M17.11 PRIMARY OSTEOARTHRITIS OF RIGHT KNEE: ICD-10-CM

## 2022-01-01 DIAGNOSIS — N32.81 OVERACTIVE BLADDER: Primary | ICD-10-CM

## 2022-01-01 DIAGNOSIS — M25.562 KNEE PAIN, LEFT: Primary | ICD-10-CM

## 2022-01-01 DIAGNOSIS — Z02.9 ADMINISTRATIVE ENCOUNTER: ICD-10-CM

## 2022-01-01 DIAGNOSIS — Z95.810 PRESENCE OF AUTOMATIC (IMPLANTABLE) CARDIAC DEFIBRILLATOR: ICD-10-CM

## 2022-01-01 LAB
ABO + RH BLD: NORMAL
BASOPHILS # BLD AUTO: 0.05 K/UL (ref 0–0.2)
BASOPHILS NFR BLD: 0.5 % (ref 0–1.9)
BILIRUB UR QL STRIP: NEGATIVE
BLD GP AB SCN CELLS X3 SERPL QL: NORMAL
CLARITY UR: CLEAR
COLOR UR: YELLOW
DIFFERENTIAL METHOD: ABNORMAL
EOSINOPHIL # BLD AUTO: 0.2 K/UL (ref 0–0.5)
EOSINOPHIL NFR BLD: 1.7 % (ref 0–8)
ERYTHROCYTE [DISTWIDTH] IN BLOOD BY AUTOMATED COUNT: 19.1 % (ref 11.5–14.5)
FERRITIN SERPL-MCNC: 60 NG/ML (ref 20–300)
GLUCOSE UR QL STRIP: NEGATIVE
HAPTOGLOB SERPL-MCNC: 308 MG/DL (ref 30–250)
HCT VFR BLD AUTO: 29.2 % (ref 40–54)
HGB BLD-MCNC: 8.6 G/DL (ref 14–18)
HGB UR QL STRIP: NEGATIVE
IMM GRANULOCYTES # BLD AUTO: 0.05 K/UL (ref 0–0.04)
IMM GRANULOCYTES NFR BLD AUTO: 0.5 % (ref 0–0.5)
IRON SERPL-MCNC: 20 UG/DL (ref 45–160)
KETONES UR QL STRIP: NEGATIVE
LDH SERPL L TO P-CCNC: 247 U/L (ref 110–260)
LEUKOCYTE ESTERASE UR QL STRIP: NEGATIVE
LYMPHOCYTES # BLD AUTO: 1.9 K/UL (ref 1–4.8)
LYMPHOCYTES NFR BLD: 18.3 % (ref 18–48)
MCH RBC QN AUTO: 23.5 PG (ref 27–31)
MCHC RBC AUTO-ENTMCNC: 29.5 G/DL (ref 32–36)
MCV RBC AUTO: 80 FL (ref 82–98)
MONOCYTES # BLD AUTO: 0.8 K/UL (ref 0.3–1)
MONOCYTES NFR BLD: 7.8 % (ref 4–15)
NEUTROPHILS # BLD AUTO: 7.5 K/UL (ref 1.8–7.7)
NEUTROPHILS NFR BLD: 71.2 % (ref 38–73)
NITRITE UR QL STRIP: NEGATIVE
NRBC BLD-RTO: 0 /100 WBC
PH UR STRIP: 6 [PH] (ref 5–8)
PLATELET # BLD AUTO: 355 K/UL (ref 150–450)
PMV BLD AUTO: 8.4 FL (ref 9.2–12.9)
PROT UR QL STRIP: NEGATIVE
RBC # BLD AUTO: 3.66 M/UL (ref 4.6–6.2)
SATURATED IRON: 8 % (ref 20–50)
SP GR UR STRIP: 1.02 (ref 1–1.03)
TOTAL IRON BINDING CAPACITY: 266 UG/DL (ref 250–450)
TRANSFERRIN SERPL-MCNC: 180 MG/DL (ref 200–375)
URN SPEC COLLECT METH UR: NORMAL
VIT B12 SERPL-MCNC: 398 PG/ML (ref 210–950)
WBC # BLD AUTO: 10.47 K/UL (ref 3.9–12.7)

## 2022-01-01 PROCEDURE — 84466 ASSAY OF TRANSFERRIN: CPT | Mod: HCNC | Performed by: STUDENT IN AN ORGANIZED HEALTH CARE EDUCATION/TRAINING PROGRAM

## 2022-01-01 PROCEDURE — 36415 COLL VENOUS BLD VENIPUNCTURE: CPT | Mod: HCNC,PN | Performed by: STUDENT IN AN ORGANIZED HEALTH CARE EDUCATION/TRAINING PROGRAM

## 2022-01-01 PROCEDURE — 63600175 PHARM REV CODE 636 W HCPCS: Mod: HCNC,PN | Performed by: STUDENT IN AN ORGANIZED HEALTH CARE EDUCATION/TRAINING PROGRAM

## 2022-01-01 PROCEDURE — 99204 PR OFFICE/OUTPT VISIT, NEW, LEVL IV, 45-59 MIN: ICD-10-PCS | Mod: HCNC,S$GLB,, | Performed by: STUDENT IN AN ORGANIZED HEALTH CARE EDUCATION/TRAINING PROGRAM

## 2022-01-01 PROCEDURE — 1100F PR PT FALLS ASSESS DOC 2+ FALLS/FALL W/INJURY/YR: ICD-10-PCS | Mod: HCNC,CPTII,S$GLB, | Performed by: STUDENT IN AN ORGANIZED HEALTH CARE EDUCATION/TRAINING PROGRAM

## 2022-01-01 PROCEDURE — 1159F PR MEDICATION LIST DOCUMENTED IN MEDICAL RECORD: ICD-10-PCS | Mod: HCNC,CPTII,S$GLB, | Performed by: FAMILY MEDICINE

## 2022-01-01 PROCEDURE — 74019 RADEX ABDOMEN 2 VIEWS: CPT | Mod: 26,HCNC,, | Performed by: RADIOLOGY

## 2022-01-01 PROCEDURE — 99999 PR PBB SHADOW E&M-EST. PATIENT-LVL IV: CPT | Mod: PBBFAC,HCNC,, | Performed by: NURSE PRACTITIONER

## 2022-01-01 PROCEDURE — 3288F FALL RISK ASSESSMENT DOCD: CPT | Mod: HCNC,CPTII,S$GLB, | Performed by: UROLOGY

## 2022-01-01 PROCEDURE — 83010 ASSAY OF HAPTOGLOBIN QUANT: CPT | Mod: HCNC | Performed by: STUDENT IN AN ORGANIZED HEALTH CARE EDUCATION/TRAINING PROGRAM

## 2022-01-01 PROCEDURE — 1126F AMNT PAIN NOTED NONE PRSNT: CPT | Mod: HCNC,CPTII,S$GLB, | Performed by: UROLOGY

## 2022-01-01 PROCEDURE — 99499 UNLISTED E&M SERVICE: CPT | Mod: S$GLB,,, | Performed by: FAMILY MEDICINE

## 2022-01-01 PROCEDURE — 81003 URINALYSIS AUTO W/O SCOPE: CPT | Mod: HCNC,PO | Performed by: NURSE PRACTITIONER

## 2022-01-01 PROCEDURE — 74019 XR ABDOMEN FLAT AND ERECT: ICD-10-PCS | Mod: 26,HCNC,, | Performed by: RADIOLOGY

## 2022-01-01 PROCEDURE — 3072F LOW RISK FOR RETINOPATHY: CPT | Mod: HCNC,CPTII,S$GLB, | Performed by: NURSE PRACTITIONER

## 2022-01-01 PROCEDURE — 76770 US EXAM ABDO BACK WALL COMP: CPT | Mod: 26,HCNC,, | Performed by: RADIOLOGY

## 2022-01-01 PROCEDURE — 99215 OFFICE O/P EST HI 40 MIN: CPT | Mod: HCNC,S$GLB,, | Performed by: FAMILY MEDICINE

## 2022-01-01 PROCEDURE — 25000003 PHARM REV CODE 250: Mod: HCNC,PN | Performed by: STUDENT IN AN ORGANIZED HEALTH CARE EDUCATION/TRAINING PROGRAM

## 2022-01-01 PROCEDURE — 3288F PR FALLS RISK ASSESSMENT DOCUMENTED: ICD-10-PCS | Mod: HCNC,CPTII,S$GLB, | Performed by: FAMILY MEDICINE

## 2022-01-01 PROCEDURE — 1125F PR PAIN SEVERITY QUANTIFIED, PAIN PRESENT: ICD-10-PCS | Mod: HCNC,CPTII,S$GLB, | Performed by: NURSE PRACTITIONER

## 2022-01-01 PROCEDURE — 1160F RVW MEDS BY RX/DR IN RCRD: CPT | Mod: HCNC,CPTII,S$GLB, | Performed by: UROLOGY

## 2022-01-01 PROCEDURE — 3288F PR FALLS RISK ASSESSMENT DOCUMENTED: ICD-10-PCS | Mod: HCNC,CPTII,S$GLB, | Performed by: STUDENT IN AN ORGANIZED HEALTH CARE EDUCATION/TRAINING PROGRAM

## 2022-01-01 PROCEDURE — 99499 RISK ADDL DX/OHS AUDIT: ICD-10-PCS | Mod: S$GLB,,, | Performed by: FAMILY MEDICINE

## 2022-01-01 PROCEDURE — 3288F FALL RISK ASSESSMENT DOCD: CPT | Mod: HCNC,CPTII,S$GLB, | Performed by: NURSE PRACTITIONER

## 2022-01-01 PROCEDURE — 99999 PR PBB SHADOW E&M-EST. PATIENT-LVL III: ICD-10-PCS | Mod: PBBFAC,HCNC,, | Performed by: FAMILY MEDICINE

## 2022-01-01 PROCEDURE — 76770 US EXAM ABDO BACK WALL COMP: CPT | Mod: TC,HCNC,PO

## 2022-01-01 PROCEDURE — 1125F AMNT PAIN NOTED PAIN PRSNT: CPT | Mod: HCNC,CPTII,S$GLB, | Performed by: NURSE PRACTITIONER

## 2022-01-01 PROCEDURE — 1160F PR REVIEW ALL MEDS BY PRESCRIBER/CLIN PHARMACIST DOCUMENTED: ICD-10-PCS | Mod: HCNC,CPTII,S$GLB, | Performed by: NURSE PRACTITIONER

## 2022-01-01 PROCEDURE — 1160F PR REVIEW ALL MEDS BY PRESCRIBER/CLIN PHARMACIST DOCUMENTED: ICD-10-PCS | Mod: HCNC,CPTII,S$GLB, | Performed by: UROLOGY

## 2022-01-01 PROCEDURE — 99999 PR PBB SHADOW E&M-EST. PATIENT-LVL IV: ICD-10-PCS | Mod: PBBFAC,HCNC,, | Performed by: STUDENT IN AN ORGANIZED HEALTH CARE EDUCATION/TRAINING PROGRAM

## 2022-01-01 PROCEDURE — 99204 OFFICE O/P NEW MOD 45 MIN: CPT | Mod: HCNC,S$GLB,, | Performed by: STUDENT IN AN ORGANIZED HEALTH CARE EDUCATION/TRAINING PROGRAM

## 2022-01-01 PROCEDURE — 1159F MED LIST DOCD IN RCRD: CPT | Mod: HCNC,CPTII,S$GLB, | Performed by: UROLOGY

## 2022-01-01 PROCEDURE — 86850 RBC ANTIBODY SCREEN: CPT | Mod: PN | Performed by: STUDENT IN AN ORGANIZED HEALTH CARE EDUCATION/TRAINING PROGRAM

## 2022-01-01 PROCEDURE — 99203 PR OFFICE/OUTPT VISIT, NEW, LEVL III, 30-44 MIN: ICD-10-PCS | Mod: HCNC,S$GLB,, | Performed by: UROLOGY

## 2022-01-01 PROCEDURE — 1100F PTFALLS ASSESS-DOCD GE2>/YR: CPT | Mod: HCNC,CPTII,S$GLB, | Performed by: STUDENT IN AN ORGANIZED HEALTH CARE EDUCATION/TRAINING PROGRAM

## 2022-01-01 PROCEDURE — 3072F LOW RISK FOR RETINOPATHY: CPT | Mod: HCNC,CPTII,S$GLB, | Performed by: STUDENT IN AN ORGANIZED HEALTH CARE EDUCATION/TRAINING PROGRAM

## 2022-01-01 PROCEDURE — 99214 PR OFFICE/OUTPT VISIT, EST, LEVL IV, 30-39 MIN: ICD-10-PCS | Mod: HCNC,S$GLB,, | Performed by: NURSE PRACTITIONER

## 2022-01-01 PROCEDURE — 3288F FALL RISK ASSESSMENT DOCD: CPT | Mod: HCNC,CPTII,S$GLB, | Performed by: FAMILY MEDICINE

## 2022-01-01 PROCEDURE — 99999 PR PBB SHADOW E&M-EST. PATIENT-LVL III: ICD-10-PCS | Mod: PBBFAC,HCNC,, | Performed by: UROLOGY

## 2022-01-01 PROCEDURE — 1100F PTFALLS ASSESS-DOCD GE2>/YR: CPT | Mod: HCNC,CPTII,S$GLB, | Performed by: FAMILY MEDICINE

## 2022-01-01 PROCEDURE — 99215 PR OFFICE/OUTPT VISIT, EST, LEVL V, 40-54 MIN: ICD-10-PCS | Mod: HCNC,S$GLB,, | Performed by: FAMILY MEDICINE

## 2022-01-01 PROCEDURE — 82728 ASSAY OF FERRITIN: CPT | Mod: HCNC | Performed by: STUDENT IN AN ORGANIZED HEALTH CARE EDUCATION/TRAINING PROGRAM

## 2022-01-01 PROCEDURE — 1125F PR PAIN SEVERITY QUANTIFIED, PAIN PRESENT: ICD-10-PCS | Mod: HCNC,CPTII,S$GLB, | Performed by: FAMILY MEDICINE

## 2022-01-01 PROCEDURE — 3072F LOW RISK FOR RETINOPATHY: CPT | Mod: HCNC,CPTII,S$GLB, | Performed by: FAMILY MEDICINE

## 2022-01-01 PROCEDURE — 3072F LOW RISK FOR RETINOPATHY: CPT | Mod: HCNC,CPTII,S$GLB, | Performed by: UROLOGY

## 2022-01-01 PROCEDURE — 3288F PR FALLS RISK ASSESSMENT DOCUMENTED: ICD-10-PCS | Mod: HCNC,CPTII,S$GLB, | Performed by: NURSE PRACTITIONER

## 2022-01-01 PROCEDURE — 3072F PR LOW RISK FOR RETINOPATHY: ICD-10-PCS | Mod: HCNC,CPTII,S$GLB, | Performed by: NURSE PRACTITIONER

## 2022-01-01 PROCEDURE — 1126F PR PAIN SEVERITY QUANTIFIED, NO PAIN PRESENT: ICD-10-PCS | Mod: HCNC,CPTII,S$GLB, | Performed by: FAMILY MEDICINE

## 2022-01-01 PROCEDURE — 85025 COMPLETE CBC W/AUTO DIFF WBC: CPT | Mod: HCNC,PN | Performed by: STUDENT IN AN ORGANIZED HEALTH CARE EDUCATION/TRAINING PROGRAM

## 2022-01-01 PROCEDURE — 82607 VITAMIN B-12: CPT | Mod: HCNC | Performed by: STUDENT IN AN ORGANIZED HEALTH CARE EDUCATION/TRAINING PROGRAM

## 2022-01-01 PROCEDURE — 1125F AMNT PAIN NOTED PAIN PRSNT: CPT | Mod: HCNC,CPTII,S$GLB, | Performed by: FAMILY MEDICINE

## 2022-01-01 PROCEDURE — 1126F AMNT PAIN NOTED NONE PRSNT: CPT | Mod: HCNC,CPTII,S$GLB, | Performed by: FAMILY MEDICINE

## 2022-01-01 PROCEDURE — 1125F AMNT PAIN NOTED PAIN PRSNT: CPT | Mod: HCNC,CPTII,S$GLB, | Performed by: STUDENT IN AN ORGANIZED HEALTH CARE EDUCATION/TRAINING PROGRAM

## 2022-01-01 PROCEDURE — 74019 RADEX ABDOMEN 2 VIEWS: CPT | Mod: TC,HCNC,FY,PO

## 2022-01-01 PROCEDURE — 1125F PR PAIN SEVERITY QUANTIFIED, PAIN PRESENT: ICD-10-PCS | Mod: HCNC,CPTII,S$GLB, | Performed by: STUDENT IN AN ORGANIZED HEALTH CARE EDUCATION/TRAINING PROGRAM

## 2022-01-01 PROCEDURE — 3072F PR LOW RISK FOR RETINOPATHY: ICD-10-PCS | Mod: HCNC,CPTII,S$GLB, | Performed by: UROLOGY

## 2022-01-01 PROCEDURE — 3072F PR LOW RISK FOR RETINOPATHY: ICD-10-PCS | Mod: HCNC,CPTII,S$GLB, | Performed by: STUDENT IN AN ORGANIZED HEALTH CARE EDUCATION/TRAINING PROGRAM

## 2022-01-01 PROCEDURE — 99499 UNLISTED E&M SERVICE: CPT | Mod: S$GLB,,, | Performed by: STUDENT IN AN ORGANIZED HEALTH CARE EDUCATION/TRAINING PROGRAM

## 2022-01-01 PROCEDURE — 1101F PT FALLS ASSESS-DOCD LE1/YR: CPT | Mod: HCNC,CPTII,S$GLB, | Performed by: FAMILY MEDICINE

## 2022-01-01 PROCEDURE — 3072F PR LOW RISK FOR RETINOPATHY: ICD-10-PCS | Mod: HCNC,CPTII,S$GLB, | Performed by: FAMILY MEDICINE

## 2022-01-01 PROCEDURE — 99214 PR OFFICE/OUTPT VISIT, EST, LEVL IV, 30-39 MIN: ICD-10-PCS | Mod: HCNC,S$GLB,, | Performed by: FAMILY MEDICINE

## 2022-01-01 PROCEDURE — 1160F RVW MEDS BY RX/DR IN RCRD: CPT | Mod: HCNC,CPTII,S$GLB, | Performed by: NURSE PRACTITIONER

## 2022-01-01 PROCEDURE — 99203 OFFICE O/P NEW LOW 30 MIN: CPT | Mod: HCNC,S$GLB,, | Performed by: UROLOGY

## 2022-01-01 PROCEDURE — 99999 PR PBB SHADOW E&M-EST. PATIENT-LVL IV: ICD-10-PCS | Mod: PBBFAC,HCNC,, | Performed by: NURSE PRACTITIONER

## 2022-01-01 PROCEDURE — 1100F PR PT FALLS ASSESS DOC 2+ FALLS/FALL W/INJURY/YR: ICD-10-PCS | Mod: HCNC,CPTII,S$GLB, | Performed by: FAMILY MEDICINE

## 2022-01-01 PROCEDURE — 1101F PR PT FALLS ASSESS DOC 0-1 FALLS W/OUT INJ PAST YR: ICD-10-PCS | Mod: HCNC,CPTII,S$GLB, | Performed by: UROLOGY

## 2022-01-01 PROCEDURE — 83615 LACTATE (LD) (LDH) ENZYME: CPT | Mod: HCNC,PN | Performed by: STUDENT IN AN ORGANIZED HEALTH CARE EDUCATION/TRAINING PROGRAM

## 2022-01-01 PROCEDURE — 99999 PR PBB SHADOW E&M-EST. PATIENT-LVL III: CPT | Mod: PBBFAC,HCNC,, | Performed by: FAMILY MEDICINE

## 2022-01-01 PROCEDURE — 1159F PR MEDICATION LIST DOCUMENTED IN MEDICAL RECORD: ICD-10-PCS | Mod: HCNC,CPTII,S$GLB, | Performed by: UROLOGY

## 2022-01-01 PROCEDURE — 1101F PT FALLS ASSESS-DOCD LE1/YR: CPT | Mod: HCNC,CPTII,S$GLB, | Performed by: NURSE PRACTITIONER

## 2022-01-01 PROCEDURE — 99999 PR PBB SHADOW E&M-EST. PATIENT-LVL IV: CPT | Mod: PBBFAC,HCNC,, | Performed by: STUDENT IN AN ORGANIZED HEALTH CARE EDUCATION/TRAINING PROGRAM

## 2022-01-01 PROCEDURE — 1126F PR PAIN SEVERITY QUANTIFIED, NO PAIN PRESENT: ICD-10-PCS | Mod: HCNC,CPTII,S$GLB, | Performed by: UROLOGY

## 2022-01-01 PROCEDURE — 1159F MED LIST DOCD IN RCRD: CPT | Mod: HCNC,CPTII,S$GLB, | Performed by: NURSE PRACTITIONER

## 2022-01-01 PROCEDURE — 3288F PR FALLS RISK ASSESSMENT DOCUMENTED: ICD-10-PCS | Mod: HCNC,CPTII,S$GLB, | Performed by: UROLOGY

## 2022-01-01 PROCEDURE — 1101F PR PT FALLS ASSESS DOC 0-1 FALLS W/OUT INJ PAST YR: ICD-10-PCS | Mod: HCNC,CPTII,S$GLB, | Performed by: FAMILY MEDICINE

## 2022-01-01 PROCEDURE — 76770 US RETROPERITONEAL COMPLETE: ICD-10-PCS | Mod: 26,HCNC,, | Performed by: RADIOLOGY

## 2022-01-01 PROCEDURE — 99999 PR PBB SHADOW E&M-EST. PATIENT-LVL III: CPT | Mod: PBBFAC,HCNC,, | Performed by: UROLOGY

## 2022-01-01 PROCEDURE — 1101F PR PT FALLS ASSESS DOC 0-1 FALLS W/OUT INJ PAST YR: ICD-10-PCS | Mod: HCNC,CPTII,S$GLB, | Performed by: NURSE PRACTITIONER

## 2022-01-01 PROCEDURE — 1159F MED LIST DOCD IN RCRD: CPT | Mod: HCNC,CPTII,S$GLB, | Performed by: FAMILY MEDICINE

## 2022-01-01 PROCEDURE — 1159F PR MEDICATION LIST DOCUMENTED IN MEDICAL RECORD: ICD-10-PCS | Mod: HCNC,CPTII,S$GLB, | Performed by: NURSE PRACTITIONER

## 2022-01-01 PROCEDURE — 99499 RISK ADDL DX/OHS AUDIT: ICD-10-PCS | Mod: S$GLB,,, | Performed by: STUDENT IN AN ORGANIZED HEALTH CARE EDUCATION/TRAINING PROGRAM

## 2022-01-01 PROCEDURE — 3288F FALL RISK ASSESSMENT DOCD: CPT | Mod: HCNC,CPTII,S$GLB, | Performed by: STUDENT IN AN ORGANIZED HEALTH CARE EDUCATION/TRAINING PROGRAM

## 2022-01-01 PROCEDURE — 99214 OFFICE O/P EST MOD 30 MIN: CPT | Mod: HCNC,S$GLB,, | Performed by: FAMILY MEDICINE

## 2022-01-01 PROCEDURE — 96365 THER/PROPH/DIAG IV INF INIT: CPT | Mod: HCNC,PN

## 2022-01-01 PROCEDURE — 86850 RBC ANTIBODY SCREEN: CPT | Performed by: STUDENT IN AN ORGANIZED HEALTH CARE EDUCATION/TRAINING PROGRAM

## 2022-01-01 PROCEDURE — 93296 REM INTERROG EVL PM/IDS: CPT | Mod: PO | Performed by: INTERNAL MEDICINE

## 2022-01-01 PROCEDURE — 99214 OFFICE O/P EST MOD 30 MIN: CPT | Mod: HCNC,S$GLB,, | Performed by: NURSE PRACTITIONER

## 2022-01-01 PROCEDURE — 1101F PT FALLS ASSESS-DOCD LE1/YR: CPT | Mod: HCNC,CPTII,S$GLB, | Performed by: UROLOGY

## 2022-01-01 RX ORDER — TEMAZEPAM 30 MG/1
60 CAPSULE ORAL NIGHTLY PRN
Qty: 60 CAPSULE | Refills: 5 | Status: SHIPPED | OUTPATIENT
Start: 2022-01-01 | End: 2022-01-01

## 2022-01-01 RX ORDER — HYDROCODONE BITARTRATE AND ACETAMINOPHEN 5; 325 MG/1; MG/1
1 TABLET ORAL EVERY 12 HOURS PRN
Qty: 30 TABLET | Refills: 0 | Status: SHIPPED | OUTPATIENT
Start: 2022-01-01 | End: 2022-01-01

## 2022-01-01 RX ORDER — METHYLPREDNISOLONE SOD SUCC 125 MG
125 VIAL (EA) INJECTION ONCE AS NEEDED
Status: CANCELLED | OUTPATIENT
Start: 2022-01-01

## 2022-01-01 RX ORDER — DIPHENHYDRAMINE HYDROCHLORIDE 50 MG/ML
50 INJECTION INTRAMUSCULAR; INTRAVENOUS ONCE AS NEEDED
Status: CANCELLED | OUTPATIENT
Start: 2022-01-01

## 2022-01-01 RX ORDER — EPINEPHRINE 0.3 MG/.3ML
0.3 INJECTION SUBCUTANEOUS ONCE AS NEEDED
Status: CANCELLED | OUTPATIENT
Start: 2022-01-01

## 2022-01-01 RX ORDER — OXYBUTYNIN CHLORIDE 5 MG/1
5 TABLET, EXTENDED RELEASE ORAL DAILY
Qty: 30 TABLET | Refills: 11 | Status: SHIPPED | OUTPATIENT
Start: 2022-01-01 | End: 2022-01-01

## 2022-01-01 RX ORDER — HEPARIN 100 UNIT/ML
500 SYRINGE INTRAVENOUS
Status: DISCONTINUED | OUTPATIENT
Start: 2022-01-01 | End: 2022-01-01 | Stop reason: HOSPADM

## 2022-01-01 RX ORDER — DICLOFENAC SODIUM 10 MG/G
2 GEL TOPICAL DAILY
Qty: 100 G | Refills: 2 | Status: SHIPPED | OUTPATIENT
Start: 2022-01-01

## 2022-01-01 RX ORDER — SODIUM CHLORIDE 0.9 % (FLUSH) 0.9 %
10 SYRINGE (ML) INJECTION
Status: CANCELLED | OUTPATIENT
Start: 2022-01-01

## 2022-01-01 RX ORDER — TAMSULOSIN HYDROCHLORIDE 0.4 MG/1
0.4 CAPSULE ORAL DAILY
Qty: 30 CAPSULE | Refills: 3 | Status: SHIPPED | OUTPATIENT
Start: 2022-01-01 | End: 2022-01-01

## 2022-01-01 RX ORDER — HEPARIN 100 UNIT/ML
500 SYRINGE INTRAVENOUS
Status: CANCELLED | OUTPATIENT
Start: 2022-01-01

## 2022-01-01 RX ORDER — TAMSULOSIN HYDROCHLORIDE 0.4 MG/1
0.4 CAPSULE ORAL DAILY
Qty: 90 CAPSULE | Refills: 3 | Status: SHIPPED | OUTPATIENT
Start: 2022-01-01 | End: 2022-01-01 | Stop reason: SDUPTHER

## 2022-01-01 RX ORDER — SODIUM CHLORIDE 0.9 % (FLUSH) 0.9 %
10 SYRINGE (ML) INJECTION
Status: DISCONTINUED | OUTPATIENT
Start: 2022-01-01 | End: 2022-01-01 | Stop reason: HOSPADM

## 2022-01-01 RX ADMIN — IRON SUCROSE 200 MG: 20 INJECTION, SOLUTION INTRAVENOUS at 12:01

## 2022-01-01 RX ADMIN — SODIUM CHLORIDE: 0.9 INJECTION, SOLUTION INTRAVENOUS at 12:01

## 2022-01-07 NOTE — TELEPHONE ENCOUNTER
Cecilia from home health stated patient is very weak & pale.  Cecilia would like to get orders for labs to see what is going on.  Cecilia stated she can do this today, if approved.

## 2022-01-07 NOTE — TELEPHONE ENCOUNTER
----- Message from Tyler Panchal sent at 1/7/2022  9:18 AM CST -----  Contact: Cecilia/[facility]  Type: Needs Medical Advice  Who Called:  Cecilia/[facility]  Symptoms (please be specific):  n/a  How long has patient had these symptoms:  n/a  Pharmacy name and phone #:  n/a  Best Call Back Number: [phone]  Additional Information: Cecilia called in an stated patient is very weak & pale.  Cecilia would like to get orders for labs to see what is going on.  Cecilia stated she can do this today, if approved.  Cecilia asked that if she cannot answer please leave a message.

## 2022-01-10 NOTE — TELEPHONE ENCOUNTER
----- Message from Ronald Benton sent at 1/10/2022  8:47 AM CST -----  Type: Needs Medical Advice  Who Called:  Cecilia/ [facility]    Best Call Back Number: [phone]  Additional Information: Caller states that she would like a callback regarding the patient's labs that she had drawn.

## 2022-01-10 NOTE — TELEPHONE ENCOUNTER
Spoke with  nurse Cecilia.  She stated patient had labs drawn on Friday and would like a call back if anything needs to be addressed.  Please advise.

## 2022-01-10 NOTE — TELEPHONE ENCOUNTER
Labs confirm presence of persistent anemia. Counts slightly down from when he left the hospital. He should follow-up with Gi as planned. Also arrange consultation with hematology to see if he needs another blood transfusion versus iron infusion.

## 2022-01-19 NOTE — TELEPHONE ENCOUNTER
The patient had a fall Saturday night and has a large skin tear on his left upper arm. She applied strips and covered it with adaptic and mepilix.

## 2022-01-19 NOTE — TELEPHONE ENCOUNTER
----- Message from Meli Loco sent at 1/18/2022  2:38 PM CST -----  Type: Needs Medical Advice  Who Called:  Cecilia Home Health Nurse  Best Call Back Number: [phone]  Additional Information: The patient had a fall Saturday night and has a large skin tear on his left upper arm. She applied strips and covered it with adaptic and mepilix.

## 2022-01-21 NOTE — TELEPHONE ENCOUNTER
LVM for patient regarding no need for blood transfusion today due to most recent CBC results per Dr. Louis. Call back number given for any additional questions.

## 2022-01-21 NOTE — PROGRESS NOTES
Chief Complaint   Patient presents with    Follow-up     4 week follow up    Fall     Felll Saturday, Sunday nite and Tuesday     HPI:   Here for 1 month f/u on chronic health issues.    Here with daughter and grandaughter who report general decline  Only taking iron twice a day    Not taking appetite supplement. He declines this.  Weight still going down    He was recently admitted for severe anemia  He was diagnosed with iron deficiency.  He is taking iron BID  Following with hematology  EGD was unremarkable    Getting  PT and nursing.  BP has been running low  Falling frequently    Diabetes - stopped metformin  QD-BID; Bgs 124 in AM. No hypoglycemia. Hgb A1c 6.0  Neuropathy has been stable.  He is using a topical analgesic 4 times a day which is helping.    HLD - stopped crestor 10mg daily  HTN - stable off benazepril  Insomnia - taking Restoril 30mg 2 nightly.  CAD s/p NSTEMI - underwent successful PCI of LAD; ICD  Taking oral B12    C/o persistent bilateral knee pain and requesting something topical      Past Medical History:   Diagnosis Date    Actinic keratosis     Anticoagulant long-term use     plavix    B12 deficiency anemia     Basal cell carcinoma 07/07/2014    right infra-auricular    Blepharoconjunctivitis of both eyes     Coronary artery disease 01/2017    1 stent 7/2016    Defibrillator discharge     Diabetes mellitus type II     Ectropion of eyelid     Lower lids    HLD (hyperlipidemia)     Insomnia     Ischemic cardiomyopathy 01/2017    Macular degeneration     Neuropathy due to secondary diabetes     Posterior vitreous detachment of both eyes     Squamous Cell Carcinoma            Past Surgical History:   Procedure Laterality Date    ALEXANDRE HOLE FOR SUBDURAL HEMATOMA      CARDIAC DEFIBRILLATOR PLACEMENT      CATARACT EXTRACTION      OU    CORNEAL TRANSPLANT Left 1995    CORONARY ANGIOPLASTY WITH STENT PLACEMENT  07/2016    DSEK- OS      ESOPHAGOGASTRODUODENOSCOPY N/A  "12/3/2021    Procedure: EGD (ESOPHAGOGASTRODUODENOSCOPY);  Surgeon: Rex Shepherd MD;  [facility];  Service: Endoscopy;  Laterality: N/A;    EYE SURGERY      HIP SURGERY      total hip replacement    JOINT REPLACEMENT           SOCIAL HISTORY: No tobacco. No alcohol. Lives with family.     FAMILY HISTORY: No prostate or colon cancer.     REVIEW OF SYSTEMS:     GENERAL: No fever, chills, fatigability  EYES: Visual acuity fine. Denies blurriness, tearing, itching, photophobia, diplopia, or visual changes.   EARS: Denies ear pain, discharge, tinnitus or vertigo. Denies hearing loss.   NOSE: No loss of smell, epistaxis, postnasal drip, discharge, obstruction, or sneezing.  MOUTH & THROAT: No hoarseness, change in voice, swallowing difficulty. No excessive gum bleeding.   HEMATOLOGICAL/NODES: Denies swollen glands. No bleeding or bruising.  CHEST: No ROBERTS, cyanosis, wheezing, cough or sputum production.  CARDIOVASCULAR: Denies chest pain, dyspnea, orthopnea, or palpitations.  GI/ABDOMEN: Appetite fine. No weight loss. Denies nausea, vomiting, diarrhea, constipation, abdominal pain, hematemesis or blood in stool.  URINARY: No dysuria,hematuria, nocturia, incontinence, flank pain, urgency, or urinary difficulty.  PERIPHERAL VASCULAR: No claudication, cold intolerance or cyanosis.  Reports numbness to feet and pain, which has been chronic. No worsening, no major improvement since starting Neurontin.  NEUROLOGIC: No history of seizures, paralysis, alteration of gait or coordination.    PSYCH:  No crying spells. Denies anxiety symptoms.    PHYSICAL EXAM:     /70   Pulse 102   Temp 97.5 °F (36.4 °C) (Oral)   Resp 18   Ht 5' 10" (1.778 m)   Wt 61.2 kg (135 lb)   SpO2 97%   BMI 19.37 kg/m²     GENERAL: He is a well-developed, well-nourished male, who appears in no apparent distress.   HEENT: NC/AT. PERRLA. EOMI. Mucous membranes are moist.   NECK: Supple. No carotid bruits. No thyroid enlargement. "   CHEST: Clear to auscultation bilaterally with no crackles, rhonchi or wheezes.   CARDIOVASCULAR: Reveals a regular rate and rhythm. Normal S1 and S2. No murmurs, rubs or gallops are noted.   ABDOMEN: Positive bowel sounds, soft, and nontender. No HSM. No masses.   EXTREMITIES: No edema noted and pulses 2+ bilaterally.   Right knee with joint hypertrophy and reduced extension  Foot exam: inspection normal, decreased sensation in bilateral feett; 2+ DP pulses bilaterally    Results for orders placed or performed in visit on 01/21/22   CBC w/ DIFF   Result Value Ref Range    WBC 10.47 3.90 - 12.70 K/uL    RBC 3.66 (L) 4.60 - 6.20 M/uL    Hemoglobin 8.6 (L) 14.0 - 18.0 g/dL    Hematocrit 29.2 (L) 40.0 - 54.0 %    MCV 80 (L) 82 - 98 fL    MCH 23.5 (L) 27.0 - 31.0 pg    MCHC 29.5 (L) 32.0 - 36.0 g/dL    RDW 19.1 (H) 11.5 - 14.5 %    Platelets 355 150 - 450 K/uL    MPV 8.4 (L) 9.2 - 12.9 fL    Immature Granulocytes 0.5 0.0 - 0.5 %    Gran # (ANC) 7.5 1.8 - 7.7 K/uL    Immature Grans (Abs) 0.05 (H) 0.00 - 0.04 K/uL    Lymph # 1.9 1.0 - 4.8 K/uL    Mono # 0.8 0.3 - 1.0 K/uL    Eos # 0.2 0.0 - 0.5 K/uL    Baso # 0.05 0.00 - 0.20 K/uL    nRBC 0 0 /100 WBC    Gran % 71.2 38.0 - 73.0 %    Lymph % 18.3 18.0 - 48.0 %    Mono % 7.8 4.0 - 15.0 %    Eosinophil % 1.7 0.0 - 8.0 %    Basophil % 0.5 0.0 - 1.9 %    Differential Method Automated    Ferritin   Result Value Ref Range    Ferritin 60 20.0 - 300.0 ng/mL   Iron and TIBC   Result Value Ref Range    Iron 20 (L) 45 - 160 ug/dL    Transferrin 180 (L) 200 - 375 mg/dL    TIBC 266 250 - 450 ug/dL    Saturated Iron 8 (L) 20 - 50 %   LDH   Result Value Ref Range     110 - 260 U/L   HAPTOGLOBIN   Result Value Ref Range    Haptoglobin 308 (H) 30 - 250 mg/dL   B-12   Result Value Ref Range    Vitamin B-12 398 210 - 950 pg/mL   Type & Screen   Result Value Ref Range    Group & Rh B POS     Indirect Crys GEL NEG          A/P:  Overactive bladder  -     oxybutynin (DITROPAN-XL) 5 MG  TR24; Take 1 tablet (5 mg total) by mouth once daily.  Dispense: 30 tablet; Refill: 11    Insomnia, unspecified type  -     temazepam (RESTORIL) 30 mg capsule; Take 2 capsules (60 mg total) by mouth nightly as needed for Insomnia. TAKE 1 CAPSULE BY MOUTH NIGHTLY AS NEEDED.  Dispense: 60 capsule; Refill: 5    Symptomatic anemia    Weight loss    Decreased appetite    Stage 3a chronic kidney disease    Primary osteoarthritis of right knee    Skin tear of elbow without complication, left, initial encounter    Diabetic peripheral neuropathy    Type 2 diabetes mellitus without complication, without long-term current use of insulin    Other orders  -     diclofenac sodium (VOLTAREN) 1 % Gel; Apply 2 g topically once daily.  Dispense: 100 g; Refill: 2     no further diabetic medications needed due to weight loss  Gradually declining. Continue supportive care  Trial of voltaren gel to knees  F/u with hematology; iron infusion as planned  Continue iron BID  Continue Restoril 60mg daily (risks and benefits of off label use discussed with patient and family and risk of death accepted)  Counseled on regular exercise, maintenance of a healthy weight, balanced diet rich in fruits/vegetables and lean protein, and avoidance of unhealthy habits like smoking and excessive alcohol intake.  f/u 6 months      1 hour spent in visit

## 2022-01-21 NOTE — TELEPHONE ENCOUNTER
----- Message from Robert Yarbrough sent at 1/21/2022  4:08 PM CST -----  Type:  Pharmacy Calling to Clarify an RX    Name of Caller:  Marianne  Pharmacy Name:      [facility] [place] [address]  [address] [place] [id number]  Phone: [phone] Fax: [phone]      Prescription Name:  temazepam (RESTORIL) 30 mg capsule  What do they need to clarify?:  instructions   Best Call Back Number:  [phone]  Additional Information:  Pharmacy is calling to get clarification on the instructions for this medication. They have two set of instructions and need to know what one to use. Please call them back at [phone] to update and advise.

## 2022-01-23 NOTE — PROGRESS NOTES
Insurance issues with [employer], will change plan to venofer 200mg weekly for 5 weeks    Will reschedule his harshad for after finishing up IV iron

## 2022-01-23 NOTE — PROGRESS NOTES
"Subjective:                                                                                    Consult note   Patient ID:    Name: Dom Guzman  : 1930  MRN: [id number]   Referral: Dr Tobar       HPI:   Dom Guzman is a 89 y.o. male presents for evaluation of Other and Anemia    Patient was referred for TANK, had a recently admissions for sever anemia, diagnosed with TANK and he is taking iron pills BID, had EGD that was negative. Today, patient is here with his daughter and granddaugher, patient is being falling and having significant generalized weakness and fatigued. Patient was also having nausea " feeling sick to his stomach" and "wanted to lay down", but no vomiting, diarrhea, family denies any bleeding in the stool,urine or vomiting blood. Hb seems stable until 3/2021 when his Hb dropped from 11 to 8.9 and cont to drop in Dec to 6.1 s/p blood transfusion. Denies any new medication, his wife  in , after which most of his health declining . Last colonoscopy a long time ago, patient is not a candidate for more colonoscopy given his age.     Past Medical History:   Diagnosis Date    Actinic keratosis     Anticoagulant long-term use     plavix    B12 deficiency anemia     Basal cell carcinoma 2014    right infra-auricular    Blepharoconjunctivitis of both eyes     Coronary artery disease 2017    1 stent 2016    Defibrillator discharge     Diabetes mellitus type II     Ectropion of eyelid     Lower lids    HLD (hyperlipidemia)     Insomnia     Ischemic cardiomyopathy 2017    Macular degeneration     Neuropathy due to secondary diabetes     Posterior vitreous detachment of both eyes     Squamous Cell Carcinoma        Past Surgical History:   Procedure Laterality Date    ALEXANDRE HOLE FOR SUBDURAL HEMATOMA      CARDIAC DEFIBRILLATOR PLACEMENT      CATARACT EXTRACTION      OU    CORNEAL TRANSPLANT Left     CORONARY ANGIOPLASTY WITH STENT PLACEMENT  2016    " DSEK- OS      ESOPHAGOGASTRODUODENOSCOPY N/A 12/3/2021    Procedure: EGD (ESOPHAGOGASTRODUODENOSCOPY);  Surgeon: Rex Shepherd MD;  Location: [facility];  Service: Endoscopy;  Laterality: N/A;    EYE SURGERY      HIP SURGERY      total hip replacement    JOINT REPLACEMENT         Family History   Problem Relation Age of Onset    Heart disease Father     Diabetes Sister     Blindness Brother     Diabetes Brother     Melanoma Neg Hx     Psoriasis Neg Hx     Lupus Neg Hx     Eczema Neg Hx     Amblyopia Neg Hx     Cancer Neg Hx     Cataracts Neg Hx     Glaucoma Neg Hx     Hypertension Neg Hx     Macular degeneration Neg Hx     Retinal detachment Neg Hx     Strabismus Neg Hx     Stroke Neg Hx     Thyroid disease Neg Hx        Social History     Socioeconomic History    Marital status:    Tobacco Use    Smoking status: Never Smoker    Smokeless tobacco: Never Used   Substance and Sexual Activity    Alcohol use: No    Drug use: No       Review of patient's allergies indicates:   Allergen Reactions    Carvedilol Nausea Only    Codeine      Other reaction(s): Unknown       Review of Systems   Constitutional: Positive for decreased appetite, malaise/fatigue and weight loss. Negative for diaphoresis, fever and night sweats.   HENT: Negative for ear pain, odynophagia and tinnitus.    Cardiovascular: Positive for dyspnea on exertion and palpitations. Negative for chest pain and leg swelling.   Respiratory: Negative for cough, hemoptysis, shortness of breath and wheezing.    Hematologic/Lymphatic: Negative for adenopathy and bleeding problem. Does not bruise/bleed easily.   Skin: Negative for flushing and rash.   Musculoskeletal: Negative for arthritis, falls, joint swelling, myalgias and stiffness.   Gastrointestinal: Positive for nausea. Negative for constipation, diarrhea, hematemesis, hematochezia, hemorrhoids, jaundice, melena and vomiting.   Genitourinary: Negative for dysuria,  "frequency and hematuria.   Neurological: Positive for light-headedness and weakness. Negative for focal weakness, headaches and loss of balance.   Psychiatric/Behavioral: The patient is not nervous/anxious.             Objective:     Vitals:    01/21/22 1113   BP: 106/64   BP Location: Right arm   Patient Position: Sitting   BP Method: Medium (Automatic)   Pulse: 86   Resp: 17   Temp: 96.3 °F (35.7 °C)   TempSrc: Temporal   SpO2: 98%   Weight: 61.6 kg (135 lb 12.2 oz)   Height: 5' 10" (1.778 m)        Physical Exam  Constitutional:       General: He is in acute distress.      Appearance: He is ill-appearing.   Eyes:      General: No scleral icterus.     Pupils: Pupils are equal, round, and reactive to light.   Cardiovascular:      Rate and Rhythm: Normal rate and regular rhythm.   Pulmonary:      Effort: Pulmonary effort is normal.      Breath sounds: Rales present. No rhonchi.   Abdominal:      General: Abdomen is flat. Bowel sounds are normal.      Palpations: Abdomen is soft.      Tenderness: There is no abdominal tenderness. There is no guarding.   Musculoskeletal:         General: Swelling present. No tenderness.      Cervical back: Neck supple. No tenderness.   Lymphadenopathy:      Cervical: No cervical adenopathy.   Skin:     General: Skin is warm and dry.      Findings: Rash present. No bruising, erythema or lesion.   Neurological:      General: No focal deficit present.      Mental Status: He is oriented to person, place, and time.   Psychiatric:         Mood and Affect: Mood normal.         Behavior: Behavior normal.             Current Outpatient Medications on File Prior to Visit   Medication Sig    azelastine (ASTELIN) 137 mcg (0.1 %) nasal spray 1 SPRAY (137 MCG TOTAL) BY NASAL ROUTE 2 (TWO) TIMES DAILY.    ferrous sulfate (FEOSOL) Tab tablet Take 1 tablet (1 each total) by mouth 2 (two) times daily.    fluticasone propionate (FLONASE) 50 mcg/actuation nasal spray 1 SPRAY (50 MCG TOTAL) BY EACH " NOSTRIL ROUTE ONCE DAILY.    ketoconazole (NIZORAL) 2 % shampoo Wash hair and face with medicated shampoo at least 2x/week - let sit on scalp at least 5 minutes prior to rinsing.    MINERAL OIL/PETROLATUM,WHITE (SYSTANE NIGHTTIME OPHT) Apply 1-2 drops to eye every evening.    pedi multivit no.11-folic acid 200 mcg Chew Take 1 Dose by mouth Daily. 1 gummy PO a day    PROPYLENE GLYCOL/ (SYSTANE ULTRA OPHT) Place 1 drop into both eyes 3 (three) times daily.    diclofenac sodium (VOLTAREN) 1 % Gel Apply 2 g topically once daily.    meclizine (ANTIVERT) 25 mg tablet Take 1 tablet (25 mg total) by mouth 3 (three) times daily as needed. (Patient not taking: No sig reported)    oxybutynin (DITROPAN-XL) 5 MG TR24 Take 1 tablet (5 mg total) by mouth once daily.    temazepam (RESTORIL) 30 mg capsule Take 2 capsules (60 mg total) by mouth nightly as needed for Insomnia. TAKE 1 CAPSULE BY MOUTH NIGHTLY AS NEEDED.     No current facility-administered medications on file prior to visit.       CBC:  Lab Results   Component Value Date    WBC 10.47 01/21/2022    HGB 8.6 (L) 01/21/2022    HCT 29.2 (L) 01/21/2022    MCV 80 (L) 01/21/2022     01/21/2022         CMP:  Sodium   Date Value Ref Range Status   01/07/2022 136 136 - 145 mmol/L Final     Potassium   Date Value Ref Range Status   01/07/2022 4.0 3.5 - 5.1 mmol/L Final     Chloride   Date Value Ref Range Status   01/07/2022 108 95 - 110 mmol/L Final     CO2   Date Value Ref Range Status   01/07/2022 20 (L) 22 - 31 mmol/L Final     Glucose   Date Value Ref Range Status   01/07/2022 120 (H) 70 - 110 mg/dL Final     Comment:     The ADA recommends the following guidelines for fasting glucose:    Normal:       less than 100 mg/dL    Prediabetes:  100 mg/dL to 125 mg/dL    Diabetes:     126 mg/dL or higher       BUN   Date Value Ref Range Status   01/07/2022 16 9 - 21 mg/dL Final     Creatinine   Date Value Ref Range Status   01/07/2022 0.95 0.50 - 1.40 mg/dL  Final     Calcium   Date Value Ref Range Status   01/07/2022 8.0 (L) 8.4 - 10.2 mg/dL Final     Total Protein   Date Value Ref Range Status   12/15/2021 6.2 6.0 - 8.4 g/dL Final     Albumin   Date Value Ref Range Status   12/15/2021 2.7 (L) 3.5 - 5.2 g/dL Final     Total Bilirubin   Date Value Ref Range Status   12/15/2021 0.4 0.1 - 1.0 mg/dL Final     Comment:     For infants and newborns, interpretation of results should be based  on gestational age, weight and in agreement with clinical  observations.    Premature Infant recommended reference ranges:  Up to 24 hours.............<8.0 mg/dL  Up to 48 hours............<12.0 mg/dL  3-5 days..................<15.0 mg/dL  6-29 days.................<15.0 mg/dL       Alkaline Phosphatase   Date Value Ref Range Status   12/15/2021 100 55 - 135 U/L Final     AST   Date Value Ref Range Status   12/15/2021 15 10 - 40 U/L Final     ALT   Date Value Ref Range Status   12/15/2021 9 (L) 10 - 44 U/L Final     Anion Gap   Date Value Ref Range Status   01/07/2022 8 8 - 16 mmol/L Final     eGFR if    Date Value Ref Range Status   01/07/2022 >60 >60 mL/min/1.73 m^2 Final     eGFR if non    Date Value Ref Range Status   01/07/2022 >60 >60 mL/min/1.73 m^2 Final     Comment:     Calculation used to obtain the estimated glomerular filtration  rate (eGFR) is the CKD-EPI equation.          Lab Results   Component Value Date    IRON 20 (L) 01/21/2022    TIBC 266 01/21/2022    FERRITIN 60 01/21/2022       Lab Results   Component Value Date    DIPJZKTF02 398 01/21/2022   ,  Lab Results   Component Value Date    FOLATE 4.1 05/19/2014       Cardiac device check - Remote  Battery and Leads (BL)  Auto-threshold programmed off on lead(s)  Normal parameters noted on battery and lead(s)    Presenting Rhythm (OH)  Atrial Sensing-Ventricular Pacing (AS-)    Arrhythmic events (AE)  No new arrhythmic events since last remote monitoring transmission    Heart Failure  (HF)  No significant change in activity level is noted on trending  Total CRT pacing percentage noted to be above 95%  No significant decrease in transthoracic impedance    Transmission Information (TI)  Device Summary Report    Follow Up (FU)  Continue remote monitoring quarterly    Additional Comments  REMOTE Interrogation Report:  CRTD  [employer]  Todays Date: 1/14/22  Since: 11/29/21 remote interrogation  Presenting EGM Demonstrates: AS-RVP, PVC  Device Fxn: WNL  Autocapture Algorithms are: RA off, RV on, LV off  RAp~ 3 %  BiVP~ 99 %  Atrial Arrhythmias: 0 Atrial arrhythmia episodes per day. Up to 3 Mode switches per day. Atrial burden 0% (mean values). No EGMs or additional data available for review.  Anticoagulation Status: unknown  Ventricular Arrhythmias: none  Battery Status/Longevity: MOS1/ 53%  HF status: TI benign. Activity level stable.  F/U via remote interrogation in 3 months    Report Prepared By: BHAVYA Malone       All pertinent labs and imaging reviewed.    Assessment:       1. Stage 3a chronic kidney disease    2. Symptomatic anemia    3. Other dietary vitamin B12 deficiency anemia       # Symptomatic microcytic anemia:  - likely due to TANK, possible malnutrition, negative EGD, age inappropriate for a colonoscopy   - Will defer bone marrow biopsy given his age and co morbidities, if MDS is the diagnosis, the management would be supportive care with IV iron   - will repeat blood work now, and if hb <7 patient might need blood tranfusion  - if Hb >7, will proceed with IV iron as soon as possible   - Discussed with family about options will go with domingo malcolm for a total of 1g of IV iron   - ruling out hemolysis with LDH/haptoglobin/retic  - checking B12 and folate for complete work up and previous history of b12 deficiency   - also patient with CKD, might benefit from Epo injection once Ferritin is >150 and Hb <10.     #B12 deficiency:  - on PO pills but unsure if patient is taking it and to  minimized his pills we will discuss possible given him IM monthly, but will need to check his level before     #CKD: following up with PCP and nephrology     Plan:     Stage 3a chronic kidney disease    Symptomatic anemia  -     Ambulatory referral/consult to Hematology / Oncology  -     CBC w/ DIFF; Future; Expected date: 01/21/2022  -     Ferritin; Future; Expected date: 01/21/2022  -     Iron and TIBC; Future; Expected date: 01/21/2022  -     LDH; Future; Expected date: 01/21/2022  -     HAPTOGLOBIN; Future; Expected date: 01/21/2022  -     B-12; Future; Expected date: 01/21/2022  -     Type & Screen; Future; Expected date: 01/21/2022    Other dietary vitamin B12 deficiency anemia   -     B-12; Future; Expected date: 01/21/2022         Patient queried and all questions were answered.    F/u in 4 weeks w IV iron and blood work prior to next visit     Signed:  Clarissa Louis MD  Hematology and Oncology  [facility]

## 2022-01-24 NOTE — TELEPHONE ENCOUNTER
Spoke with patient's daughter. She stated that the patient not taking oral B12. Explained that Dr. Louis would like to add B12 supplementation daily. Patient' daughter verbalized understanding.

## 2022-01-24 NOTE — TELEPHONE ENCOUNTER
----- Message from Clarissa Louis MD sent at 1/24/2022 10:56 AM CST -----  Can you verify if patient is taking PO B12 and if they are how much? His B12 wnl but lower than we would like it. Maybe might be a good idea to get him IM injection if patient does not want any more pills

## 2022-01-25 NOTE — TELEPHONE ENCOUNTER
----- Message from Pao Blank sent at 1/25/2022  4:34 PM CST -----  Regarding: Call Back  Who Called:  Cecilia([facility])          What is the reason for the call: pt had two falls over the weekend. States he has multiple skin tears to arms, and would like to clean with normal saline and the apply mepilex border weekly/as needed until healed. Please contact         Can patient be contacted on [facility]: n/a         Call back number: [phone]

## 2022-01-28 NOTE — PLAN OF CARE
Tolerated Venofer well.  Observed pt 30 minutes post infusion, no reactions noted.  No questions or concerns at this time. Left unit via w/c in NAD.

## 2022-01-31 NOTE — TELEPHONE ENCOUNTER
HH nurse called , Pt is having RLQ pain over last few days that has been about the same since he received his iron infusion.pt has had several  BMs  appt scheduled with Mrs Mora

## 2022-01-31 NOTE — TELEPHONE ENCOUNTER
----- Message from Kaitlynn Gimenez MA sent at 1/31/2022  3:36 PM CST -----  Type: Needs Medical Advice  Who Called:  Cecilia DELAROSA   Best Call Back Number: [phone]  Additional Information: patient is having right lower quad pain. It is a dull pain but sharper on palpation. Patient has a bowel movement yesterday and today. Vitals are within range.

## 2022-02-01 NOTE — PROGRESS NOTES
Subjective:       Patient ID: Dom Guzman is a 89 y.o. male.    Chief Complaint: Abdominal Pain (RLQ)    Had iron infusion on Friday. Saturday morning started complaining of right lower abdomen pain. Severe pain on Saturday, says pain is almost gone now. Nothing else was abnormal. Patient believes it was the iron that caused the issue. He is a poor historian, granddaughter is here as well. She says he had some constipation a couple of days prior to this but BMs have been normal since Saturday afternoon. He has chronic complaint od urinary frequency, this has not changed. He did not like ditropan because he says it made him feel like he could not urinate. Appetite unchanged. No other significant symptoms that his grand daughter has noticed.    Past Medical History:  No date: Actinic keratosis  No date: Anticoagulant long-term use      Comment:  plavix  No date: B12 deficiency anemia  07/07/2014: Basal cell carcinoma      Comment:  right infra-auricular  No date: Blepharoconjunctivitis of both eyes  01/2017: Coronary artery disease      Comment:  1 stent 7/2016  No date: Defibrillator discharge  No date: Diabetes mellitus type II  No date: Ectropion of eyelid      Comment:  Lower lids  No date: HLD (hyperlipidemia)  No date: Insomnia  01/2017: Ischemic cardiomyopathy  No date: Macular degeneration  No date: Neuropathy due to secondary diabetes  No date: Posterior vitreous detachment of both eyes  No date: Squamous Cell Carcinoma    Past Surgical History:  No date: ALEXANDRE HOLE FOR SUBDURAL HEMATOMA  No date: CARDIAC DEFIBRILLATOR PLACEMENT  No date: CATARACT EXTRACTION      Comment:  OU  1995: CORNEAL TRANSPLANT; Left  07/2016: CORONARY ANGIOPLASTY WITH STENT PLACEMENT  No date: DSEK- OS  12/3/2021: ESOPHAGOGASTRODUODENOSCOPY; N/A      Comment:  Procedure: EGD (ESOPHAGOGASTRODUODENOSCOPY);  Surgeon:                Rex Shepherd MD;  Location: [facility];  Service:                Endoscopy;  Laterality: N/A;  No  date: EYE SURGERY  No date: HIP SURGERY      Comment:  total hip replacement  No date: JOINT REPLACEMENT    Review of patient's family history indicates:  Problem: Heart disease      Relation: Father          Age of Onset: (Not Specified)  Problem: Diabetes      Relation: Sister          Age of Onset: (Not Specified)  Problem: Blindness      Relation: Brother          Age of Onset: (Not Specified)  Problem: Diabetes      Relation: Brother          Age of Onset: (Not Specified)  Problem: Melanoma      Relation: Neg Hx          Age of Onset: (Not Specified)  Problem: Psoriasis      Relation: Neg Hx          Age of Onset: (Not Specified)  Problem: Lupus      Relation: Neg Hx          Age of Onset: (Not Specified)  Problem: Eczema      Relation: Neg Hx          Age of Onset: (Not Specified)  Problem: Amblyopia      Relation: Neg Hx          Age of Onset: (Not Specified)  Problem: Cancer      Relation: Neg Hx          Age of Onset: (Not Specified)  Problem: Cataracts      Relation: Neg Hx          Age of Onset: (Not Specified)  Problem: Glaucoma      Relation: Neg Hx          Age of Onset: (Not Specified)  Problem: Hypertension      Relation: Neg Hx          Age of Onset: (Not Specified)  Problem: Macular degeneration      Relation: Neg Hx          Age of Onset: (Not Specified)  Problem: Retinal detachment      Relation: Neg Hx          Age of Onset: (Not Specified)  Problem: Strabismus      Relation: Neg Hx          Age of Onset: (Not Specified)  Problem: Stroke      Relation: Neg Hx          Age of Onset: (Not Specified)  Problem: Thyroid disease      Relation: Neg Hx          Age of Onset: (Not Specified)      Social History    Socioeconomic History      Marital status:     Tobacco Use      Smoking status: Never Smoker      Smokeless tobacco: Never Used    Substance and Sexual Activity      Alcohol use: No      Drug use: No      Current Outpatient Medications:  azelastine (ASTELIN) 137 mcg (0.1 %) nasal spray,  1 SPRAY (137 MCG TOTAL) BY NASAL ROUTE 2 (TWO) TIMES DAILY., Disp: 30 mL, Rfl: 0  diclofenac sodium (VOLTAREN) 1 % Gel, Apply 2 g topically once daily., Disp: 100 g, Rfl: 2  ferrous sulfate (FEOSOL) Tab tablet, Take 1 tablet (1 each total) by mouth 2 (two) times daily., Disp: 60 tablet, Rfl: 0  fluticasone propionate (FLONASE) 50 mcg/actuation nasal spray, 1 SPRAY (50 MCG TOTAL) BY EACH NOSTRIL ROUTE ONCE DAILY., Disp: 16 g, Rfl: 0  ketoconazole (NIZORAL) 2 % shampoo, Wash hair and face with medicated shampoo at least 2x/week - let sit on scalp at least 5 minutes prior to rinsing., Disp: 120 mL, Rfl: 5  MINERAL OIL/PETROLATUM,WHITE (SYSTANE NIGHTTIME OPHT), Apply 1-2 drops to eye every evening., Disp: , Rfl:   pedi multivit no.11-folic acid 200 mcg Chew, Take 1 Dose by mouth Daily. 1 gummy PO a day, Disp: , Rfl:   PROPYLENE GLYCOL/ (SYSTANE ULTRA OPHT), Place 1 drop into both eyes 3 (three) times daily., Disp: , Rfl:   temazepam (RESTORIL) 30 mg capsule, Take 2 capsules (60 mg total) by mouth nightly as needed for Insomnia. TAKE 1 CAPSULE BY MOUTH NIGHTLY AS NEEDED., Disp: 60 capsule, Rfl: 5  meclizine (ANTIVERT) 25 mg tablet, Take 1 tablet (25 mg total) by mouth 3 (three) times daily as needed. (Patient not taking: No sig reported), Disp: 90 tablet, Rfl: 0    No current facility-administered medications for this visit.      Review of patient's allergies indicates:   -- Carvedilol -- Nausea Only   -- Codeine     --  Other reaction(s): Unknown    Review of Systems   Constitutional: Negative for chills, diaphoresis, fatigue and fever.   Respiratory: Negative for cough and shortness of breath.    Cardiovascular: Negative for chest pain.   Gastrointestinal: Positive for abdominal pain. Negative for diarrhea, nausea, rectal pain and vomiting.        Patient had not had a BM in a couple of days prior to the pain, had a couple of Bms after that, patient says that pain is a lot better today, not gone, but  significantly improved.    Genitourinary: Positive for frequency (ongoing) and urgency (ongoing). Negative for bladder incontinence and dysuria.        Was treated for this by pcp but refuses to take the medication.         Objective:      Physical Exam  Constitutional:       Appearance: Normal appearance.   Abdominal:      General: Bowel sounds are normal.      Palpations: Abdomen is soft.      Tenderness: There is abdominal tenderness in the suprapubic area.       Neurological:      Mental Status: He is alert.         Assessment:       Problem List Items Addressed This Visit    None     Visit Diagnoses     Suprapubic pain    -  Primary    Relevant Orders    Urinalysis, Reflex to Urine Culture Urine, Clean Catch (Completed)    X-Ray Abdomen Flat And Erect (Completed)    US Retroperitoneal Complete          Plan:       1. Suprapubic pain  UA does not show any abnormality. Will do KUB today to rule out constipation, he says pain is almost resolved, will do US if kub negative and pain continues. Follow up if worsneing immediately.   - Urinalysis, Reflex to Urine Culture Urine, Clean Catch  - X-Ray Abdomen Flat And Erect; Future  - US Retroperitoneal Complete; Future

## 2022-02-06 NOTE — TELEPHONE ENCOUNTER
----- Message from Abby Griffin NP sent at 2/4/2022  3:33 PM CST -----  He is not emptying his bladder fully, enlarged prostate gland, and a stone in the bladder, I can place a referral to urology to see what the options are to try to help him with the symptoms.   Also forwarding to PCP to see his recommendations.

## 2022-02-07 NOTE — TELEPHONE ENCOUNTER
Spoke with patient daughter, appointment scheduled for 2/18/2022 @1 pm. Patient daughter expressed understanding.

## 2022-02-15 NOTE — TELEPHONE ENCOUNTER
----- Message from Leena Delaney sent at 2/15/2022 11:34 AM CST -----  Type: Needs Medical Advice  Who Called:  Malissa pts daughter   Symptoms (please be specific):  She has some questions about his care and wanting to speak to a nurse    Best Call Back Number: [phone]  Additional Information: Please call and advise

## 2022-02-18 NOTE — PROGRESS NOTES
Subjective:       Patient ID: Dom Guzman is a 89 y.o. male.    Chief Complaint: Nephrolithiasis    HPI     89-year-old with urinary retention.  He initially underwent an ultrasound which showed a moderately distended bladder with a small 9 mm bladder stone.  He began having worsening groin pain and was seen in the emergency room.  A Gramajo catheter was placed and drained 175 cc of blood tinged urine.  The groin pain he was having is now resolved.  He is primarily complaining of discomfort from the Gramajo catheter and he wants it removed.  He was given Flomax which he is currently taking.    Past Medical History:   Diagnosis Date    Actinic keratosis     Anticoagulant long-term use     plavix    B12 deficiency anemia     Basal cell carcinoma 07/07/2014    right infra-auricular    Blepharoconjunctivitis of both eyes     Coronary artery disease 01/2017    1 stent 7/2016    Defibrillator discharge     Diabetes mellitus type II     Ectropion of eyelid     Lower lids    HLD (hyperlipidemia)     Insomnia     Ischemic cardiomyopathy 01/2017    Macular degeneration     Neuropathy due to secondary diabetes     Posterior vitreous detachment of both eyes     Squamous Cell Carcinoma      Past Surgical History:   Procedure Laterality Date    ALEXANDRE HOLE FOR SUBDURAL HEMATOMA      CARDIAC DEFIBRILLATOR PLACEMENT      CATARACT EXTRACTION      OU    CORNEAL TRANSPLANT Left 1995    CORONARY ANGIOPLASTY WITH STENT PLACEMENT  07/2016    DSEK- OS      ESOPHAGOGASTRODUODENOSCOPY N/A 12/3/2021    Procedure: EGD (ESOPHAGOGASTRODUODENOSCOPY);  Surgeon: Rex Shepherd MD;  Location: [facility];  Service: Endoscopy;  Laterality: N/A;    EYE SURGERY      HIP SURGERY      total hip replacement    JOINT REPLACEMENT         Current Outpatient Medications:     azelastine (ASTELIN) 137 mcg (0.1 %) nasal spray, 1 SPRAY (137 MCG TOTAL) BY NASAL ROUTE 2 (TWO) TIMES DAILY., Disp: 30 mL, Rfl: 0    cyanocobalamin, vitamin  B-12, 5,000 mcg Cap, Take 5,000 mcg by mouth once daily., Disp: , Rfl:     diclofenac sodium (VOLTAREN) 1 % Gel, Apply 2 g topically once daily., Disp: 100 g, Rfl: 2    ferrous sulfate (FEOSOL) Tab tablet, Take 1 tablet (1 each total) by mouth 2 (two) times daily., Disp: 60 tablet, Rfl: 0    fluticasone propionate (FLONASE) 50 mcg/actuation nasal spray, 1 SPRAY (50 MCG TOTAL) BY EACH NOSTRIL ROUTE ONCE DAILY., Disp: 16 g, Rfl: 0    ketoconazole (NIZORAL) 2 % shampoo, Wash hair and face with medicated shampoo at least 2x/week - let sit on scalp at least 5 minutes prior to rinsing., Disp: 120 mL, Rfl: 5    MINERAL OIL/PETROLATUM,WHITE (SYSTANE NIGHTTIME OPHT), Apply 1-2 drops to eye every evening. both eyes, Disp: , Rfl:     pedi multivit no.11-folic acid 200 mcg Chew, Take 1 Dose by mouth Daily. 1 gummy by mouth once a day, Disp: , Rfl:     PROPYLENE GLYCOL/ (SYSTANE ULTRA OPHT), Place 1 drop into both eyes 3 (three) times daily., Disp: , Rfl:     temazepam (RESTORIL) 30 mg capsule, Take 2 capsules (60 mg total) by mouth nightly as needed for Insomnia. TAKE 1 CAPSULE BY MOUTH NIGHTLY AS NEEDED. (Patient taking differently: Take 60 mg by mouth nightly as needed for Insomnia.), Disp: 60 capsule, Rfl: 5    meclizine (ANTIVERT) 25 mg tablet, Take 1 tablet (25 mg total) by mouth 3 (three) times daily as needed. (Patient not taking: No sig reported), Disp: 90 tablet, Rfl: 0    tamsulosin (FLOMAX) 0.4 mg Cap, Take 1 capsule (0.4 mg total) by mouth once daily. for 10 days, Disp: 90 capsule, Rfl: 3      Review of Systems   Unable to perform ROS: Age       Objective:      Physical Exam  Vitals reviewed.   Constitutional:       General: He is not in acute distress.     Appearance: He is well-developed and well-nourished.   Pulmonary:      Effort: Pulmonary effort is normal.   Genitourinary:     Comments: Gramajo in place, urine is clear  Musculoskeletal:      Right lower leg: No edema.      Left lower leg: No  edema.   Skin:     Findings: No rash.   Neurological:      Mental Status: He is alert and oriented to person, place, and time. Mental status is at baseline.   Psychiatric:         Mood and Affect: Mood and affect normal.         Assessment:       1. Enlarged prostate with urinary obstruction    2. Urinary retention    3. Bladder stone        Plan:       Enlarged prostate with urinary obstruction    Urinary retention  -     Gramajo catheter - discontinue    Bladder stone    Other orders  -     tamsulosin (FLOMAX) 0.4 mg Cap; Take 1 capsule (0.4 mg total) by mouth once daily. for 10 days  Dispense: 90 capsule; Refill: 3      continue Flomax.  Gramajo out for voiding trial.  Follow-up 2 weeks for PVR

## 2022-02-18 NOTE — PROGRESS NOTES
Health Maintenance Due   Topic Date Due    Shingles Vaccine (2 of 3) 07/16/2012    Diabetes Urine Screening  11/17/2021    Lipid Panel  11/17/2021     Updates were requested from care everywhere.  Chart was reviewed for overdue Proactive [facility] Encounters ([facility]) topics (CRS, Breast Cancer Screening, Eye exam)  Health Maintenance has been updated.  LINKS immunization registry triggered.  Immunizations were reconciled.

## 2022-02-18 NOTE — PROGRESS NOTES
Per Dr Juan Alberto moeller/tarun toney .  Withdrew 10   Cc from balloon and removed  16 Fr toney catheter intact. Provided voiding trial instruction. Pt expressed understanding and tolerated well.

## 2022-02-21 NOTE — TELEPHONE ENCOUNTER
----- Message from Robert Yarbrough sent at 2/21/2022 10:10 AM CST -----  Cecilia from [facility] is calling to discuss this pt for his upcoming appt today. She would like to talk to the nurse to advise about his bp and his issues. Please call Cecilia back at [phone] to update and advise please.

## 2022-02-21 NOTE — PROGRESS NOTES
Chief Complaint   Patient presents with    Paper Work    Fall     Saturday Morning     HPI:   Here for  f/u on chronic health issues and for some paperwork for his daughter who is caring for him  She needs [place] completed to care for him    Here with daughter who reports general decline  Only taking iron twice a day    taking flomax with dizziness.    Not taking appetite supplement. He declines this.  Weight stable    He was recently admitted for severe anemia  He was diagnosed with iron deficiency.  He is taking iron BID  Following with hematology  EGD was unremarkable    Getting  nursing with wound care  Still falling frequently    Diabetes - stopped metformin  QD-BID; Bgs 124 in AM. No hypoglycemia. Hgb A1c 6.0  Neuropathy has been stable.  He is using a topical analgesic 4 times a day which is helping.    HLD - stopped crestor 10mg daily  HTN - stable off benazepril  Insomnia - taking Restoril 30mg 2 nightly.  CAD s/p NSTEMI - underwent successful PCI of LAD; ICD  Taking oral B12      Past Medical History:   Diagnosis Date    Actinic keratosis     Anticoagulant long-term use     plavix    B12 deficiency anemia     Basal cell carcinoma 07/07/2014    right infra-auricular    Blepharoconjunctivitis of both eyes     Coronary artery disease 01/2017    1 stent 7/2016    Defibrillator discharge     Diabetes mellitus type II     Ectropion of eyelid     Lower lids    HLD (hyperlipidemia)     Insomnia     Ischemic cardiomyopathy 01/2017    Macular degeneration     Neuropathy due to secondary diabetes     Posterior vitreous detachment of both eyes     Squamous Cell Carcinoma            Past Surgical History:   Procedure Laterality Date    ALEXANDRE HOLE FOR SUBDURAL HEMATOMA      CARDIAC DEFIBRILLATOR PLACEMENT      CATARACT EXTRACTION      OU    CORNEAL TRANSPLANT Left 1995    CORONARY ANGIOPLASTY WITH STENT PLACEMENT  07/2016    DSEK- OS      ESOPHAGOGASTRODUODENOSCOPY N/A 12/3/2021     "Procedure: EGD (ESOPHAGOGASTRODUODENOSCOPY);  Surgeon: Rex Shepherd MD;  Location: [facility];  Service: Endoscopy;  Laterality: N/A;    EYE SURGERY      HIP SURGERY      total hip replacement    JOINT REPLACEMENT           SOCIAL HISTORY: No tobacco. No alcohol. Lives with family.     FAMILY HISTORY: No prostate or colon cancer.     REVIEW OF SYSTEMS:     GENERAL: No fever, chills, fatigability  EYES: Visual acuity fine. Denies blurriness, tearing, itching, photophobia, diplopia, or visual changes.   EARS: Denies ear pain, discharge, tinnitus or vertigo. Denies hearing loss.   NOSE: No loss of smell, epistaxis, postnasal drip, discharge, obstruction, or sneezing.  MOUTH & THROAT: No hoarseness, change in voice, swallowing difficulty. No excessive gum bleeding.   HEMATOLOGICAL/NODES: Denies swollen glands. No bleeding or bruising.  CHEST: No ROBERTS, cyanosis, wheezing, cough or sputum production.  CARDIOVASCULAR: Denies chest pain, dyspnea, orthopnea, or palpitations.  GI/ABDOMEN: Appetite fine. No weight loss. Denies nausea, vomiting, diarrhea, constipation, abdominal pain, hematemesis or blood in stool.  URINARY: No dysuria,hematuria, nocturia, incontinence, flank pain, urgency, or urinary difficulty.  PERIPHERAL VASCULAR: No claudication, cold intolerance or cyanosis.  Reports numbness to feet and pain, which has been chronic. No worsening, no major improvement since starting Neurontin.  NEUROLOGIC: No history of seizures, paralysis, alteration of gait or coordination.    PSYCH:  No crying spells. Denies anxiety symptoms.    PHYSICAL EXAM:     BP (!) 100/40   Pulse 84   Resp 18   Ht 5' 10" (1.778 m)   Wt 61.8 kg (136 lb 3.9 oz)   SpO2 96%   BMI 19.55 kg/m²     GENERAL: He is a well-developed, well-nourished male, who appears in no apparent distress.   HEENT: NC/AT. PERRLA. EOMI. Mucous membranes are moist.   NECK: Supple. No carotid bruits. No thyroid enlargement.   CHEST: Clear to auscultation " bilaterally with no crackles, rhonchi or wheezes.   CARDIOVASCULAR: Reveals a regular rate and rhythm. Normal S1 and S2. No murmurs, rubs or gallops are noted.   ABDOMEN: Positive bowel sounds, soft, and nontender. No HSM. No masses.   EXTREMITIES: No edema noted and pulses 2+ bilaterally.   Right knee with joint hypertrophy and reduced extension  Foot exam: inspection normal, decreased sensation in bilateral feett; 2+ DP pulses bilaterally    Results for orders placed or performed during the hospital encounter of 02/08/22   CBC Auto Differential   Result Value Ref Range    WBC 12.62 3.90 - 12.70 K/uL    RBC 3.11 (L) 4.60 - 6.20 M/uL    Hemoglobin 7.4 (L) 14.0 - 18.0 g/dL    Hematocrit 25.4 (L) 40.0 - 54.0 %    MCV 82 82 - 98 fL    MCH 23.8 (L) 27.0 - 31.0 pg    MCHC 29.1 (L) 32.0 - 36.0 g/dL    RDW 17.9 (H) 11.5 - 14.5 %    Platelets 431 150 - 450 K/uL    MPV 8.6 (L) 9.2 - 12.9 fL    Immature Granulocytes 0.6 (H) 0.0 - 0.5 %    Gran # (ANC) 9.6 (H) 1.8 - 7.7 K/uL    Immature Grans (Abs) 0.07 (H) 0.00 - 0.04 K/uL    Lymph # 1.8 1.0 - 4.8 K/uL    Mono # 1.0 0.3 - 1.0 K/uL    Eos # 0.1 0.0 - 0.5 K/uL    Baso # 0.04 0.00 - 0.20 K/uL    nRBC 0 0 /100 WBC    Gran % 75.7 (H) 38.0 - 73.0 %    Lymph % 14.6 (L) 18.0 - 48.0 %    Mono % 8.2 4.0 - 15.0 %    Eosinophil % 0.6 0.0 - 8.0 %    Basophil % 0.3 0.0 - 1.9 %    Differential Method Automated    Comprehensive Metabolic Panel   Result Value Ref Range    Sodium 137 136 - 145 mmol/L    Potassium 4.2 3.5 - 5.1 mmol/L    Chloride 109 95 - 110 mmol/L    CO2 21 (L) 22 - 31 mmol/L    Glucose 145 (H) 70 - 110 mg/dL    BUN 17 9 - 21 mg/dL    Creatinine 1.10 0.50 - 1.40 mg/dL    Calcium 8.1 (L) 8.4 - 10.2 mg/dL    Total Protein 5.9 (L) 6.0 - 8.4 g/dL    Albumin 2.7 (L) 3.5 - 5.2 g/dL    Total Bilirubin 0.2 0.2 - 1.3 mg/dL    Alkaline Phosphatase 118 38 - 145 U/L    AST 23 17 - 59 U/L    ALT 17 0 - 50 U/L    Anion Gap 7 (L) 8 - 16 mmol/L    eGFR if African American >60 >60 mL/min/1.73  m^2    eGFR if non African American 58 (A) >60 mL/min/1.73 m^2   Urinalysis, Reflex to Urine Culture Urine, Clean Catch    Specimen: Urine   Result Value Ref Range    Specimen UA Urine, Clean Catch     Color, UA Yellow Yellow, Straw, Ct    Appearance, UA Clear Clear    pH, UA 5.0 5.0 - 8.0    Specific Gravity, UA 1.015 1.005 - 1.030    Protein, UA Negative Negative    Glucose, UA Negative Negative    Ketones, UA Negative Negative    Bilirubin (UA) Negative Negative    Occult Blood UA Negative Negative    Nitrite, UA Negative Negative    Urobilinogen, UA 0.2 <2.0 EU/dL    Leukocytes, UA Negative Negative         A/P:  Weight loss    Overactive bladder  -     tamsulosin (FLOMAX) 0.4 mg Cap; Take 1 capsule (0.4 mg total) by mouth once daily. for 10 days  Dispense: 30 capsule; Refill: 3    Other emphysema    Chronic diastolic heart failure    Decreased appetite    Type 2 diabetes mellitus without complication, without long-term current use of insulin    Administrative encounter    Other orders  -     HYDROcodone-acetaminophen (NORCO) 5-325 mg per tablet; Take 1 tablet by mouth every 12 (twelve) hours as needed for Pain.  Dispense: 30 tablet; Refill: 0       Paperwork for daughter for leave of absence from work  no further diabetic medications needed due to weight loss  Gradually declining. Continue supportive care and HH  F/u with hematology  Continue iron BID  Continue Restoril 60mg daily (risks and benefits of off label use discussed with patient and family and risk of death accepted)  Counseled on regular exercise, maintenance of a healthy weight, balanced diet rich in fruits/vegetables and lean protein, and avoidance of unhealthy habits like smoking and excessive alcohol intake.  f/u 6 months      1 hour spent in visit

## 2022-02-22 NOTE — TELEPHONE ENCOUNTER
Didn't doctor gave flomax for blood pressure and blood was 88/46 home health want to know can he get on something else

## 2022-02-22 NOTE — TELEPHONE ENCOUNTER
----- Message from Ronald Benton sent at 2/21/2022  3:24 PM CST -----  Type:  Patient Returning Call    Who Called:  Cecilia/ [place]=Home Health  Who Left Message for Patient: Jennifer  Does the patient know what this is regarding?:    Best Call Back Number: [phone]  Additional Information:

## 2022-02-22 NOTE — TELEPHONE ENCOUNTER
Spoke with [facility] home health nurse and notified of Dr Tobar instructions, Cecilia verbalized understanding

## 2022-02-25 PROBLEM — J43.8 OTHER EMPHYSEMA: Status: ACTIVE | Noted: 2022-01-01

## 2022-02-28 NOTE — TELEPHONE ENCOUNTER
Left message to call the office to schedule/reschedule appt. Recall number provided.  ----- Message from Geno Dillard RN sent at 2/28/2022 12:27 PM CST -----    ----- Message -----  From: Phyllis Way  Sent: 2/28/2022  12:14 PM CST  To: [facility] Staff, #    Type:Needs Medical Advice    Who Called:Malissa (Daughter)  Best call back number:[phone]  Additional Info: Requesting a call back regarding#PT needs to cancel appt that is scheduled for Wednesday. Will call to reschedule.  Please Advise- Thank you

## 2022-02-28 NOTE — TELEPHONE ENCOUNTER
----- Message from Phyllis Way sent at 2/28/2022 12:12 PM CST -----  Type:Needs Medical Advice    Who Called:Malissa (Daughter)  Best call back number:[phone]  Additional Info: Requesting a call back regarding#PT needs to cancel appt that is scheduled for Wednesday. Will call to reschedule.  Please Advise- Thank you

## 2022-03-22 NOTE — TELEPHONE ENCOUNTER
[facility] nurse reports Patient is declining, is developing more dependant edema in extremities with weeping, but is also evident in hip areas as well. Family ready to discuss hospice option. Referral pended for review.

## 2022-03-22 NOTE — TELEPHONE ENCOUNTER
Provider approved referral to Hospice. Call placed to office, advised that order is available.     They will call us back to affirm admission.

## 2022-03-22 NOTE — TELEPHONE ENCOUNTER
----- Message from Maricarmen Ocoha sent at 3/22/2022 10:57 AM CDT -----  Contact: Cecilia gleason/[place]   Cecilia is requesting you send over a referral to [facility] call back at [phone] and thanks

## 2022-04-01 NOTE — TELEPHONE ENCOUNTER
----- Message from Vicki Devries LPN sent at 4/1/2022  2:04 PM CDT -----  Contact: [facility]    ----- Message -----  From: Tyler Panchal  Sent: 4/1/2022  12:40 PM CDT  To: Joanne SAUCEDO Staff    Type: Needs Medical Advice  Who Called:  [facility]  Symptoms (please be specific):  n/a  How long has patient had these symptoms:  n/a  Pharmacy name and phone #:  n/a  Best Call Back Number: [phone]  Additional Information: [facility] called in and needs to get patients AICD inactivated.

## 2022-11-27 NOTE — TELEPHONE ENCOUNTER
Patient has flu like symptoms.  Wife is treating at home with tylenol and gatorade and OJ. States biopsy site well healed and nothing noted.  Will call next week after gets patient feeling better and schedule a nurse visit to see if needs to keep r/s March 2 surgery appt.   170